# Patient Record
Sex: FEMALE | Race: WHITE | NOT HISPANIC OR LATINO | URBAN - METROPOLITAN AREA
[De-identification: names, ages, dates, MRNs, and addresses within clinical notes are randomized per-mention and may not be internally consistent; named-entity substitution may affect disease eponyms.]

---

## 2017-02-02 ENCOUNTER — APPOINTMENT (RX ONLY)
Dept: URBAN - METROPOLITAN AREA TELEMEDICINE 2 | Facility: TELEMEDICINE | Age: 62
Setting detail: DERMATOLOGY
End: 2017-02-02

## 2017-02-02 DIAGNOSIS — L82.1 OTHER SEBORRHEIC KERATOSIS: ICD-10-CM

## 2017-02-02 DIAGNOSIS — Z41.9 ENCOUNTER FOR PROCEDURE FOR PURPOSES OTHER THAN REMEDYING HEALTH STATE, UNSPECIFIED: ICD-10-CM

## 2017-02-02 DIAGNOSIS — L92.3 FOREIGN BODY GRANULOMA OF THE SKIN AND SUBCUTANEOUS TISSUE: ICD-10-CM

## 2017-02-02 DIAGNOSIS — Z71.89 OTHER SPECIFIED COUNSELING: ICD-10-CM

## 2017-02-02 DIAGNOSIS — L82.0 INFLAMED SEBORRHEIC KERATOSIS: ICD-10-CM

## 2017-02-02 PROBLEM — D48.5 NEOPLASM OF UNCERTAIN BEHAVIOR OF SKIN: Status: ACTIVE | Noted: 2017-02-02

## 2017-02-02 PROBLEM — K75.9 INFLAMMATORY LIVER DISEASE, UNSPECIFIED: Status: ACTIVE | Noted: 2017-02-02

## 2017-02-02 PROBLEM — L55.1 SUNBURN OF SECOND DEGREE: Status: ACTIVE | Noted: 2017-02-02

## 2017-02-02 PROBLEM — L23.7 ALLERGIC CONTACT DERMATITIS DUE TO PLANTS, EXCEPT FOOD: Status: ACTIVE | Noted: 2017-02-02

## 2017-02-02 PROCEDURE — ? TREATMENT REGIMEN

## 2017-02-02 PROCEDURE — 99202 OFFICE O/P NEW SF 15 MIN: CPT | Mod: 25

## 2017-02-02 PROCEDURE — ? COSMETIC CONSULTATION: FILLERS

## 2017-02-02 PROCEDURE — ? BIOPSY BY SHAVE METHOD

## 2017-02-02 PROCEDURE — ? FOREIGN BODY REMOVAL

## 2017-02-02 PROCEDURE — ? COUNSELING

## 2017-02-02 PROCEDURE — ? MEDICAL CONSULTATION: DYNAMIC RHYTIDES

## 2017-02-02 PROCEDURE — ? PATIENT SPECIFIC COUNSELING

## 2017-02-02 PROCEDURE — 11100: CPT

## 2017-02-02 PROCEDURE — 10120 INC&RMVL FB SUBQ TISS SMPL: CPT

## 2017-02-02 ASSESSMENT — LOCATION SIMPLE DESCRIPTION DERM
LOCATION SIMPLE: LEFT LIP
LOCATION SIMPLE: LEFT UPPER BACK
LOCATION SIMPLE: RIGHT CHEEK
LOCATION SIMPLE: LEFT HAND
LOCATION SIMPLE: RIGHT UPPER BACK

## 2017-02-02 ASSESSMENT — LOCATION DETAILED DESCRIPTION DERM
LOCATION DETAILED: LEFT LOWER CUTANEOUS LIP
LOCATION DETAILED: LEFT HYPOTHENAR EMINENCE
LOCATION DETAILED: RIGHT MID-UPPER BACK
LOCATION DETAILED: RIGHT CENTRAL BUCCAL CHEEK
LOCATION DETAILED: LEFT ULNAR PALM
LOCATION DETAILED: LEFT THENAR EMINENCE
LOCATION DETAILED: RIGHT SUPERIOR UPPER BACK
LOCATION DETAILED: LEFT INFERIOR UPPER BACK

## 2017-02-02 ASSESSMENT — LOCATION ZONE DERM
LOCATION ZONE: HAND
LOCATION ZONE: FACE
LOCATION ZONE: TRUNK
LOCATION ZONE: LIP

## 2017-02-02 NOTE — PROCEDURE: BIOPSY BY SHAVE METHOD
Cryotherapy Text: The wound bed was treated with cryotherapy after the biopsy was performed.
Hemostasis: Drysol
Size Of Lesion In Cm: 0.8
Curettage Text: The wound bed was treated with curettage after the biopsy was performed.
Electrodesiccation And Curettage Text: The wound bed was treated with electrodesiccation and curettage after the biopsy was performed.
Body Location Override (Optional - Billing Will Still Be Based On Selected Body Map Location If Applicable): Left lateral back
Wound Care: Vaseline
Anesthesia Volume In Cc: 0.5
Lab Facility: [id number]
X Size Of Lesion In Cm: 0
Bill For Surgical Tray: no
Electrodesiccation Text: The wound bed was treated with electrodesiccation after the biopsy was performed.
Post-Care Instructions: I reviewed with the patient in detail post-care instructions. Patient is to keep the biopsy site dry overnight, and then apply bacitracin twice daily until healed. Patient may apply hydrogen peroxide soaks to remove any crusting.
Dressing: bandage
Consent: Written consent was obtained and risks were reviewed including but not limited to scarring, infection, bleeding, scabbing, incomplete removal, nerve damage and allergy to anesthesia.
Render Post-Care Instructions In Note?: yes
Detail Level: Detailed
Silver Nitrate Text: The wound bed was treated with silver nitrate after the biopsy was performed.
Anesthesia Type: 1% lidocaine with epinephrine
Biopsy Method: Dermablade
Notification Instructions: Patient will be notified of biopsy results. However, patient instructed to call the office if not contacted within 2 weeks.
Billing Type: Third-Party Bill
Biopsy Type: H and E
Lab: [id number]
Type Of Destruction Used: Curettage

## 2017-02-02 NOTE — PROCEDURE: MIPS QUALITY
Quality 431: Preventive Care And Screening: Unhealthy Alcohol Use - Screening: Patient screened for unhealthy alcohol use using a single question and scores less than 2 times per year
Detail Level: Detailed
Quality 110: Preventive Care And Screening: Influenza Immunization: Influenza Immunization not Administered for Documented Reasons.

## 2017-02-02 NOTE — PROCEDURE: FOREIGN BODY REMOVAL
Detail Level: Detailed
Bill For Simple Or Complicated Fb Removal?: simple
Anesthesia Volume In Cc: 0.5

## 2017-07-24 ENCOUNTER — HOSPITAL ENCOUNTER (OUTPATIENT)
Dept: MRI IMAGING | Facility: CLINIC | Age: 62
Discharge: HOME | End: 2017-07-24
Attending: PHYSICIAN ASSISTANT | Admitting: PHYSICIAN ASSISTANT
Payer: COMMERCIAL

## 2017-07-24 DIAGNOSIS — M25.519 SHOULDER PAIN: ICD-10-CM

## 2017-07-24 PROCEDURE — 73221 MRI JOINT UPR EXTREM W/O DYE: CPT | Mod: RT

## 2018-07-20 ENCOUNTER — OFFICE VISIT (OUTPATIENT)
Dept: FAMILY MEDICINE | Facility: CLINIC | Age: 63
End: 2018-07-20
Payer: COMMERCIAL

## 2018-07-20 ENCOUNTER — ANESTHESIA EVENT (OUTPATIENT)
Dept: SURGERY | Facility: CLINIC | Age: 63
DRG: 470 | End: 2018-07-20
Payer: COMMERCIAL

## 2018-07-20 VITALS
DIASTOLIC BLOOD PRESSURE: 82 MMHG | TEMPERATURE: 97.7 F | OXYGEN SATURATION: 96 % | WEIGHT: 132 LBS | SYSTOLIC BLOOD PRESSURE: 132 MMHG | RESPIRATION RATE: 12 BRPM | BODY MASS INDEX: 24.92 KG/M2 | HEART RATE: 55 BPM | HEIGHT: 61 IN

## 2018-07-20 DIAGNOSIS — M17.11 PRIMARY OSTEOARTHRITIS OF RIGHT KNEE: ICD-10-CM

## 2018-07-20 DIAGNOSIS — Z01.818 PREOP GENERAL PHYSICAL EXAM: Primary | ICD-10-CM

## 2018-07-20 DIAGNOSIS — R35.0 URINARY FREQUENCY: ICD-10-CM

## 2018-07-20 LAB
ALBUMIN UR-MCNC: NEGATIVE MG/DL
APPEARANCE UR: CLEAR
BILIRUB UR QL STRIP: NEGATIVE
COLOR UR AUTO: YELLOW
ERYTHROCYTE [DISTWIDTH] IN BLOOD BY AUTOMATED COUNT: 13.8 % (ref 10–15)
GLUCOSE UR STRIP-MCNC: NEGATIVE MG/DL
HCT VFR BLD AUTO: 40.7 % (ref 35–47)
HGB BLD-MCNC: 13.5 G/DL (ref 11.7–15.7)
HGB UR QL STRIP: NEGATIVE
KETONES UR STRIP-MCNC: NEGATIVE MG/DL
LEUKOCYTE ESTERASE UR QL STRIP: NEGATIVE
MCH RBC QN AUTO: 30.7 PG (ref 26.5–33)
MCHC RBC AUTO-ENTMCNC: 33.2 G/DL (ref 31.5–36.5)
MCV RBC AUTO: 93 FL (ref 78–100)
NITRATE UR QL: NEGATIVE
PH UR STRIP: 7 PH (ref 5–7)
PLATELET # BLD AUTO: 323 10E9/L (ref 150–450)
RBC # BLD AUTO: 4.4 10E12/L (ref 3.8–5.2)
RBC #/AREA URNS AUTO: NORMAL /HPF
SOURCE: NORMAL
SP GR UR STRIP: <=1.005 (ref 1–1.03)
UROBILINOGEN UR STRIP-ACNC: 0.2 EU/DL (ref 0.2–1)
WBC # BLD AUTO: 9.1 10E9/L (ref 4–11)
WBC #/AREA URNS AUTO: NORMAL /HPF

## 2018-07-20 PROCEDURE — 85027 COMPLETE CBC AUTOMATED: CPT | Performed by: NURSE PRACTITIONER

## 2018-07-20 PROCEDURE — 36415 COLL VENOUS BLD VENIPUNCTURE: CPT | Performed by: NURSE PRACTITIONER

## 2018-07-20 PROCEDURE — 99214 OFFICE O/P EST MOD 30 MIN: CPT | Performed by: NURSE PRACTITIONER

## 2018-07-20 PROCEDURE — 81001 URINALYSIS AUTO W/SCOPE: CPT | Performed by: NURSE PRACTITIONER

## 2018-07-20 RX ORDER — NICOTINE POLACRILEX 2 MG
1 GUM BUCCAL DAILY
COMMUNITY
End: 2024-08-05

## 2018-07-20 NOTE — MR AVS SNAPSHOT
After Visit Summary   7/20/2018    Preeti Sherman    MRN: [id number]           Patient Information     Date Of Birth          1955        Visit Information        Provider Department      7/20/2018 12:40 PM Chio Olivares NP [facility]        Today's Diagnoses     Preop general physical exam    -  1    Primary osteoarthritis of right knee        Urinary frequency          Care Instructions      Before Your Surgery      Call your surgeon if there is any change in your health. This includes signs of a cold or flu (such as a sore throat, runny nose, cough, rash or fever).    Do not smoke, drink alcohol or take over the counter medicine (unless your surgeon or primary care doctor tells you to) for the 24 hours before and after surgery.    If you take prescribed drugs: Follow your doctor s orders about which medicines to take and which to stop until after surgery.    Eating and drinking prior to surgery: follow the instructions from your surgeon    Take a shower or bath the night before surgery. Use the soap your surgeon gave you to gently clean your skin. If you do not have soap from your surgeon, use your regular soap. Do not shave or scrub the surgery site.  Wear clean pajamas and have clean sheets on your bed.           Follow-ups after your visit        Your next 10 appointments already scheduled     Jul 23, 2018   Procedure with Ugo Brady MD   [facility] (--)    [address] [address] [phone]           The medical center is located at [address] (between [address] in [place], [place] north of [place]).              Who to contact     If you have questions or need follow up information about today's clinic visit or your schedule please contact [facility] directly at [phone].  Normal or non-critical lab and imaging results will be communicated to you by MyChart, letter or  "phone within 4 business days after the clinic has received the results. If you do not hear from us within 7 days, please contact the clinic through [employer] or phone. If you have a critical or abnormal lab result, we will notify you by phone as soon as possible.  Submit refill requests through [employer] or call your pharmacy and they will forward the refill request to us. Please allow 3 business days for your refill to be completed.          Additional Information About Your Visit        [employer] Information     [employer] gives you secure access to your electronic health record. If you see a primary care provider, you can also send messages to your care team and make appointments. If you have questions, please call your primary care clinic.  If you do not have a primary care provider, please call [phone] and they will assist you.        Care EveryWhere ID     This is your Care EveryWhere ID. This could be used by other organizations to access your [place] medical records  [id number]        Your Vitals Were     Pulse Temperature Respirations Height Pulse Oximetry BMI (Body Mass Index)    55 97.7  F (36.5  C) (Tympanic) 12 5' 0.5\" (1.537 m) 96% 25.36 kg/m2       Blood Pressure from Last 3 Encounters:   07/20/18 132/82   07/17/15 120/81   07/15/15 100/65    Weight from Last 3 Encounters:   07/20/18 132 lb (59.9 kg)   07/15/15 128 lb (58.1 kg)   07/07/15 128 lb 3.2 oz (58.2 kg)              We Performed the Following     CBC with platelets     UA with Microscopic reflex to Culture        Primary Care Provider Office Phone # Fax #    Astrid Marin PA-C [phone] [address]  [facility] [address]        Equal Access to Services     [facility]: Hadii ravi Carlson, waaxda luqadaha, qaybta kaalmada adeegyada, noel hung. So [facility] [phone].    ATENCIÓN: Si habla español, tiene a wan disposición servicios gratuitos de asistencia lingüística. Llame " al [phone].    We comply with applicable federal civil rights laws and [place] laws. We do not discriminate on the basis of race, color, national origin, age, disability, sex, sexual orientation, or gender identity.            Thank you!     Thank you for choosing [facility]  for your care. Our goal is always to provide you with excellent care. Hearing back from our patients is one way we can continue to improve our services. Please take a few minutes to complete the written survey that you may receive in the mail after your visit with us. Thank you!             Your Updated Medication List - Protect others around you: Learn how to safely use, store and throw away your medicines at www.disposemymeds.org.          This list is accurate as of 7/20/18  2:26 PM.  Always use your most recent med list.                   Brand Name Dispense Instructions for use Diagnosis    Biotin 1 MG Caps           Calcium 250 MG Caps           citalopram 20 MG tablet    celeXA    30 tablet    Take 1/2 tablet (10 mg) for 1-2 weeks, then increase to 1 tablet orally daily    Major depressive disorder, recurrent episode, in partial remission (H)       TURMERIC PO

## 2018-07-20 NOTE — PROGRESS NOTES
[facility]  [address] [address]  [phone]  Dept: [phone]    PRE-OP EVALUATION:  Today's date: 2018    Preeti Sherman (: 1955) presents for pre-operative evaluation assessment as requested by Dr. Brady.  She requires evaluation and anesthesia risk assessment prior to undergoing surgery/procedure for treatment of right knee pain .    Proposed Surgery/ Procedure: Right Total Knee Arthroplasty  Date of Surgery/ Procedure: 18  Time of Surgery/ Procedure: 12:00pm  Hospital/Surgical Facility: [facility]    Primary Physician: Astrid Marin  Type of Anesthesia Anticipated: Spinal    Patient has a Health Care Directive or Living Will:  NO    1. NO - Do you have a history of heart attack, stroke, stent, bypass or surgery on an artery in the head, neck, heart or legs?  2. NO - Do you ever have any pain or discomfort in your chest?  3. NO - Do you have a history of  Heart Failure?  4. NO - Are you troubled by shortness of breath when: walking on the level, up a slight hill or at night?  5. NO - Do you currently have a cold, bronchitis or other respiratory infection?  6. NO - Do you have a cough, shortness of breath or wheezing?  7. NO - Do you sometimes get pains in the calves of your legs when you walk?  8. NO - Do you or anyone in your family have previous history of blood clots?  9. NO - Do you or does anyone in your family have a serious bleeding problem such as prolonged bleeding following surgeries or cuts?  10. YES, over 40 years ago. - Have you ever had problems with anemia or been told to take iron pills?  11. NO - Have you had any abnormal blood loss such as black, tarry or bloody stools, or abnormal vaginal bleeding?  12. NO - Have you ever had a blood transfusion?  13. YES, She vomited after rotator cuff surgery. - Have you or any of your relatives ever had problems with anesthesia?  14. NO - Do you have sleep apnea, excessive  snoring or daytime drowsiness?  15. NO - Do you have any prosthetic heart valves?  16. NO - Do you have prosthetic joints?  17. NO - Is there any chance that you may be pregnant?      HPI:     HPI related to upcoming procedure: Tore ACL 6 years ago and symptoms worsened over the last 6 years.  Now right knee is bone on bone.  Patient admits to crepitus in the knee.  Occasionally going up and down steps cause pain.  She did have some swelling in it this winter but that has resolved.      See problem list for active medical problems.  Problems all longstanding and stable, except as noted/documented.  See ROS for pertinent symptoms related to these conditions.                                                                                                                                                          .    MEDICAL HISTORY:     Patient Active Problem List    Diagnosis Date Noted     History of hepatitis B 07/20/2015     Priority: Medium     Hyperlipidemia LDL goal <130 03/19/2012     Priority: Medium     Major depression in partial remission (H) 06/30/2011     Priority: Medium      quadraplegic from accident now for over 10 years. Does well with lexapro.       CARDIOVASCULAR SCREENING; LDL GOAL LESS THAN 160 06/30/2011     Priority: Medium     Disorder of bursae and tendons in shoulder region 07/03/2007     Priority: Medium     Problem list name updated by automated process. 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     HYSTERECTOMY  1988     shoulder  2007    Left rotator cuff repair.     URETHROPLASTY       Current Outpatient Prescriptions   Medication Sig Dispense Refill     Biotin 1 MG CAPS        Calcium 250 MG CAPS        citalopram (CELEXA) 20 MG tablet  "Take 1/2 tablet (10 mg) for 1-2 weeks, then increase to 1 tablet orally daily 30 tablet 1     TURMERIC PO        OTC products: None, except as noted above    Allergies   Allergen Reactions     Penicillins Itching     Patient notes she can take keflex  Astrid Pankratz, SHANE      Sulfa Drugs Itching      Latex Allergy: NO    Social History   Substance Use Topics     Smoking status: Never Smoker     Smokeless tobacco: Never Used     Alcohol use Yes      Comment: Socially; 0-1 drinks per week     History   Drug Use No       REVIEW OF SYSTEMS:   CONSTITUTIONAL: NEGATIVE for fever, chills, change in weight  INTEGUMENTARY/SKIN: NEGATIVE for worrisome rashes, moles or lesions  EYES: NEGATIVE for vision changes or irritation  ENT/MOUTH: NEGATIVE for ear, mouth and throat problems  RESP: NEGATIVE for significant cough or SOB  BREAST: NEGATIVE for masses, tenderness or discharge  CV: NEGATIVE for chest pain, palpitations or peripheral edema  GI: NEGATIVE for nausea, abdominal pain, heartburn, or change in bowel habits   female: frequency that just started, concerned for UTI, Hx of UTIs  MUSCULOSKELETAL:right knee crepitus and occasional pain, left shoulder arthritis  NEURO: NEGATIVE for weakness, dizziness or paresthesias  ENDOCRINE: NEGATIVE for temperature intolerance, skin/hair changes  HEME: NEGATIVE for bleeding problems  PSYCHIATRIC: NEGATIVE for changes in mood or affect    EXAM:   /82  Pulse 55  Temp 97.7  F (36.5  C) (Tympanic)  Resp 12  Ht 5' 0.5\" (1.537 m)  Wt 132 lb (59.9 kg)  SpO2 96%  BMI 25.36 kg/m2    GENERAL APPEARANCE: healthy, alert and no distress     EYES: EOMI, PERRL     HENT: ear canals and TM's normal and nose and mouth without ulcers or lesions     NECK: no adenopathy, no asymmetry, masses, or scars and thyroid normal to palpation     RESP: lungs clear to auscultation - no rales, rhonchi or wheezes     CV: regular rates and rhythm, normal S1 S2, no S3 or S4 and no murmur, click or rub   "   ABDOMEN:  soft, nontender, no HSM or masses and bowel sounds normal     MS: normal muscle tone and right knee edema with some tenderness medially     SKIN: no suspicious lesions or rashes     NEURO: Normal strength and tone, sensory exam grossly normal, mentation intact and speech normal     PSYCH: mentation appears normal. and affect normal/bright     LYMPHATICS: No cervical adenopathy    DIAGNOSTICS:   EKG: Not indicated due to non-vascular surgery and low risk of event (age <65 and without cardiac risk factors)    Urinalysis completed due to increased frequency recently in urination with history of UTI and this was negative.     CBC completed and negative.      Color Urine (no units)   Date Value   07/20/2018 Yellow     Appearance Urine (no units)   Date Value   07/20/2018 Clear     Glucose Urine (mg/dL)   Date Value   07/20/2018 Negative     Bilirubin Urine (no units)   Date Value   07/20/2018 Negative     Ketones Urine (mg/dL)   Date Value   07/20/2018 Negative     Specific Gravity Urine (no units)   Date Value   07/20/2018 <=1.005     pH Urine (pH)   Date Value   07/20/2018 7.0     Protein Albumin Urine (mg/dL)   Date Value   07/20/2018 Negative     Urobilinogen Urine (EU/dL)   Date Value   07/20/2018 0.2     Nitrite Urine (no units)   Date Value   07/20/2018 Negative     Leukocyte Esterase Urine (no units)   Date Value   07/20/2018 Negative           Recent Labs   Lab Test  07/15/15   1532  03/07/12   1136   HGB  14.2  14.1   PLT  311  311   NA  138  139   POTASSIUM  3.9  4.1   CR  0.98  0.74     Lab Results   Component Value Date    WBC 9.1 07/20/2018     Lab Results   Component Value Date    RBC 4.40 07/20/2018     Lab Results   Component Value Date    HGB 13.5 07/20/2018     Lab Results   Component Value Date    HCT 40.7 07/20/2018     No components found for: MCT  Lab Results   Component Value Date    MCV 93 07/20/2018     Lab Results   Component Value Date    MCH 30.7 07/20/2018     Lab Results    Component Value Date    MCHC 33.2 07/20/2018     Lab Results   Component Value Date    RDW 13.8 07/20/2018     Lab Results   Component Value Date     07/20/2018          IMPRESSION:   Reason for surgery/procedure: Right knee is bone on bone  Diagnosis/reason for consult: Pre-operative physical exam.    The proposed surgical procedure is considered INTERMEDIATE risk.    REVISED CARDIAC RISK INDEX  The patient has the following serious cardiovascular risks for perioperative complications such as (MI, PE, VFib and 3  AV Block):  No serious cardiac risks  INTERPRETATION: 0 risks: Class I (very low risk - 0.4% complication rate)    The patient has the following additional risks for perioperative complications:  No identified additional risks      ICD-10-CM    1. Preop general physical exam Z01.818        RECOMMENDATIONS:       --Patient is to take all scheduled medications on the day of surgery EXCEPT for modifications listed below.    APPROVAL GIVEN to proceed with proposed procedure, without further diagnostic evaluation       Signed Electronically by: Chio Olivares NP    Copy of this evaluation report is provided to requesting physician.    [facility] Preop Guidelines    Revised Cardiac Risk Index

## 2018-07-23 ENCOUNTER — ANESTHESIA (OUTPATIENT)
Dept: SURGERY | Facility: CLINIC | Age: 63
DRG: 470 | End: 2018-07-23
Payer: COMMERCIAL

## 2018-07-23 ENCOUNTER — HOSPITAL ENCOUNTER (INPATIENT)
Facility: CLINIC | Age: 63
LOS: 3 days | Discharge: NURSING, REHAB OR LONG-TERM CARE | DRG: 470 | End: 2018-07-26
Attending: ORTHOPAEDIC SURGERY | Admitting: ORTHOPAEDIC SURGERY
Payer: COMMERCIAL

## 2018-07-23 DIAGNOSIS — Z96.651 S/P TOTAL KNEE ARTHROPLASTY, RIGHT: Primary | ICD-10-CM

## 2018-07-23 PROBLEM — M17.11 RIGHT KNEE DJD: Status: ACTIVE | Noted: 2018-07-23

## 2018-07-23 PROCEDURE — 40000306 ZZH STATISTIC PRE PROC ASSESS II: Performed by: ORTHOPAEDIC SURGERY

## 2018-07-23 PROCEDURE — 36000063 ZZH SURGERY LEVEL 4 EA 15 ADDTL MIN: Performed by: ORTHOPAEDIC SURGERY

## 2018-07-23 PROCEDURE — 37000008 ZZH ANESTHESIA TECHNICAL FEE, 1ST 30 MIN: Performed by: ORTHOPAEDIC SURGERY

## 2018-07-23 PROCEDURE — 27110028 ZZH OR GENERAL SUPPLY NON-STERILE: Performed by: ORTHOPAEDIC SURGERY

## 2018-07-23 PROCEDURE — 27210794 ZZH OR GENERAL SUPPLY STERILE: Performed by: ORTHOPAEDIC SURGERY

## 2018-07-23 PROCEDURE — 0SRC0J9 REPLACEMENT OF RIGHT KNEE JOINT WITH SYNTHETIC SUBSTITUTE, CEMENTED, OPEN APPROACH: ICD-10-PCS | Performed by: ORTHOPAEDIC SURGERY

## 2018-07-23 PROCEDURE — 25800025 ZZH RX 258: Performed by: ORTHOPAEDIC SURGERY

## 2018-07-23 PROCEDURE — 37000009 ZZH ANESTHESIA TECHNICAL FEE, EACH ADDTL 15 MIN: Performed by: ORTHOPAEDIC SURGERY

## 2018-07-23 PROCEDURE — C1776 JOINT DEVICE (IMPLANTABLE): HCPCS | Performed by: ORTHOPAEDIC SURGERY

## 2018-07-23 PROCEDURE — 25000128 H RX IP 250 OP 636: Performed by: PHYSICIAN ASSISTANT

## 2018-07-23 PROCEDURE — 27810169 ZZH OR IMPLANT GENERAL: Performed by: ORTHOPAEDIC SURGERY

## 2018-07-23 PROCEDURE — 25000128 H RX IP 250 OP 636: Performed by: NURSE ANESTHETIST, CERTIFIED REGISTERED

## 2018-07-23 PROCEDURE — 25000128 H RX IP 250 OP 636: Performed by: ORTHOPAEDIC SURGERY

## 2018-07-23 PROCEDURE — 25000132 ZZH RX MED GY IP 250 OP 250 PS 637: Performed by: ORTHOPAEDIC SURGERY

## 2018-07-23 PROCEDURE — 36000093 ZZH SURGERY LEVEL 4 1ST 30 MIN: Performed by: ORTHOPAEDIC SURGERY

## 2018-07-23 PROCEDURE — 25000125 ZZHC RX 250: Performed by: NURSE ANESTHETIST, CERTIFIED REGISTERED

## 2018-07-23 PROCEDURE — 37000011 ZZH ANESTHESIA WARD SERVICE: Performed by: NURSE ANESTHETIST, CERTIFIED REGISTERED

## 2018-07-23 PROCEDURE — 25000132 ZZH RX MED GY IP 250 OP 250 PS 637: Performed by: PHYSICIAN ASSISTANT

## 2018-07-23 PROCEDURE — 12000007 ZZH R&B INTERMEDIATE

## 2018-07-23 PROCEDURE — 25000125 ZZHC RX 250: Performed by: ORTHOPAEDIC SURGERY

## 2018-07-23 PROCEDURE — 27210995 ZZH RX 272: Performed by: ORTHOPAEDIC SURGERY

## 2018-07-23 PROCEDURE — 71000012 ZZH RECOVERY PHASE 1 LEVEL 1 FIRST HR: Performed by: ORTHOPAEDIC SURGERY

## 2018-07-23 DEVICE — IMPLANTABLE DEVICE: Type: IMPLANTABLE DEVICE | Site: KNEE | Status: FUNCTIONAL

## 2018-07-23 DEVICE — IMP TIB BASE JJ ATTUNE RP SZ3 CEM 150610003: Type: IMPLANTABLE DEVICE | Site: KNEE | Status: FUNCTIONAL

## 2018-07-23 DEVICE — BONE CEMENT DEPUY FAST SET 20GM CMW2: Type: IMPLANTABLE DEVICE | Site: KNEE | Status: FUNCTIONAL

## 2018-07-23 RX ORDER — ONDANSETRON 2 MG/ML
4 INJECTION INTRAMUSCULAR; INTRAVENOUS EVERY 30 MIN PRN
Status: DISCONTINUED | OUTPATIENT
Start: 2018-07-23 | End: 2018-07-23 | Stop reason: HOSPADM

## 2018-07-23 RX ORDER — NALOXONE HYDROCHLORIDE 0.4 MG/ML
.1-.4 INJECTION, SOLUTION INTRAMUSCULAR; INTRAVENOUS; SUBCUTANEOUS
Status: DISCONTINUED | OUTPATIENT
Start: 2018-07-23 | End: 2018-07-26 | Stop reason: HOSPADM

## 2018-07-23 RX ORDER — BUPIVACAINE HYDROCHLORIDE 7.5 MG/ML
INJECTION, SOLUTION INTRASPINAL PRN
Status: DISCONTINUED | OUTPATIENT
Start: 2018-07-23 | End: 2018-07-23

## 2018-07-23 RX ORDER — ACETAMINOPHEN 325 MG/1
975 TABLET ORAL EVERY 8 HOURS
Status: COMPLETED | OUTPATIENT
Start: 2018-07-23 | End: 2018-07-26

## 2018-07-23 RX ORDER — METOCLOPRAMIDE HYDROCHLORIDE 5 MG/ML
10 INJECTION INTRAMUSCULAR; INTRAVENOUS EVERY 6 HOURS PRN
Status: DISCONTINUED | OUTPATIENT
Start: 2018-07-23 | End: 2018-07-23 | Stop reason: HOSPADM

## 2018-07-23 RX ORDER — ONDANSETRON 4 MG/1
4 TABLET, ORALLY DISINTEGRATING ORAL EVERY 30 MIN PRN
Status: DISCONTINUED | OUTPATIENT
Start: 2018-07-23 | End: 2018-07-23 | Stop reason: HOSPADM

## 2018-07-23 RX ORDER — PROPOFOL 10 MG/ML
INJECTION, EMULSION INTRAVENOUS PRN
Status: DISCONTINUED | OUTPATIENT
Start: 2018-07-23 | End: 2018-07-23

## 2018-07-23 RX ORDER — CEFAZOLIN SODIUM 2 G/100ML
2 INJECTION, SOLUTION INTRAVENOUS
Status: COMPLETED | OUTPATIENT
Start: 2018-07-23 | End: 2018-07-23

## 2018-07-23 RX ORDER — CEFAZOLIN SODIUM 1 G/50ML
1 INJECTION, SOLUTION INTRAVENOUS EVERY 8 HOURS
Status: COMPLETED | OUTPATIENT
Start: 2018-07-23 | End: 2018-07-24

## 2018-07-23 RX ORDER — SCOLOPAMINE TRANSDERMAL SYSTEM 1 MG/1
1 PATCH, EXTENDED RELEASE TRANSDERMAL ONCE
Status: COMPLETED | OUTPATIENT
Start: 2018-07-23 | End: 2018-07-23

## 2018-07-23 RX ORDER — EPINEPHRINE 1 MG/ML
INJECTION, SOLUTION, CONCENTRATE INTRAVENOUS PRN
Status: DISCONTINUED | OUTPATIENT
Start: 2018-07-23 | End: 2018-07-23

## 2018-07-23 RX ORDER — DEXTROSE MONOHYDRATE, SODIUM CHLORIDE, AND POTASSIUM CHLORIDE 50; 1.49; 4.5 G/1000ML; G/1000ML; G/1000ML
INJECTION, SOLUTION INTRAVENOUS CONTINUOUS
Status: DISCONTINUED | OUTPATIENT
Start: 2018-07-23 | End: 2018-07-24 | Stop reason: CLARIF

## 2018-07-23 RX ORDER — ROPIVACAINE HYDROCHLORIDE 7.5 MG/ML
INJECTION, SOLUTION EPIDURAL; PERINEURAL PRN
Status: DISCONTINUED | OUTPATIENT
Start: 2018-07-23 | End: 2018-07-23

## 2018-07-23 RX ORDER — SODIUM CHLORIDE, SODIUM LACTATE, POTASSIUM CHLORIDE, CALCIUM CHLORIDE 600; 310; 30; 20 MG/100ML; MG/100ML; MG/100ML; MG/100ML
INJECTION, SOLUTION INTRAVENOUS CONTINUOUS
Status: DISCONTINUED | OUTPATIENT
Start: 2018-07-23 | End: 2018-07-23 | Stop reason: HOSPADM

## 2018-07-23 RX ORDER — ONDANSETRON 2 MG/ML
INJECTION INTRAMUSCULAR; INTRAVENOUS PRN
Status: DISCONTINUED | OUTPATIENT
Start: 2018-07-23 | End: 2018-07-23

## 2018-07-23 RX ORDER — ZOLPIDEM TARTRATE 5 MG/1
5 TABLET ORAL
Status: DISCONTINUED | OUTPATIENT
Start: 2018-07-24 | End: 2018-07-26 | Stop reason: HOSPADM

## 2018-07-23 RX ORDER — ONDANSETRON 2 MG/ML
4 INJECTION INTRAMUSCULAR; INTRAVENOUS EVERY 6 HOURS PRN
Status: DISCONTINUED | OUTPATIENT
Start: 2018-07-23 | End: 2018-07-26 | Stop reason: HOSPADM

## 2018-07-23 RX ORDER — GABAPENTIN 300 MG/1
300 CAPSULE ORAL 2 TIMES DAILY
Status: COMPLETED | OUTPATIENT
Start: 2018-07-23 | End: 2018-07-26

## 2018-07-23 RX ORDER — GABAPENTIN 300 MG/1
300 CAPSULE ORAL
Status: COMPLETED | OUTPATIENT
Start: 2018-07-23 | End: 2018-07-23

## 2018-07-23 RX ORDER — FENTANYL CITRATE 50 UG/ML
25-50 INJECTION, SOLUTION INTRAMUSCULAR; INTRAVENOUS
Status: DISCONTINUED | OUTPATIENT
Start: 2018-07-23 | End: 2018-07-23 | Stop reason: HOSPADM

## 2018-07-23 RX ORDER — ONDANSETRON 4 MG/1
4 TABLET, ORALLY DISINTEGRATING ORAL EVERY 6 HOURS PRN
Status: DISCONTINUED | OUTPATIENT
Start: 2018-07-23 | End: 2018-07-26 | Stop reason: HOSPADM

## 2018-07-23 RX ORDER — LIDOCAINE 40 MG/G
CREAM TOPICAL
Status: DISCONTINUED | OUTPATIENT
Start: 2018-07-23 | End: 2018-07-23 | Stop reason: HOSPADM

## 2018-07-23 RX ORDER — FENTANYL CITRATE 50 UG/ML
25-50 INJECTION, SOLUTION INTRAMUSCULAR; INTRAVENOUS
Status: CANCELLED | OUTPATIENT
Start: 2018-07-23

## 2018-07-23 RX ORDER — ACETAMINOPHEN 325 MG/1
650 TABLET ORAL EVERY 4 HOURS PRN
Status: DISCONTINUED | OUTPATIENT
Start: 2018-07-26 | End: 2018-07-26 | Stop reason: HOSPADM

## 2018-07-23 RX ORDER — AMOXICILLIN 250 MG
1 CAPSULE ORAL 2 TIMES DAILY
Status: DISCONTINUED | OUTPATIENT
Start: 2018-07-23 | End: 2018-07-26 | Stop reason: HOSPADM

## 2018-07-23 RX ORDER — ALBUTEROL SULFATE 0.83 MG/ML
2.5 SOLUTION RESPIRATORY (INHALATION) EVERY 4 HOURS PRN
Status: DISCONTINUED | OUTPATIENT
Start: 2018-07-23 | End: 2018-07-23 | Stop reason: HOSPADM

## 2018-07-23 RX ORDER — FENTANYL CITRATE 50 UG/ML
INJECTION, SOLUTION INTRAMUSCULAR; INTRAVENOUS PRN
Status: DISCONTINUED | OUTPATIENT
Start: 2018-07-23 | End: 2018-07-23

## 2018-07-23 RX ORDER — METOCLOPRAMIDE 10 MG/1
10 TABLET ORAL EVERY 6 HOURS PRN
Status: DISCONTINUED | OUTPATIENT
Start: 2018-07-23 | End: 2018-07-23 | Stop reason: HOSPADM

## 2018-07-23 RX ORDER — KETOROLAC TROMETHAMINE 30 MG/ML
INJECTION, SOLUTION INTRAMUSCULAR; INTRAVENOUS PRN
Status: DISCONTINUED | OUTPATIENT
Start: 2018-07-23 | End: 2018-07-23

## 2018-07-23 RX ORDER — DEXAMETHASONE SODIUM PHOSPHATE 4 MG/ML
INJECTION, SOLUTION INTRA-ARTICULAR; INTRALESIONAL; INTRAMUSCULAR; INTRAVENOUS; SOFT TISSUE PRN
Status: DISCONTINUED | OUTPATIENT
Start: 2018-07-23 | End: 2018-07-23

## 2018-07-23 RX ORDER — NALOXONE HYDROCHLORIDE 0.4 MG/ML
.1-.4 INJECTION, SOLUTION INTRAMUSCULAR; INTRAVENOUS; SUBCUTANEOUS
Status: DISCONTINUED | OUTPATIENT
Start: 2018-07-23 | End: 2018-07-23 | Stop reason: HOSPADM

## 2018-07-23 RX ORDER — HYDROXYZINE HYDROCHLORIDE 25 MG/1
25 TABLET, FILM COATED ORAL EVERY 6 HOURS PRN
Status: DISCONTINUED | OUTPATIENT
Start: 2018-07-23 | End: 2018-07-26 | Stop reason: HOSPADM

## 2018-07-23 RX ORDER — MEPERIDINE HYDROCHLORIDE 50 MG/ML
12.5 INJECTION INTRAMUSCULAR; INTRAVENOUS; SUBCUTANEOUS
Status: DISCONTINUED | OUTPATIENT
Start: 2018-07-23 | End: 2018-07-23 | Stop reason: HOSPADM

## 2018-07-23 RX ORDER — OXYCODONE HYDROCHLORIDE 5 MG/1
5-10 TABLET ORAL
Status: DISCONTINUED | OUTPATIENT
Start: 2018-07-23 | End: 2018-07-26 | Stop reason: HOSPADM

## 2018-07-23 RX ORDER — ACETAMINOPHEN 650 MG/1
650 SUPPOSITORY RECTAL EVERY 4 HOURS PRN
Status: DISCONTINUED | OUTPATIENT
Start: 2018-07-23 | End: 2018-07-23 | Stop reason: HOSPADM

## 2018-07-23 RX ORDER — AMOXICILLIN 250 MG
2 CAPSULE ORAL 2 TIMES DAILY
Status: DISCONTINUED | OUTPATIENT
Start: 2018-07-23 | End: 2018-07-26 | Stop reason: HOSPADM

## 2018-07-23 RX ORDER — PROCHLORPERAZINE MALEATE 10 MG
10 TABLET ORAL EVERY 6 HOURS PRN
Status: DISCONTINUED | OUTPATIENT
Start: 2018-07-23 | End: 2018-07-26 | Stop reason: HOSPADM

## 2018-07-23 RX ORDER — CEFAZOLIN SODIUM 1 G/50ML
1 INJECTION, SOLUTION INTRAVENOUS SEE ADMIN INSTRUCTIONS
Status: DISCONTINUED | OUTPATIENT
Start: 2018-07-23 | End: 2018-07-23 | Stop reason: HOSPADM

## 2018-07-23 RX ORDER — LIDOCAINE HYDROCHLORIDE 10 MG/ML
INJECTION, SOLUTION INFILTRATION; PERINEURAL PRN
Status: DISCONTINUED | OUTPATIENT
Start: 2018-07-23 | End: 2018-07-23

## 2018-07-23 RX ORDER — MORPHINE SULFATE 2 MG/ML
2-4 INJECTION, SOLUTION INTRAMUSCULAR; INTRAVENOUS
Status: DISCONTINUED | OUTPATIENT
Start: 2018-07-23 | End: 2018-07-26 | Stop reason: HOSPADM

## 2018-07-23 RX ORDER — CITALOPRAM HYDROBROMIDE 20 MG/1
20 TABLET ORAL EVERY MORNING
Status: DISCONTINUED | OUTPATIENT
Start: 2018-07-24 | End: 2018-07-26 | Stop reason: HOSPADM

## 2018-07-23 RX ORDER — HYDROMORPHONE HYDROCHLORIDE 1 MG/ML
.3-.5 INJECTION, SOLUTION INTRAMUSCULAR; INTRAVENOUS; SUBCUTANEOUS EVERY 10 MIN PRN
Status: DISCONTINUED | OUTPATIENT
Start: 2018-07-23 | End: 2018-07-23 | Stop reason: HOSPADM

## 2018-07-23 RX ORDER — LIDOCAINE 40 MG/G
CREAM TOPICAL
Status: DISCONTINUED | OUTPATIENT
Start: 2018-07-23 | End: 2018-07-26 | Stop reason: HOSPADM

## 2018-07-23 RX ADMIN — ONDANSETRON 4 MG: 2 INJECTION INTRAMUSCULAR; INTRAVENOUS at 12:00

## 2018-07-23 RX ADMIN — BUPIVACAINE HYDROCHLORIDE IN DEXTROSE 1.8 ML: 7.5 INJECTION, SOLUTION SUBARACHNOID at 12:06

## 2018-07-23 RX ADMIN — PROPOFOL 50 MG: 10 INJECTION, EMULSION INTRAVENOUS at 12:15

## 2018-07-23 RX ADMIN — SODIUM CHLORIDE, POTASSIUM CHLORIDE, SODIUM LACTATE AND CALCIUM CHLORIDE: 600; 310; 30; 20 INJECTION, SOLUTION INTRAVENOUS at 12:40

## 2018-07-23 RX ADMIN — HYDROXYZINE HYDROCHLORIDE 25 MG: 25 TABLET ORAL at 21:16

## 2018-07-23 RX ADMIN — OXYCODONE HYDROCHLORIDE 5 MG: 5 TABLET ORAL at 18:39

## 2018-07-23 RX ADMIN — PROPOFOL 50 MG: 10 INJECTION, EMULSION INTRAVENOUS at 12:20

## 2018-07-23 RX ADMIN — PROPOFOL 25 MG: 10 INJECTION, EMULSION INTRAVENOUS at 13:10

## 2018-07-23 RX ADMIN — DEXAMETHASONE SODIUM PHOSPHATE 10 MG: 4 INJECTION, SOLUTION INTRA-ARTICULAR; INTRALESIONAL; INTRAMUSCULAR; INTRAVENOUS; SOFT TISSUE at 13:40

## 2018-07-23 RX ADMIN — PROPOFOL 50 MG: 10 INJECTION, EMULSION INTRAVENOUS at 12:30

## 2018-07-23 RX ADMIN — GABAPENTIN 300 MG: 300 CAPSULE ORAL at 10:44

## 2018-07-23 RX ADMIN — OXYCODONE HYDROCHLORIDE 5 MG: 5 TABLET ORAL at 17:16

## 2018-07-23 RX ADMIN — CEFAZOLIN SODIUM 1 G: 1 INJECTION, SOLUTION INTRAVENOUS at 21:03

## 2018-07-23 RX ADMIN — OXYCODONE HYDROCHLORIDE 10 MG: 5 TABLET ORAL at 22:46

## 2018-07-23 RX ADMIN — GABAPENTIN 300 MG: 300 CAPSULE ORAL at 21:03

## 2018-07-23 RX ADMIN — SENNOSIDES AND DOCUSATE SODIUM 1 TABLET: 8.6; 5 TABLET ORAL at 21:03

## 2018-07-23 RX ADMIN — PROPOFOL 50 MG: 10 INJECTION, EMULSION INTRAVENOUS at 12:55

## 2018-07-23 RX ADMIN — CEFAZOLIN SODIUM 2 G: 2 INJECTION, SOLUTION INTRAVENOUS at 12:10

## 2018-07-23 RX ADMIN — MORPHINE SULFATE 2 MG: 2 INJECTION, SOLUTION INTRAMUSCULAR; INTRAVENOUS at 20:49

## 2018-07-23 RX ADMIN — MIDAZOLAM 2 MG: 1 INJECTION INTRAMUSCULAR; INTRAVENOUS at 11:56

## 2018-07-23 RX ADMIN — MORPHINE SULFATE 2 MG: 2 INJECTION, SOLUTION INTRAMUSCULAR; INTRAVENOUS at 21:16

## 2018-07-23 RX ADMIN — POTASSIUM CHLORIDE, DEXTROSE MONOHYDRATE AND SODIUM CHLORIDE: 150; 5; 450 INJECTION, SOLUTION INTRAVENOUS at 14:55

## 2018-07-23 RX ADMIN — LIDOCAINE HYDROCHLORIDE 50 MG: 10 INJECTION, SOLUTION INFILTRATION; PERINEURAL at 12:06

## 2018-07-23 RX ADMIN — MIDAZOLAM 2 MG: 1 INJECTION INTRAMUSCULAR; INTRAVENOUS at 12:00

## 2018-07-23 RX ADMIN — LIDOCAINE HYDROCHLORIDE 1 ML: 10 INJECTION, SOLUTION EPIDURAL; INFILTRATION; INTRACAUDAL; PERINEURAL at 10:42

## 2018-07-23 RX ADMIN — PROPOFOL 50 MG: 10 INJECTION, EMULSION INTRAVENOUS at 12:40

## 2018-07-23 RX ADMIN — FENTANYL CITRATE 100 MCG: 50 INJECTION, SOLUTION INTRAMUSCULAR; INTRAVENOUS at 12:00

## 2018-07-23 RX ADMIN — KETOROLAC TROMETHAMINE 30 MG: 30 INJECTION, SOLUTION INTRAMUSCULAR at 12:48

## 2018-07-23 RX ADMIN — SCOPOLAMINE 1 PATCH: 1 PATCH, EXTENDED RELEASE TRANSDERMAL at 10:44

## 2018-07-23 RX ADMIN — EPINEPHRINE 0.2 MG: 1 INJECTION, SOLUTION INTRAMUSCULAR; SUBCUTANEOUS at 12:06

## 2018-07-23 RX ADMIN — FENTANYL CITRATE 100 MCG: 50 INJECTION, SOLUTION INTRAMUSCULAR; INTRAVENOUS at 11:56

## 2018-07-23 RX ADMIN — ACETAMINOPHEN 975 MG: 325 TABLET ORAL at 17:14

## 2018-07-23 RX ADMIN — SODIUM CHLORIDE, POTASSIUM CHLORIDE, SODIUM LACTATE AND CALCIUM CHLORIDE: 600; 310; 30; 20 INJECTION, SOLUTION INTRAVENOUS at 10:42

## 2018-07-23 RX ADMIN — ROPIVACAINE HYDROCHLORIDE 20 ML: 7.5 INJECTION, SOLUTION EPIDURAL; PERINEURAL at 13:40

## 2018-07-23 ASSESSMENT — ACTIVITIES OF DAILY LIVING (ADL)
ADLS_ACUITY_SCORE: 11
ADLS_ACUITY_SCORE: 11

## 2018-07-23 NOTE — IP AVS SNAPSHOT
"` `           [facility] SURGICAL: [phone]                                              INTERAGENCY TRANSFER FORM - NURSING   2018                    Hospital Admission Date: 2018  GINGER CHRISTIANSON   : 1955  Sex: Female        Attending Provider: Ugo Brady MD     Allergies:  Penicillins, Sulfa Drugs    Infection:  None   Service:  SURGERY    Ht:  1.537 m (5' 0.51\")   Wt:  59.9 kg (132 lb)   Admission Wt:  59.9 kg (132 lb)    BMI:  25.35 kg/m 2   BSA:  1.6 m 2            Patient PCP Information     Provider PCP Type    Astrid Marin PA-C General      Current Code Status     Date Active Code Status Order ID Comments User Context       2018  2:39 PM Full Code [id number]  Ugo Brady MD Inpatient       Code Status History     Date Active Date Inactive Code Status Order ID Comments User Context    This patient has a current code status but no historical code status.      Advance Directives        Scanned docmt in ACP Activity?           No scanned doc        Hospital Problems as of 2018              Priority Class Noted POA    Major depression in partial remission (H) Medium  2011 Yes    Hyperlipidemia LDL goal <130 Medium  3/19/2012 Yes    History of hepatitis B Medium  2015 Yes    Right knee DJD Medium  2018 Yes    * (Principal)S/P total knee arthroplasty, right Medium  2018 Yes      Non-Hospital Problems as of 2018              Priority Class Noted    Disorder of bursae and tendons in shoulder region Medium  7/3/2007    CARDIOVASCULAR SCREENING; LDL GOAL LESS THAN 160 Medium  2011      Immunizations     None         END      ASSESSMENT     Discharge Profile Flowsheet     EXPECTED DISCHARGE     Last Bowel Movement  18 0156    Expected Discharge Date  18 1528   Passing flatus  yes 18 1559    DISCHARGE NEEDS ASSESSMENT     COMMUNICATION ASSESSMENT      Concerns To Be Addressed  all " concerns addressed in this encounter;no discharge needs identified 07/26/18 1528   Patient's communication style  spoken language (English or Bilingual) 07/20/18 1215    Patient/family verbalizes understanding of discharge plan recommendations?  Yes 07/26/18 1528   FINAL RESOURCES      Medical Team notified of plan?  yes 07/26/18 1528   Resources List  Home Care;Skilled Nursing Facility 07/24/18 1145    Equipment Currently Used at Home  none (has shower chair, grab bars) 07/24/18 1234   [facility] [phone], Fax: [phone] 07/24/18 1143    Transportation Available  family or friend will provide 07/26/18 1528   PAS Number  [id number] 07/25/18 1524    Primary Care Clinic Name  [facility] 07/24/18 1145   SKIN      Primary Care MD Name  Astrid Marin MD 07/24/18 1145   Inspection of bony prominences  Full except (identify areas not inspected) 07/26/18 0811    Discharge Planning Comments  follow up in 2 weeks 07/26/18 1528   Inspection under devices  Full except (identify device(s) not inspected) 07/26/18 0811    FUNCTIONAL LEVEL CURRENT     Skin WDL  ex 07/26/18 0811    Ambulation  1 - assistive equipment 07/26/18 1528   Skin Temperature  warm 07/26/18 0811    Transferring  1 - assistive equipment 07/26/18 1528   Skin Moisture  dry 07/26/18 0811    Toileting  0 - independent 07/26/18 1528   Skin Elasticity  quick return to original state 07/24/18 2321    Bathing  1 - assistive equipment 07/26/18 1528   Skin Integrity  incision(s);scar(s);tattoo(s) 07/26/18 0811    Dressing  0 - independent 07/26/18 1528   Full except areas not inspected   Knee, right 07/26/18 0811    Eating  0 - independent 07/26/18 1528   Not Inspected under devices  Other (ace wrap right knee) 07/26/18 0811    Communication  0 - understands/communicates without difficulty 07/26/18 1528   SAFETY      Swallowing  0 - swallows foods/liquids without difficulty 07/26/18 1528   Safety WDL  WDL 07/26/18 1605  "   GASTROINTESTINAL (ADULT,PEDIATRIC,OB)     All Alarms  alarm(s) activated and audible 07/26/18 1605    GI WDL  WDL 07/26/18 1559   Safety Factors  bed in low position;wheels locked;call light in reach;upper side rails raised x 2;ID band on 07/26/18 1605                 Assessment WDL (Within Defined Limits) Definitions           Safety WDL     Effective: 09/28/15    Row Information: <b>WDL Definition:</b> Bed in low position, wheels locked; call light in reach; upper side rails up x 2; ID band on<br> <font color=\"gray\"><i>Item=AS safety wdl>>List=AS safety wdl>>Version=F14</i></font>      Skin WDL     Effective: 09/28/15    Row Information: <b>WDL Definition:</b> Warm; dry; intact; elastic; without discoloration; pressure points without redness<br> <font color=\"gray\"><i>Item=AS skin wdl>>List=AS skin wdl>>Version=F14</i></font>      Vitals     Vital Signs Flowsheet     VITAL SIGNS     Pain Control  partially effective 07/26/18 1446    Temp  98.8  F (37.1  C) 07/26/18 0805   Functioning  can do most things, but pain gets in the way of some 07/26/18 1446    Temp src  Oral 07/26/18 0805   Sleep  awake with occasional pain 07/26/18 0446    Resp  18 07/26/18 0805   ANALGESIA SIDE EFFECTS MONITORING      Pulse  71 07/26/18 0805   Side Effects Monitoring: Respiratory Quality  R 07/25/18 0422    Pulse/Heart Rate Source  Monitor 07/26/18 0805   Side Effects Monitoring: Respiratory Depth  N 07/25/18 0422    BP  132/67 07/26/18 0805   Side Effects Monitoring: Sedation Level  S 07/25/18 0422    BP Location  Right arm 07/26/18 0805   HEIGHT AND WEIGHT      OXYGEN THERAPY     Height  1.537 m (5' 0.51\") 07/23/18 1016    SpO2  98 % 07/26/18 0805   Weight  59.9 kg (132 lb) 07/23/18 1016    O2 Device  None (Room air) 07/26/18 0805   BSA (Calculated - sq m)  1.6 07/23/18 1016    PAIN/COMFORT     BMI (Calculated)  25.4 07/23/18 1016    Patient Currently in Pain  yes 07/26/18 0710   POSITIONING      Preferred Pain Scale  CAPA " (Clinically Aligned Pain Assessment) ([facility] Adults Only) 07/26/18 0446   Body Position  supine, head elevated 07/26/18 1605    0-10 Pain Scale  7 07/25/18 2031   Head of Bed (HOB)  HOB at 30-45 degrees 07/26/18 1101    Pain Location  Knee 07/26/18 0152   Positioning/Transfer Devices  pillows;in use 07/26/18 1101    Pain Orientation  Right 07/26/18 0152   Chair  Recline and up in chair 07/26/18 0805    Pain Descriptors  Aching;Constant 07/26/18 0152   ECG      Pain Intervention(s)  Medication (See eMAR);Cold applied;Repositioned 07/26/18 0152   ECG Rhythm  Normal sinus rhythm 07/23/18 1411    Response to Interventions  Decrease in pain 07/26/18 0259   DAILY CARE      CLINICALLY ALIGNED PAIN ASSESSMENT (CAPA) ([facility] ADULTS ONLY)     Activity Management  sitting, edge of bed 07/26/18 1157    Comfort  tolerable with discomfort 07/26/18 1446   Activity Assistance Provided  assistance, stand-by 07/26/18 1101    Change in Pain  about the same 07/26/18 1446   Assistive Device Utilized  walker 07/26/18 1101            Patient Lines/Drains/Airways Status    Active LINES/DRAINS/AIRWAYS     Name: Placement date: Placement time: Site: Days: Last dressing change:    Incision/Surgical Site 07/23/18 Right Knee 07/23/18   1230    3             Patient Lines/Drains/Airways Status    Active PICC/CVC     None            Intake/Output Detail Report     Date Intake     Output   Net    Shift P.O. I.V. IV Piggyback Total Urine Drains Total       Day 07/25/18 0700 - 07/25/18 1459 410 -- -- 410 -- -- -- 410    Nanda 07/25/18 1500 - 07/25/18 2259 240 -- -- 240 -- -- -- 240    Noc 07/25/18 2300 - 07/26/18 0659 100 3 -- 103 -- -- -- 103    Day 07/26/18 0700 - 07/26/18 1459 390 -- -- 390 -- -- -- 390    Nanda 07/26/18 1500 - 07/26/18 2259 -- -- -- -- -- -- -- 0      Last Void/BM       Most Recent Value    Urine Occurrence 1 at 07/26/2018 0806    Stool Occurrence       Case Management/Discharge Planning      Case Management/Discharge Planning Flowsheet     REFERRAL INFORMATION     Expected Discharge Date  07/26/18 07/26/18 1528    Did the Initial Social Work Assessment result in a Social Work Case?  No 07/24/18 1140   ASSESSMENT/CONCERNS TO BE ADDRESSED      Reason For Consult  discharge planning 07/24/18 1140   Concerns To Be Addressed  all concerns addressed in this encounter;no discharge needs identified 07/26/18 1528    Primary Care Clinic Name  [facility] 07/24/18 1145   DISCHARGE PLANNING      Primary Care MD Name  sAtrid Marin MD 07/24/18 1145   Patient/family verbalizes understanding of discharge plan recommendations?  Yes 07/26/18 1528    LIVING ENVIRONMENT     Medical Team notified of plan?  yes 07/26/18 1528    Lives With  spouse 07/24/18 1145   Transportation Available  family or friend will provide 07/26/18 1528    Living Arrangements  house (Here on Vacation, lives in [place]) 07/24/18 1145   Discharge Planning Comments  follow up in 2 weeks 07/26/18 1528    Able to Return to Prior Living Arrangements  yes 07/24/18 1145   FINAL RESOURCES      Living Arrangement Comments  -- (In [place] visiting spouse lives in [place]) 07/24/18 1145   Equipment Currently Used at Home  none (has shower chair, grab bars) 07/24/18 1234    ASSESSMENT OF FAMILY/SOCIAL SUPPORT     Resources List  Home Care;Skilled Nursing Facility 07/24/18 1145    Marital Status   07/24/18 1145   [facility] [phone], Fax: [phone] 07/24/18 1145    Who is your support system?   07/24/18 1145   PAS Number  [id number] 07/25/18 1524    Spouse's Name  Luigi 07/24/18 1145   ABUSE RISK SCREEN      Description of Support System  Supportive;Involved 07/24/18 1145   QUESTION TO PATIENT:  Has a member of your family or a partner(now or in the past) intimidated, hurt, manipulated, or controlled you in any way?  no 07/23/18 1446    Support Assessment  Lacks adequate physical care (Souse in quadriplegic)  07/24/18 1145   QUESTION TO PATIENT: Do you feel safe going back to the place where you are living?  yes 07/23/18 1446    COPING/STRESS     OBSERVATION: Is there reason to believe there has been maltreatment of a vulnerable adult (ie. Physical/Sexual/Emotional abuse, self neglect, lack of adequate food, shelter, medical care, or financial exploitation)?  no 07/23/18 1446    Major Change/Loss/Stressor  surgery/procedure 07/20/18 1215   OTHER      EXPECTED DISCHARGE     Are you depressed or being treated for depression?  No 07/23/18 1448

## 2018-07-23 NOTE — IP AVS SNAPSHOT
` `     [facility] SURGICAL: [phone]            Medication Administration Report for Preeti Sherman as of 07/26/18 7277   Legend:    Given Hold Not Given Due Canceled Entry Other Actions    Time Time (Time) Time  Time-Action       Inactive    Active    Linked        Medications 07/20/18 07/21/18 07/22/18 07/23/18 07/24/18 07/25/18 07/26/18    acetaminophen (TYLENOL) tablet 650 mg  Dose: 650 mg  Freq: EVERY 4 HOURS PRN Route: PO  PRN Reason: other  PRN Comment: multimodal surgical pain management along with NSAIDS and opioid medication as indicated based on pain control and physical function.  Start: 07/26/18 0000   Admin Instructions: May give first dose 4 hours after last scheduled dose of acetaminophen  Maximum acetaminophen dose from all sources = 75 mg/kg/day not to exceed 4 grams/day.    Admin. Amount: 2 tablet (2 × 325 mg tablet)  Dispense Loc: [facility]  POC: Post-procedure               benzocaine-menthol (CEPACOL) 15-3.6 MG lozenge 1-2 lozenge  Dose: 1-2 lozenge  Freq: EVERY 1 HOUR PRN Route: BU  PRN Reason: sore throat  PRN Comment: sore throat without fever  Start: 07/23/18 1439   Admin. Amount: 1-2 lozenge  Dispense Loc: [facility]  POC: Post-procedure               citalopram (celeXA) tablet 20 mg  Dose: 20 mg  Freq: EVERY MORNING Route: PO  Start: 07/24/18 0800   Admin. Amount: 1 tablet (1 × 20 mg tablet)  Last Admin: 07/26/18 0749  Dispense Loc: [facility]         0834 (20 mg)-Given        0749 (20 mg)-Given        0749 (20 mg)-Given           enoxaparin (LOVENOX) injection 40 mg  Dose: 40 mg  Freq: EVERY 24 HOURS Route: SC  Start: 07/24/18 1200   Admin Instructions: Check to make sure start date/time is 12-24 hours post op unless documented complication, AND no sooner than 22 hours post op if spinal anesthesia used.   Continue until discharge to home. HOLD if platelet count falls below 50% of baseline or less than 100,000/ L and notify provider.    Admin. Amount: 40  "mg = 0.4 mL Conc: 40 mg/0.4 mL  Last Admin: 07/26/18 1132  Dispense Loc: [facility]  Volume: 0.4 mL  POC: Post-procedure         1214 (40 mg)-Given        1136 (40 mg)-Given        1132 (40 mg)-Given           hydrOXYzine (ATARAX) tablet 25 mg  Dose: 25 mg  Freq: EVERY 6 HOURS PRN Route: PO  PRN Reason: itching  Start: 07/23/18 1439   Admin Instructions: Caution to be used when administering multiple Central Nervous System (CNS) depressing meds within a short time frame.    Admin. Amount: 1 tablet (1 × 25 mg tablet)  Last Admin: 07/26/18 1420  Dispense Loc: [facility]  POC: Post-procedure        2116 (25 mg)-Given        0654 (25 mg)-Given       1410 (25 mg)-Given       2014 (25 mg)-Given        0241 (25 mg)-Given       0822 (25 mg)-Given       1437 (25 mg)-Given       2030 (25 mg)-Given        1420 (25 mg)-Given           lidocaine (LMX4) kit  Freq: EVERY 1 HOUR PRN Route: Top  PRN Reason: pain  PRN Comment: with VAD insertion or accessing implanted port.  Start: 07/23/18 1439   Admin Instructions: Do NOT give if patient has a history of allergy to any local anesthetic or any \"maite\" product.   Apply 30 minutes prior to VAD insertion or port access.  MAX Dose:  2.5 g (  of 5 g tube)    Dispense Loc: [facility]  POC: Post-procedure               lidocaine 1 % 1 mL  Dose: 1 mL  Freq: EVERY 1 HOUR PRN Route: OTHER  PRN Comment: mild pain with VAD insertion or accessing implanted port  Start: 07/23/18 1439   Admin Instructions: Do NOT give if patient has a history of allergy to any local anesthetic or any \"maite\" product. MAX dose 1 mL subcutaneous OR intradermal in divided doses.    Admin. Amount: 1 mL  Dispense Loc: [facility]  Volume: 5 mL  POC: Post-procedure               morphine (PF) injection 2-4 mg  Dose: 2-4 mg  Freq: EVERY 2 HOURS PRN Route: IV  PRN Reason: other  PRN Comment: pain control or improvement in physical function. Hold dose for analgesic side effects.  Start: 07/23/18 1439 "   Admin Instructions: Start at the lowest dose. May adjust dose by 1 mg every 2 hours as needed.  Notify provider to assess for uncontrolled pain or analgesic side effects.  Hold while on IV PCA or with regular IV opioid dosing.  For ordered doses up to 15 mg give IV Push undiluted over 4-5 minutes.    Admin. Amount: 2-4 mg  Last Admin: 07/23/18 2116  Dispense Loc: [facility]  POC: Post-procedure        2049 (2 mg)-Given       2116 (2 mg)-Given              naloxone (NARCAN) injection 0.1-0.4 mg  Dose: 0.1-0.4 mg  Freq: EVERY 2 MIN PRN Route: IV  PRN Reason: opioid reversal  Start: 07/23/18 1439   Admin Instructions: For respiratory rate LESS than or EQUAL to 8.  Partial reversal dose:  0.1 mg titrated q 2 minutes for Analgesia Side Effects Monitoring Sedation Level of 3 (frequently drowsy, arousable, drifts to sleep during conversation).Full reversal dose:  0.4 mg bolus for Analgesia Side Effects Monitoring Sedation Level of 4 (somnolent, minimal or no response to stimulation).  For ordered doses up to 2mg give IVP. Give each 0.4mg over 15 seconds in emergency situations. For non-emergent situations further dilute in 9mL of NS to facilitate titration of response.    Admin. Amount: 0.1-0.4 mg = 0.25-1 mL Conc: 0.4 mg/mL  Dispense Loc: [facility] 200  Volume: 1 mL  POC: Post-procedure               ondansetron (ZOFRAN-ODT) ODT tab 4 mg  Dose: 4 mg  Freq: EVERY 6 HOURS PRN Route: PO  PRN Reasons: nausea,vomiting  Start: 07/23/18 1439   Admin Instructions: This is Step 1 of nausea and vomiting management.  If nausea not resolved in 15 minutes, go to Step 2 prochlorperazine (COMPAZINE). Do not push through foil backing. Peel back foil and gently remove. Place on tongue immediately. Administration with liquid unnecessary  With dry hands, peel back foil backing and gently remove tablet; do not push oral disintegrating tablet through foil backing; administer immediately on tongue and oral disintegrating tablet  dissolves in seconds; then swallow with saliva; liquid not required.    Admin. Amount: 1 tablet (1 × 4 mg tablet)  Dispense Loc: [facility] 200  POC: Post-procedure              Or  ondansetron (ZOFRAN) injection 4 mg  Dose: 4 mg  Freq: EVERY 6 HOURS PRN Route: IV  PRN Reasons: nausea,vomiting  Start: 07/23/18 1439   Admin Instructions: This is Step 1 of nausea and vomiting management.  If nausea not resolved in 15 minutes, go to Step 2 prochlorperazine (COMPAZINE).  Irritant. For ordered doses up to 4 mg, give IV Push undiluted over 2-5 minutes.    Admin. Amount: 4 mg = 2 mL Conc: 4 mg/2 mL  Dispense Loc: [facility]  Infused Over: 2-5 Minutes  Volume: 2 mL  POC: Post-procedure               oxyCODONE IR (ROXICODONE) tablet 5-10 mg  Dose: 5-10 mg  Freq: EVERY 3 HOURS PRN Route: PO  PRN Reason: other  PRN Comment: pain control or improvement in physical function. Hold dose for analgesic side effects.  Start: 07/23/18 1439   Admin Instructions: Start with the lowest dose.    May adjust dose by 5 mg every 3 hours as needed.  Notify provider to assess for uncontrolled pain or analgesic side effects. Hold while on PCA or with regular IV opioid dosing. Maximum total is 80 mg in 24 hours.    Admin. Amount: 1-2 tablet (1-2 × 5 mg tablet)  Last Admin: 07/26/18 1414  Dispense Loc: [facility] 200  POC: Post-procedure        1716 (5 mg)-Given       1839 (5 mg)-Given       2246 (10 mg)-Given        0117 (10 mg)-Given       0401 (10 mg)-Given       0654 (10 mg)-Given       1033 (10 mg)-Given       1410 (10 mg)-Given       1721 (10 mg)-Given       2008 (10 mg)-Given       2339 (10 mg)-Given        0242 (10 mg)-Given       0540 (10 mg)-Given       0822 (10 mg)-Given       1135 (10 mg)-Given       1437 (10 mg)-Given       1725 (10 mg)-Given       2030 (10 mg)-Given        0131 (10 mg)-Given       0451 (10 mg)-Given       0749 (10 mg)-Given       1103 (10 mg)-Given       1414 (10 mg)-Given           prochlorperazine  (COMPAZINE) injection 10 mg  Dose: 10 mg  Freq: EVERY 6 HOURS PRN Route: IV  PRN Reasons: nausea,vomiting  Start: 07/23/18 1439   Admin Instructions: This is Step 2 of nausea and vomiting management.   If nausea not resolved in 15 minutes, give metoclopramide (REGLAN), if ordered (step 3 of nausea and vomiting management)  For ordered doses up to 10 mg, give IV Push undiluted. Each 5mg over 1 minute.    Admin. Amount: 10 mg = 2 mL Conc: 5 mg/mL  Dispense Loc: [facility]  Infused Over: 1-2 Minutes  Volume: 2 mL  POC: Post-procedure              Or  prochlorperazine (COMPAZINE) tablet 10 mg  Dose: 10 mg  Freq: EVERY 6 HOURS PRN Route: PO  PRN Reasons: nausea,vomiting  Start: 07/23/18 1439   Admin Instructions: This is Step 2 of nausea and vomiting management.   If nausea not resolved in 15 minutes, give metoclopramide (REGLAN), if ordered (step 3 of nausea and vomiting management)    Admin. Amount: 1 tablet (1 × 10 mg tablet)  Dispense Loc: [facility]  POC: Post-procedure               senna-docusate (SENOKOT-S;PERICOLACE) 8.6-50 MG per tablet 1 tablet  Dose: 1 tablet  Freq: 2 TIMES DAILY Route: PO  Start: 07/23/18 1439   Admin Instructions: If no bowel movement in 24 hours, increase to 2 tablets PO.  Hold for loose stools.    Admin. Amount: 1 tablet  Last Admin: 07/25/18 1914  Dispense Loc: [facility]  POC: Post-procedure        2103 (1 tablet)-Given               2007 (1 tablet)-Given        0749 (1 tablet)-Given       1914 (1 tablet)-Given               [ ] 2000          Or  senna-docusate (SENOKOT-S;PERICOLACE) 8.6-50 MG per tablet 2 tablet  Dose: 2 tablet  Freq: 2 TIMES DAILY Route: PO  Start: 07/23/18 1439   Admin Instructions: Hold for loose stools.    Admin. Amount: 2 tablet  Last Admin: 07/26/18 0749  Dispense Loc: [facility]  POC: Post-procedure                0835 (2 tablet)-Given                              0749 (2 tablet)-Given       [ ] 2000           sodium chloride (PF) 0.9% PF  flush 3 mL  Dose: 3 mL  Freq: EVERY 8 HOURS Route: IK  Start: 18 1445   Admin Instructions: And Q1H PRN, to lock peripheral IV dormant line.    Admin. Amount: 3 mL  Last Admin: 18 0751  Dispense Loc: [facility]  Volume: 3 mL  POC: Post-procedure        (1457)-Not Given       2117 (3 mL)-Given               (0612)-Not Given       1414 (3 mL)-Given       2344 (3 mL)-Given        0749 (3 mL)-Given       1438 (3 mL)-Given       1915 (3 mL)-Given               0135 (3 mL)-Given       0751 (3 mL)-Given       (1529)-Not Given           sodium chloride (PF) 0.9% PF flush 3 mL  Dose: 3 mL  Freq: EVERY 1 HOUR PRN Route: IK  PRN Reason: line flush  PRN Comment: for peripheral IV flush post IV meds  Start: 18 1439   Admin. Amount: 3 mL  Dispense Loc: [facility]  Volume: 3 mL  POC: Post-procedure               zolpidem (AMBIEN) tablet 5 mg  Dose: 5 mg  Freq: AT BEDTIME PRN Route: PO  PRN Reason: sleep  Start: 18   Admin Instructions: POD 1.  Do not give unless at least 6 hours of uninterrupted sleep is expected.    Admin. Amount: 1 tablet (1 × 5 mg tablet)  Dispense Loc: [facility] 200  POC: Post-procedure              Completed Medications  Medications 18         Dose: 975 mg  Freq: EVERY 8 HOURS Route: PO  Start: 18 1600   End: 18 0934   Admin Instructions: Do not use if patient has an active opioid/acetaminophen combined analgesic product ordered for pain.  Maximum acetaminophen dose from all sources = 75 mg/kg/day not to exceed 4 grams/day.    Admin. Amount: 3 tablet (3 × 325 mg tablet)  Last Admin: 18 0934  Dispense Loc: [facility]  Administrations Remainin  POC: Post-procedure        1714 (975 mg)-Given        0044 (975 mg)-Given       0834 (975 mg)-Given       1610 (975 mg)-Given       2339 (975 mg)-Given        0749 (975 mg)-Given       1534 (975 mg)-Given        0131 (975 mg)-Given       0934  (975 mg)-Given             Dose: 1 g  Freq: EVERY 8 HOURS Route: IV  Indications of Use: PERIOPERATIVE PHARMACOPROPHYLAXIS  Last Dose: 1 g (18)  Start: 18   End: 18   Admin Instructions: First post-op dose due 8 hours after intra-op dose, see eMAR.    Admin. Amount: 1 g = 50 mL Conc: 1 g/50 mL  Last Admin: 18  Dispense Loc: [facility]  Infused Over: 30 Minutes  Administrations Remainin  Volume: 50 mL  POC: Post-procedure        2103 (1 g)-New Bag        0357 (1 g)-New Bag               Dose: 300 mg  Freq: 2 TIMES DAILY Route: PO  Start: 18   End: 18   Admin. Amount: 1 capsule (1 × 300 mg capsule)  Last Admin: 18  Dispense Loc: [facility] 200  Administrations Remainin  POC: Post-procedure         (300 mg)-Given        0835 (300 mg)-Given        (300 mg)-Given        0749 (300 mg)-Given       191 (300 mg)-Given        0749 (300 mg)-Given          Discontinued Medications  Medications 18         Rate: 75 mL/hr   Freq: CONTINUOUS Route: IV  Last Dose: Stopped (18 1414)  Start: 18 1445   End: 18 1436   Admin Instructions: Change to saline lock when PO well tolerated.    Last Admin: 18 0659  Dispense Loc: [facility]  Volume: 1,000 mL  POC: Post-procedure        1455 ( )-New Bag        0046 ( )-Rate/Dose Verify       0402 ( )-New Bag       0659 ( )-Rate/Dose Verify       1414-Stopped       1436-Med Discontinued

## 2018-07-23 NOTE — OP NOTE
Procedure Date: 07/23/2018      PREOPERATIVE DIAGNOSIS:  Primary osteoarthrosis, right knee.      POSTOPERATIVE DIAGNOSIS:  Primary osteoarthrosis, right knee.      PROCEDURE:  DePuy Attune right total knee arthroplasty.      COMPONENTS:   FEMUR:  #4 cruciate-retaining, cemented.   TIBIA:  #3 mobile bearing tray, cemented.   INSERT:  #7.   PATELLA:  35.      SURGEON:  gUo Brady MD      ASSISTANT:  Mac Rodriguez PA-C      ANESTHESIA:  Spinal.      ESTIMATED BLOOD LOSS:  Minimal.      TOURNIQUET TIME:  Approximately 50 minutes at 250 mmHg.      COMPLICATIONS:  None apparent.      INDICATIONS:  Preeti is a 62-year-old white female who presented with end-stage primary osteoarthrosis of the right knee recalcitrant to conservative treatment.  After alternatives, risks, and possible complications were carefully discussed, the patient desired surgical intervention; therefore, consent was obtained.      OPERATION:  The patient was brought to the main operating room after spinal anesthesia was obtained and positioned supine.  Tourniquet was placed on the right proximal thigh.  Two grams of Ancef were given intravenously.  Right lower extremity was prepped and draped in the usual sterile fashion.  The limb was elevated and tourniquet inflated.      A linear longitudinal incision was made for an anterior approach to the right knee.  Subcutaneous tissue was divided sharply.  A paramedian arthrotomy was made.  Effusion was evacuated.  I then resected the undersurface of patella with an oscillating saw, removed bony fragments and placed lug holes for a 35-mm insert.  Metal tray was placed over the cut surfaces.  I retracted the patella laterally.      With the knee in flexion and retractors in place, I cut the distal femur with the assistance of an intramedullary guide to fit a #4 femoral component.  Extramedullary guide was used to assist in cutting the proximal tibial plateau, bony fragments excised.  I then utilized the  laminar  to assist with resection of the remains of the meniscus and posterior osteophytes and debris was removed.      With trial components in place, no soft tissue release was required.  We immediately had excellent range of motion and good stability.  Therefore, I corrected for rotation, made cruciform cuts in the proximal tibial plateau, placed lug holes in the distal femur and removed trial components.      While we mixed a batch of bone cement on the back table, we pulse lavaged surfaces clean and meticulously dried them.  We then sequentially cemented the tibial tray, the femoral component and the patella.  An 8 mm trial insert was placed in the knee and brought out in extension.  Axial compression was held while I removed excess cement in the doughy stage.      Once cement solidified, we removed the trial spacer, removed the patellar clamp, pulse lavaged surfaces clean and removed excess bone and bone cement debris.  Again, we performed trial reduction and elected to go with a 7 mm insert which was loaded into place.  Once again, we had full extension, full flexion, stable varus and valgus stress throughout the arc of motion, the patella tracked centrally and no spinout was present.  Therefore, we soaked the knee in weak Betadine solution, pulse lavaged surfaces clean and placed a drain deep within the wound.  We then closed the deep fascia with #1 Stratafix, closed the subcutaneous tissue with 2-0 Stratafix and completed closure with 3-0 Stratafix.  Prineo was placed over the wound, followed by a sterile compression bandage, Hemovac connected to suction and tourniquet was deflated.  The patient was returned to [facility] in stable condition without apparent complication.         JESSICA BERNARDO MD             D: 2018   T: 2018   MT: HERMINIO      Name:     GINGER CHRISTIANSON   MRN:      [id number]        Account:        [id number]   :      1955           Procedure Date: 2018       Document: [id number]

## 2018-07-23 NOTE — IP AVS SNAPSHOT
"          [facility] SURGICAL: [phone]                                              INTERAGENCY TRANSFER FORM - LAB / IMAGING / EKG / EMG RESULTS   2018                    Hospital Admission Date: 2018  GINGER CHRISTIANSON   : 1955  Sex: Female        Attending Provider: Ugo Brady MD     Allergies:  Penicillins, Sulfa Drugs    Infection:  None   Service:  SURGERY    Ht:  1.537 m (5' 0.51\")   Wt:  59.9 kg (132 lb)   Admission Wt:  59.9 kg (132 lb)    BMI:  25.35 kg/m 2   BSA:  1.6 m 2            Patient PCP Information     Provider PCP Type    Astrid Marin PA-C General         Lab Results - 3 Days      Platelet count [[id number]]  Resulted: 18 0559, Result status: Final result    Ordering provider: Ugo Brady MD  18 0000 Resulting lab: [facility]    Specimen Information    Type Source Collected On   Blood  18 0551          Components       Value Reference Range Flag Lab   Platelet Count 223 150 - 450 10e9/L  59            Glucose [[id number]]  Resulted: 18 0821, Result status: Final result    Ordering provider: Ugo Brady MD  18 0053 Resulting lab: [facility]    Specimen Information    Type Source Collected On   Blood  18 0530          Components       Value Reference Range Flag Lab   Glucose 96 70 - 99 mg/dL  59            Hemoglobin [[id number]] (Abnormal)  Resulted: 18 0537, Result status: Final result    Ordering provider: Ugo Brady MD  18 0000 Resulting lab: [facility]    Specimen Information    Type Source Collected On   Blood  18 0530          Components       Value Reference Range Flag Lab   Hemoglobin 9.6 11.7 - 15.7 g/dL L 59            Creatinine [[id number]]  Resulted: 18 0624, Result status: Final result    Ordering provider: Ugo Brady MD  18 1439 Resulting lab: [facility] "    Specimen Information    Type Source Collected On   Blood  07/24/18 0556          Components       Value Reference Range Flag Lab   Creatinine 0.72 0.52 - 1.04 mg/dL  59   GFR Estimate 83 >60 mL/min/1.7m2  59   Comment:  Non  GFR Calc   GFR Estimate If Black >90 >60 mL/min/1.7m2  59   Comment:   GFR Calc            Platelet count [[id number]]  Resulted: 07/24/18 0605, Result status: Final result    Ordering provider: Ugo Brady MD  07/23/18 1439 Resulting lab: [facility]    Specimen Information    Type Source Collected On   Blood  07/24/18 0556          Components       Value Reference Range Flag Lab   Platelet Count 253 150 - 450 10e9/L  59            Hemoglobin [[id number]] (Abnormal)  Resulted: 07/24/18 0605, Result status: Final result    Ordering provider: Ugo Brady MD  07/24/18 0001 Resulting lab: [facility]    Specimen Information    Type Source Collected On   Blood  07/24/18 0556          Components       Value Reference Range Flag Lab   Hemoglobin 10.9 11.7 - 15.7 g/dL L 59            Testing Performed By     Lab - Abbreviation Name Director Address Valid Date Range    59 - Unknown [facility] Unknown [address] [facility] [place] [address] 12/31/14 1006 - Present            Unresulted Labs (24h ago through future)    Start       Ordered    07/26/18 0600  Platelet count  (enoxaparin (LOVENOX) (Weight  kg with CrCl greater than 30 mL/min is prechecked))  EVERY THREE DAYS,   Routine     Comments:  Repeat every 3 days while on VTE prophylaxis. If no result is listed, this lab has not been done the past 365 days. LATEST LAB RESULT: Platelet Count (10e9/L)       Date                     Value                 07/20/2018               323              ----------      07/23/18 1439    Unscheduled  Hemoglobin  CONDITIONAL X 2,   Routine     Comments:  IF morning Hemoglobin result is LESS than 8.0 on  POD 1 and/or POD 2, release order and recheck Hemoglobin in the evening at 1700.  Notify Provider if second Hemoglobin result is LESS than 7.5.    07/23/18 8629      Encounter-Level Documents:     There are no encounter-level documents.      Order-Level Documents:     There are no order-level documents.

## 2018-07-23 NOTE — BRIEF OP NOTE
[facility]  Brief Operative Note      Pre-operative diagnosis: right knee osteoarthritis   Post-operative diagnosis: Same   Procedure: Total knee arthoplasty (Right)   Surgeon: Ugo Brady MD     Assistant(s): Mac Rodriguez PA-C   Anesthesia: Spinal Anesthesia   Estimated blood loss: Minimal               Drains: Hemovac   Specimens: None       Findings: See full dictated operative note for details   Complications: None                   Comments: See dictated operative report for full details     Condition: Stable   Weight bearing status: Weight bearing as tolerated   Activity: Activity as tolerated  Patient may move about with assist as indicated or with supervision   Anticoagulation plan:                 Lovenox inpatient and then  mg daily at discharge  for 42 days  Follow up plan                           Follow up in 2 week(s)

## 2018-07-23 NOTE — IP AVS SNAPSHOT
"    [facility] SURGICAL: [phone]                                              INTERAGENCY TRANSFER FORM - PHYSICIAN ORDERS   2018                    Hospital Admission Date: 2018  GINGER CHRISTIANSON   : 1955  Sex: Female        Attending Provider: Ugo Brady MD     Allergies:  Penicillins, Sulfa Drugs    Infection:  None   Service:  SURGERY    Ht:  1.537 m (5' 0.51\")   Wt:  59.9 kg (132 lb)   Admission Wt:  59.9 kg (132 lb)    BMI:  25.35 kg/m 2   BSA:  1.6 m 2            Patient PCP Information     Provider PCP Type    Astrid Marin PA-C General      ED Clinical Impression     Diagnosis Description Comment Added By Time Added    S/P total knee arthroplasty, right [Z96.651] S/P total knee arthroplasty, right [Z96.651]  Mary Cannon PA-C 2018  9:55 AM      Hospital Problems as of 2018              Priority Class Noted POA    Major depression in partial remission (H) Medium  2011 Yes    Hyperlipidemia LDL goal <130 Medium  3/19/2012 Yes    History of hepatitis B Medium  2015 Yes    Right knee DJD Medium  2018 Yes    * (Principal)S/P total knee arthroplasty, right Medium  2018 Yes      Non-Hospital Problems as of 2018              Priority Class Noted    Disorder of bursae and tendons in shoulder region Medium  7/3/2007    CARDIOVASCULAR SCREENING; LDL GOAL LESS THAN 160 Medium  2011      Code Status History     Date Active Date Inactive Code Status Order ID Comments User Context    This patient has a current code status but no historical code status.         Medication Review      START taking        Dose / Directions Comments    acetaminophen 325 MG tablet   Commonly known as:  TYLENOL        Dose:  650 mg   Take 2 tablets (650 mg) by mouth every 4 hours as needed for mild pain   Quantity:  100 tablet   Refills:  0        aspirin 325 MG EC tablet        Dose:  325 mg   Take 1 tablet (325 mg) by mouth daily   Quantity:  42 " tablet   Refills:  0        oxyCODONE IR 5 MG tablet   Commonly known as:  ROXICODONE        Dose:  5-10 mg   Take 1-2 tablets (5-10 mg) by mouth every 3 hours as needed for other (pain control or improvement in physical function. Hold dose for analgesic side effects.)   Quantity:  60 tablet   Refills:  0        senna-docusate 8.6-50 MG per tablet   Commonly known as:  SENOKOT-S;PERICOLACE        Dose:  1 tablet   Take 1 tablet by mouth 2 times daily   Quantity:  100 tablet   Refills:  0          CONTINUE these medications which may have CHANGED, or have new prescriptions. If we are uncertain of the size of tablets/capsules you have at home, strength may be listed as something that might have changed.        Dose / Directions Comments    citalopram 20 MG tablet   Commonly known as:  celeXA   This may have changed:    - how much to take  - how to take this  - when to take this  - additional instructions   Used for:  Major depressive disorder, recurrent episode, in partial remission (H)        Take 1/2 tablet (10 mg) for 1-2 weeks, then increase to 1 tablet orally daily   Quantity:  30 tablet   Refills:  1          CONTINUE these medications which have NOT CHANGED        Dose / Directions Comments    Biotin 1 MG Caps   Notes to Patient:  Resume        Dose:  1 capsule   Take 1 capsule by mouth daily   Refills:  0        Calcium 250 MG Caps   Notes to Patient:  Resume        Dose:  1 capsule   Take 1 capsule by mouth daily   Refills:  0        TURMERIC CURCUMIN PO   Notes to Patient:  Resume        Dose:  1 capsule   Take 1 capsule by mouth daily   Refills:  0                  Further instructions from your care team       Orthopedic Surgery Discharge Instructions    1. Follow up:  Follow up with Mac Lord PA-C.  in 2 weeks for post op check and x rays as scheduled.  Call [phone] if appointment needed or questions. If you have questions or concerns while at home, please call [facility]. If it is  an emergency, call 911. You may find the answers to questions in the included discharge packet from the hospital or your pre operative packet you received in clinic prior to surgery.    2. Pain Medication:  Use pain medication as directed. If you are prescribed narcotic pain medications (Oxycodone, Norco, Percocet, Tylenol #3, Dilaudid, or Tramadol) then you should try to wean off of them as tolerated. These are an AS NEEDED medication, so if you are not having significant pain you should try to take fewer pills at a time or spread out the doses out over a longer period of time than is written on the prescription. You should not drive a car or operate machinery if you are taking prescribed narcotic pain medication. You may not receive a refill on this medication by your surgical team until your follow up appointment, so try to wean off your narcotics as soon as possible. If you need a refill on this medication you may call your surgical team to discuss during business hours. Refills are not given on the weekend under any circumstances, so plan accordingly.    3. How to wean narcotics: Within 2-3 days of surgery you should be able to begin weaning your pain medications. If upon leaving the hospital you are taking 2 tabs every 3-4 hours, you should decrease this to 1 tab every 3-4 hours. Around 4-5 days after surgery you should begin decreasing the frequency of your pain medication to every 4-5 hours. You may also cut your pills in half to decrease the dose as you begin the weaning process. Create a weaning schedule of your pain medication when you get home from the hospital, you may be expected to make the prescription last until your next appointment. Call your surgical team during business hours to discuss your pain medication if you have questions or concerns about the weaning process.    4. Applying ice to your incision: ice can provide additional pain relief at home. Apply  ice in 20 minute intervals. Allow your  skin to warm up to room temperature, approximately 40 minutes, before applying another ice pack.    5. Incision instructions:  Keep your incision clean, covered and dry until your post op appointment.  You may shower and get the incision wet if no drainage is present.  You may change your dressing as needed.  No additional adhesives should be put on knee.  Only use dry  guaze over Prineo glue tape and then apply 4 inch ACE wrap to hold dressings in place.     6. Blood Clot Prevention: Take Aspirin 325 mg  daily  for 42 days for anticoagulation. If you develop abdominal pain or have signs of bleeding - blood in stool or black stools, stop taking the medication and seek medical care. Walk often at home, walking will help reduce the risk of blood clots. If you have been prescribed abe stockings or compression stockings, wear them during the day until you follow up with your orthopedic team in clinic. If you were not given Abe Stockings in the hospital but would like them, they can be purchased over the counter at your local pharmacy.     7. Call the office if you have any questions/concerns or are experiencing the following:  - increasing drainage from the incision  - foul-smelling or malodorous drainage from the incision  - sudden increase or change in pain that is not controlled by your prescribed medications  - inability to urinate  - new onset of weakness, numbness or severe pain in the extremities  - bowel/bladder incontinence  - Fever greater than 101.5 degrees  - Nausea/vomitting causing inability to eat food or medications          Summary of Visit     Reason for your hospital stay       Right total knee arthroplasty             After Care     Activity - Up with assistive device           Advance Diet as Tolerated       Follow this diet upon discharge: Orders Placed This Encounter      Advance Diet as Tolerated: Regular Diet Adult       General info for SNF       Length of Stay Estimate: Short Term Care:  Estimated # of Days <30  Condition at Discharge: Stable  Level of care:skilled   Rehabilitation Potential: Good  Admission H&P remains valid and up-to-date: Yes  Recent Chemotherapy: N/A  Use Nursing Home Standing Orders: N/A       Mantoux instructions       Give two-step Mantoux (PPD) Per Facility Policy Yes       Weight bearing status       WBAT       Wound care       Site:   R knee  Instructions:  Leave prenio dressing intact, cover with gauze and hold in place with ACE             Referrals     Physical Therapy Adult Consult       Evaluate and treat as clinically indicated.    Reason:  R TKA             Follow-Up Appointment Instructions     Future Labs/Procedures    Follow Up and recommended labs and tests     Comments:    Follow up with Dr. Brady  in 2 weeks.      Follow-Up Appointment Instructions     Follow Up and recommended labs and tests       Follow up with Dr. Brady  in 2 weeks.             Statement of Approval     Ordered          07/26/18 1518  I have reviewed and agree with all the recommendations and orders detailed in this document.  EFFECTIVE NOW     Approved and electronically signed by:  Douglas Rodriguez MD           07/26/18 0955  I have reviewed and agree with all the recommendations and orders detailed in this document.  EFFECTIVE NOW     Approved and electronically signed by:  Mary Cannon PA-C

## 2018-07-23 NOTE — IP AVS SNAPSHOT
` ` Patient Information     Patient Name Sex     Preeti Sherman ([id number]) Female 1955       Room Bed    2302 2302-60      Patient Demographics     Address Phone E-mail Address    [address] [place] [address] [phone] (Home)  [phone] (Mobile) [email or web address]      Patient Ethnicity & Race     Ethnic Group Patient Race    American White      Emergency Contact(s)     Name Relation Home Work Mobile    Luigi Sherman Spouse [phone]      NO SECONDARY CONTACT          Documents on File        Status Date Received Description       Documents for the Patient    Consent Form  07     Privacy Notice - [place] Received 07     Insurance Card  07     Face Sheet Received () 09     Consent Form  07     Consent Form  07     Consent Form  07     Waiver - Payment  07     Insurance Card  09     Face Sheet Received () 08/20/10     External Medication Information Consent Accepted () 11     Patient ID Received 18 tx dl 10.15.18    Consent for Services - Hospital/Clinic Received () 11     HIM MAGAN Authorization - File Only   MyChart Authorization    Consent for EHR Access  13 Copied from existing Consent for services - C/HOD collected on 2011    [facility] Specified Other       Consent for Services - Hospital/Clinic Received () 07/07/15     External Medication Information Consent Accepted 07/07/15     HIM MAGAN Authorization  () 12/10/15 [facility] MEDICINE    HIM MAGAN Authorization - File Only  12/14/15 [facility] 2015    Consent for Services/Privacy Notice - Hospital/Clinic       Insurance Card Received 18 HP    Consent for Services/Privacy Notice - Hospital/Clinic Received () 17     Consent for Services - Hospital and Clinic Received 18     HIE Auth Received 18     Insurance Card Received (Deleted) 11     Insurance Card Received  (Deleted) 03/07/12        Documents for the Encounter    CMS IM for Patient Signature       H/P - History and Physical  07/24/18 PRE-OP EVALUATION  7/20/18      Admission Information     Attending Provider Admitting Provider Admission Type Admission Date/Time    Ugo Brady MD Ciegler, Glenn Warren, MD Elective 07/23/18  0925    Discharge Date Hospital Service Auth/Cert Status Service Area     Surgery [facility]    Unit Room/Bed Admission Status       [facility] 2302/2302-01 Admission (Confirmed)       Admission     Complaint    right knee osteoarthritis, Right knee DJD      Hospital Account     Name Acct ID Class Status Primary Coverage    Preeti Sherman [id number] Inpatient [employer] OPEN ACCESS            Guarantor Account (for Hospital Account #[id number])     Name Relation to Pt Service Area Active? Acct Type    Preeti Sherman  FCS Yes Personal/Family    Address Phone          [address] [place] [address] [phone](H)              Coverage Information (for Hospital Account #[id number])     F/O Payor/Plan Precert #    [employer] OPEN ACCESS     Subscriber Subscriber #    Luigi Sherman [id number]    Address Phone    [address]  [place] [address] [phone]

## 2018-07-23 NOTE — PLAN OF CARE
Skin affirmation note    Admitting nurse completed full skin assessment, Vernon score and Vernon interventions. This writer agrees with the initial skin assessment findings.

## 2018-07-23 NOTE — PLAN OF CARE
"Problem: Patient Care Overview  Goal: Plan of Care/Patient Progress Review  [facility] ADMISSION NOTE    Patient admitted to room 2302 at approximately 1430 via cart from surgery. Patient was accompanied by transport tech.     Verbal SBAR report received from ASHLEY Champion prior to patient arrival.     Patient trasferred to bed via air catarina. Patient alert and oriented X 3. The patient is not having any pain.  . Admission vital signs: Blood pressure 118/85, pulse 64, temperature 98.2  F (36.8  C), temperature source Oral, resp. rate 18, height 1.537 m (5' 0.51\"), weight 59.9 kg (132 lb), SpO2 98 %. Patient was oriented to plan of care, call light, bed controls, tv, telephone, bathroom and visiting hours.     Risk Assessment    The following safety risks were identified during admission: fall. Yellow risk band applied: YES.     Skin Initial Assessment    This writer admitted this patient and completed a full skin assessment and Vernon score in the Adult PCS flowsheet. Appropriate interventions initiated as needed.     Secondary skin check completed by ASHLEY Trevizo.    Skin  Inspection of bony prominences: Full  Skin WDL: ex; characteristics  Skin Integrity: drain/device(s), incision(s)    Patient has dry rough patch on right great toe.    Vernon Risk Assessment  Sensory Perception: 3-->slightly limited  Moisture: 4-->rarely moist  Activity: 1-->bedfast  Mobility: 2-->very limited  Nutrition: 3-->adequate  Friction and Shear: 3-->no apparent problem  Vernon Score: 22  Bed Support Surface: Atmos Air mattress  Vernon Intervention(s) Implemented: encouraged fluids, HOB elevated 30 degrees or less, patient /family education on pressure injury prevention    Dana Chino        "

## 2018-07-23 NOTE — IP AVS SNAPSHOT
[facility]    [address] [address]    Phone:  [phone]    Fax:  [phone]                                       After Visit Summary   7/23/2018    Preeti Sherman    MRN: [id number]           After Visit Summary Signature Page     I have received my discharge instructions, and my questions have been answered. I have discussed any challenges I see with this plan with the nurse or doctor.    ..........................................................................................................................................  Patient/Patient Representative Signature      ..........................................................................................................................................  Patient Representative Print Name and Relationship to Patient    ..................................................               ................................................  Date                                            Time    ..........................................................................................................................................  Reviewed by Signature/Title    ...................................................              ..............................................  Date                                                            Time

## 2018-07-23 NOTE — IP AVS SNAPSHOT
` `     [facility] SURGICAL: [phone]                 INTERAGENCY TRANSFER FORM - NOTES (H&P, Discharge Summary, Consults, Procedures, Therapies)   2018                    Hospital Admission Date: 2018  GINGER SHERMAN   : 1955  Sex: Female        Patient PCP Information     Provider PCP Type    Astrid Marin PA-C General         History & Physicals      H&P signed by Sanjana Jaimes at 2018  9:22 AM      Author:  Sanjaan Jaimes Service:  (none) Author Type:  Physician    Filed:  2018  9:22 AM Date of Service:  2018  9:02 AM Creation Time:  2018  9:22 AM    Status:  Signed :  Sanjana Jaimes (Physician)     Scan on 2018  9:22 AM by Theodore Provider : PRE-OP EVALUATION  18 1          Revision History        User Key Date/Time User Provider Type Action    > [N/A] 2018  9:22 AM Theodore Provider Physician Sign                     Discharge Summaries      Discharge Summaries by Mary Cannon PA-C at 2018 10:04 AM     Author:  Mary Cannon PA-C Service:  Orthopedics Author Type:  Physician Assistant - C    Filed:  2018 10:04 AM Date of Service:  2018 10:04 AM Creation Time:  2018 10:04 AM    Status:  Attested :  Mary Cannon PA-C (Physician Assistant - C)    Cosigner:  Donovan Lipscomb MD at 2018  2:57 PM        Attestation signed by Donovan Lipscomb MD at 2018  2:57 PM        Physician Attestation   I, Donovan Lipscomb, have reviewed and discussed with the advanced practice provider their discharge plan for Ginger Sherman. I did not participate in a shared visit by interviewing or examining the patient and this should be billed as an advanced practice provider only discharge.    Donovan Lipscomb  Date of Service (when I saw the patient): I did not personally see this patient today.                                 [facility] Discharge Summary                                   [facility]     GINGER CHRISTIANSON [id number]   Age: 62 year old  PCP: Astrid Marin, [phone] 1955     Date of Admission:  7/23/2018  Date of Discharge::  7/26/2018  Discharge Physician:  Mary Cannon    Code status:  Full Code    Admission Information:  Admission Diagnosis:  right knee osteoarthritis  Right knee DJD    Post-Operative Day: 3 Days Post-Op     Reason for admission:  The patient was admitted for the following:Procedure(s) (LRB):  ARTHROPLASTY KNEE (Right)    Principal Problem:    S/P total knee arthroplasty, right  Active Problems:    Major depression in partial remission (H)    Hyperlipidemia LDL goal <130    History of hepatitis B    Right knee DJD      Allergies:  Penicillins and Sulfa drugs    Following the procedure noted above the patient was transferred to the post-op floor and started on:    Therapy:  physical therapy and occupational therapy  Anticoagulation Plan: Lovenox inpatient and then  mg daily at discharge  for 42 days  Pain Management: oxycodone and tylenol  Weight bearing status: Weight bearing as tolerated     The patient was followed and co-managed by the hospitalist service during the inpatient treatment course  Complications:  None  Consultations:  None     Pertinent Labs   Lab Results: personally reviewed.     Recent Labs   Lab Test  07/26/18   0551  07/25/18   0530  07/24/18   0556  07/20/18   1320  07/15/15   1532  03/07/12   1136  06/30/11   1131   HGB   --   9.6*  10.9*  13.5  14.2  14.1  13.8   HCT   --    --    --   40.7  42.8  42.5  41.8   MCV   --    --    --   93  93  89  91   PLT  223   --   253  323  311  311  298   NA   --    --    --    --   138  139  141          Discharge Information:  Condition at discharge: Stable  Discharge destination:  Discharged to rehabilitation facility     Medications at discharge:  Current Discharge Medication List      START taking these medications    Details   acetaminophen (TYLENOL) 325  MG tablet Take 2 tablets (650 mg) by mouth every 4 hours as needed for mild pain  Qty: 100 tablet, Refills: 0    Associated Diagnoses: S/P total knee arthroplasty, right      aspirin 325 MG EC tablet Take 1 tablet (325 mg) by mouth daily  Qty: 42 tablet, Refills: 0    Associated Diagnoses: S/P total knee arthroplasty, right      oxyCODONE IR (ROXICODONE) 5 MG tablet Take 1-2 tablets (5-10 mg) by mouth every 3 hours as needed for other (pain control or improvement in physical function. Hold dose for analgesic side effects.)  Qty: 60 tablet, Refills: 0    Associated Diagnoses: S/P total knee arthroplasty, right      senna-docusate (SENOKOT-S;PERICOLACE) 8.6-50 MG per tablet Take 1 tablet by mouth 2 times daily  Qty: 100 tablet, Refills: 0    Associated Diagnoses: S/P total knee arthroplasty, right         CONTINUE these medications which have NOT CHANGED    Details   Biotin 1 MG CAPS Take 1 capsule by mouth daily       Calcium 250 MG CAPS Take 1 capsule by mouth daily       citalopram (CELEXA) 20 MG tablet Take 1/2 tablet (10 mg) for 1-2 weeks, then increase to 1 tablet orally daily  Qty: 30 tablet, Refills: 1    Associated Diagnoses: Major depressive disorder, recurrent episode, in partial remission (H)      TURMERIC CURCUMIN PO Take 1 capsule by mouth daily                        Follow-Up Care:  Patient should be seen in the office in 10-14 days by the Orthopedic Surgeon/Physician Assistant.  Call [phone] for appointment or questions.    Mary Cannon[TB1.1]       Revision History        User Key Date/Time User Provider Type Action    > TB1.1 7/26/2018 10:04 AM Mary Cannon PA-C Physician Assistant - C Sign                     Consult Notes      Consults by Aishwarya Brian RN at 7/24/2018 11:55 AM     Author:  Aishwarya Brian RN Service:  (none) Author Type:      Filed:  7/24/2018  3:00 PM Date of Service:  7/24/2018 11:55 AM Creation Time:  7/24/2018 11:45 AM    Status:  Addendum  :  Aishwarya Brian RN ()     Consult Orders:    1. Care Transition RN/SW IP Consult [[id number]] ordered by Douglas Rodriguez MD at 07/24/18 1119                Care Transition Initial Assessment - RN  Reason For Consult: discharge planning   Met with: Patient.    DATA   Principal Problem:    S/P total knee arthroplasty, right  Active Problems:    Major depression in partial remission (H)    Hyperlipidemia LDL goal <130    History of hepatitis B    Right knee DJD       Primary Care Clinic Name: [facility]  Primary Care MD Name: Astrid Marin MD  Contact information and PCP information verified: Yes    ASSESSMENT  Cognitive Status: awake, alert and oriented.  Resources List: Home Care, Skilled Nursing Facility   Lives With: spouse in [place] and in [place]  Living Arrangements: house (Here on Vacation, lives in [place])     Description of Support System: Supportive, Involved   Who is your support system?:    Support Assessment: Lacks adequate physical care (Souse in quadriplegic)   Insurance Concerns: No Insurance issues identified      This writer met with pt, introduced self and role.  Discussed discharge planning and Medicare guidelines in regards to home care, TCU and LTC. Patient stated that she is in [place] on vacation and primarily lives in [place] independently in the community. Patients goal is to return home but, is in agreement with going to a TCU for rehab or going home to her spouses home and receiving [facility] (phone: [phone] Fax: [phone]) -PT upon discharge. Patient was provided with Medicare certified nursing home list. Pts choices are as follows [facility] Phone: (Admissions: [phone] RN Report: [phone] Fax: [phone]), [facility] (Admission phone: [phone] Main phone: [phone] Fax: [phone]) and [facility] Phone: ([phone]) Fax: ([phone]). Referrals pending. Pt was provided with Medicare  certified home care list. Pt chooses to use [facility] (phone: [phone] Fax: [phone]) (CTS to call [facility] for availability and needs referral). Patient stated that her family can provide transportation upon discharge.[PW1.1]    Patients first choice for TCU has accepted the patient. [facility] Phone: (Admissions: [phone] RN Report: [phone] Fax: [phone]),[PW1.2] CTS to[PW1.3] Call [facility] with confirmation[PW1.2] of need for TCU.[PW1.3] so the admissions dept can get prior auth for pt TCU stay.[PW1.2]    Patients second choice- [facility] (Admission phone: [phone] Main phone: [phone] Fax: [phone]) has a bed available for the patient when ready. Patients insurance does not require a 3 night stay. Patients insurance covers 85% of TCU stay, patient will be responsible for 15% of the TCU bill, and [facility] has a $20/day charge for a private room.[PW1.1] Patient has 120 days of TCU coverage available.[PW1.4]    PLAN    TCU VS HC      Discharge Planner   Discharge Plans in progress: TCU vs HC  Barriers to discharge plan: Medical stabilty  Follow up plan: CTS to follow       Entered by: Aishwarya Brian 07/24/2018 11:45 AM           Aishwarya Brian RN, Care Coordinator [phone][PW1.1]       Revision History        User Key Date/Time User Provider Type Action    > PW1.3 7/24/2018  3:00 PM Aishwarya Brian RN Case Manager Addend     PW1.2 7/24/2018 12:59 PM Aishwarya Brian RN Case Manager Addend     PW1.4 7/24/2018 11:57 AM Aishwarya Brian RN Case Manager Addend     PW1.1 7/24/2018 11:55 AM Aishwarya Brian RN Case Manager Sign                     Progress Notes - Physician (Notes from 07/23/18 through 07/26/18)      Progress Notes by Astrid Raman PA-C at 7/26/2018 11:34 AM     Author:  Astrid Raman PA-C Service:  Internal Medicine Author Type:  Physician Assistant - SHEILA    Filed:  7/26/2018  1:36 PM Date of Service:   "7/26/2018 11:34 AM Creation Time:  7/26/2018 11:34 AM    Status:  Signed :  Astrid Raman PA-C (Physician Assistant - C)         [facility] Medicine Progress Note  Date of Service:[RL1.1] 07/26/2018    Assessment & Plan[RL1.2]   Preeti Sherman is a 62 year old female who presented on 7/23/2018 for scheduled Procedure(s):  ARTHROPLASTY KNEE by Ugo Brady MD and is being followed by the hospital medicine service for co-management of acute and/or chronic perioperative medical problems.      S/p Procedure(s):  ARTHROPLASTY KNEE[RL1.1]   3 Days Post-Op[RL1.2]    Pain[RL1.1] well managed, worse with movement[RL1.3]. Hg 9.6 on 7/25/2018. Suspect associated with recent surgery and will gradually improve.   - pain control, wound cares, physical therapy, occupational therapy and DVT prophylaxis per orthopedic surgery service    Major depression in partial remission   Stable.   - continue home Celexa     Hyperlipidemia LDL goal <130  Per chart review. Not currently on any medications for this.  - Continue outpatient management     History of hepatitis B  Per chart review. Follows at [facility], records not available.    DVT Prophylaxis: as per orthopedic surgery service - Enoxaparin (Lovenox) subcutaneous while inpatient, ASA at discharge  Code Status:[RL1.1] Full Code[RL1.2]    Lines: PIV left hand   Morfin catheter: None    Discussion: Medically, the patient appears stable. Vital signs stable.    Disposition: Anticipate discharge today to TCU.     Attestation:  I have reviewed today's vital signs, notes, medications, labs and imaging.  Total time: 15 minutes    This patient was discussed with Dr. Douglas Rodriguez. Plan as above.    Astrid Raman PA-C  [facility] Medicine[RL1.1]      Interval History[RL1.2]   Patient was seen this morning.     Pain[RL1.1] well managed with current medications[RL1.3]  Activity[RL1.1] has been \"a struggle\". Though feels motivated[RL1.3] " "to continue her recovery.[RL1.4]  Good appetite.[RL1.3] S[RL1.1]lept well.[RL1.3]  No[RL1.5] BM[RL1.1],[RL1.5] only has one BM per week[RL1.3] typically[RL1.4]. She is[RL1.3] passing gas.[RL1.5] Voiding[RL1.1] regularly.[RL1.5]     Denies HA, lightheadedness, dizziness, fever, chills, chest pain, palpitations, SOB, cough, wheezes, abdominal pain, N/V/D, numbness or tingling in[RL1.1] bilateral lower extremities.[RL1.5] Right foot was a little tingly earlier[RL1.3] like it was \"asleep\"[RL1.4], now improved.[RL1.3]    Physical Exam   Temp:  [98.3  F (36.8  C)-100.4  F (38  C)] 98.8  F (37.1  C)  Pulse:  [71-76] 71  Resp:  [16-18] 18  BP: (114-139)/(67-77) 132/67  SpO2:  [96 %-98 %] 98 %[RL1.2]    Weights:[RL1.1]   Vitals:    07/23/18 1015   Weight: 59.9 kg (132 lb)    Body mass index is 25.35 kg/(m^2).[RL1.2]    General: Appears[RL1.1] well, sitting up comfortably[RL1.5]. Alert and orientated. Pleasant and cooperative. NAD. Non-toxic. Appears stated age.   CV: Regular rate, normal rhythm. Radial pulses are 2+ bilaterally. Distal pulses[RL1.1] intact[RL1.5]. Capillary refill is < 2 seconds[RL1.1] in bilateral lower extremities[RL1.5].[RL1.1] No l[RL1.5]ower extremity edema[RL1.1].[RL1.5]  Respiratory: No accessory muscle usage. Clear to auscultation bilaterally. No wheezes, crackles or rhonchi.   GI: Soft, non-tender, non-distended. Bowel sounds are normoactive in a quadrants.  Skin: Warm, dry, intact. Did not assess incision, dressing appear clean and dry.  Musculoskeletal: Muscle tone is appropriate. Moves[RL1.1] all extremities[RL1.5] freely with limited range of motion[RL1.1] right lower extremity[RL1.5] due to pain and bandaged.  Neuro: Sensation to light touch of[RL1.1] bilateral lower extremities[RL1.5] is grossly intact.[RL1.1]     Data     Recent Labs  Lab 07/26/18  0551 07/25/18  0530 07/24/18  0556 07/20/18  1320   WBC  --   --   --  9.1   HGB  --  9.6* 10.9* 13.5   MCV  --   --   --  93     --  253 " 323   CR  --   --  0.72  --    GLC  --  96  --   --          Recent Labs  Lab 07/25/18  0530   GLC 96        Unresulted Labs Ordered in the Past 30 Days of this Admission     No orders found from 5/24/2018 to 7/24/2018.[RL1.2]         Imaging[RL1.1]  No results found for this or any previous visit (from the past 24 hour(s)).[RL1.2]     I reviewed all new labs and imaging results over the last 24 hours. I personally reviewed no images or EKG's today.[RL1.1]    Medications       citalopram  20 mg Oral QAM     enoxaparin  40 mg Subcutaneous Q24H     senna-docusate  1 tablet Oral BID    Or     senna-docusate  2 tablet Oral BID     sodium chloride (PF)  3 mL Intracatheter Q8H[RL1.2]     I have discussed patient with Dr. Douglas Rodriguez.    Astrid Raman PA-C  [facility] Medicine[RL1.1]     Revision History        User Key Date/Time User Provider Type Action    > RL1.4 7/26/2018  1:36 PM Astrid Raman PA-C Physician Assistant - SHEILA Sign     RL1.3 7/26/2018  1:22 PM Astrid Raman PA-C Physician Assistant - C      RL1.5 7/26/2018 11:43 AM Astrid Raman PA-C Physician Assistant - C      RL1.2 7/26/2018 11:35 AM Astrid Raman PA-C Physician Assistant - C      RL1.1 7/26/2018 11:34 AM Astrid Raman PA-C Physician Assistant - C             Progress Notes by Aishwarya Brian RN at 7/26/2018 10:41 AM     Author:  Aishwarya Brian RN Service:  (none) Author Type:      Filed:  7/26/2018 12:13 PM Date of Service:  7/26/2018 10:41 AM Creation Time:  7/26/2018 10:41 AM    Status:  Addendum :  Aishwarya Brian RN ()         Care Transitions Discharge:[PW1.1] [facility] Phone: (Admissions: [phone] RN Report: [phone] Fax: [phone]) , TCU wants the signed dc orders  ASAP. TCU aware of disch[PW1.2]a[PW1.3]rge[PW1.2]- Family[PW1.3] transportation time of 1630[PW1.2]    Name: Preeti Sherman    MRN: [id number]    Reason for Hospitalization:  right knee osteoarthritis  Right knee DJD    Cognitive/Behavioral Status: awake, alert and oriented    Follow-up Appointments: No future appointments.    Discharge Date:[PW1.1]  7/26/2018[PW1.4], Transportation per family[PW1.1] @ 1630[PW1.2]     Patient/Care Partner in agreement and understands the discharge plan:  Yes    Discharge Disposition:  transitional care unit    Discharge Planner   Discharge Plans in progress: [facility] Phone: (Admissions: [phone] RN Report: [phone] Fax: [phone])   Barriers to discharge plan: Medical stability  Follow up plan: Ortho       Entered by: Aishwarya Brian 07/26/2018 10:41 AM         Aishwarya Brian RN Care Coordinator  [phone][PW1.1]         Revision History        User Key Date/Time User Provider Type Action    > PW1.3 7/26/2018 12:13 PM Aishwarya Brian RN Case Manager Addend     PW1.2 7/26/2018 12:10 PM Aishwarya Brian RN Case Manager Addend     PW1.4 7/26/2018 10:43 AM Aishwarya Brian RN Case Manager Sign     PW1.1 7/26/2018 10:41 AM Aishwarya Brian RN Case Manager             Progress Notes by Mary Cannon PA-C at 7/26/2018  9:51 AM     Author:  Mary Cannon PA-C Service:  Orthopedics Author Type:  Physician Assistant - C    Filed:  7/26/2018  9:53 AM Date of Service:  7/26/2018  9:51 AM Creation Time:  7/26/2018  9:51 AM    Status:  Signed :  Mary Cannon PA-C (Physician Assistant - C)         [facility] Orthopaedics Progress Note      Post-operative Day: 3 Days Post-Op    Procedure(s):  Right Total Knee Arthroplasty - Wound Class: I-Clean      Subjective:    Pain: moderate  aching to R knee. Denies shooting pain, parasthesias, numbness and weakness.   denies Chest pain, SOB, O2 required: None  denies nausea/ emesis  denies lightheadedness, dizziness and weakness  BM -, passing gas +. Urinating well, Morfin in place: no.       Objective:  Blood pressure 132/67, pulse 71, temperature 98.8  F (37.1  " C), temperature source Oral, resp. rate 18, height 1.537 m (5' 0.51\"), weight 59.9 kg (132 lb), SpO2 98 %.    Patient Vitals for the past 24 hrs:   BP Temp Temp src Pulse Resp SpO2   07/26/18 0804 132/67 98.8  F (37.1  C) Oral 71 18 98 %   07/26/18 0455 - 99  F (37.2  C) Oral - - -   07/26/18 0128 139/77 100.4  F (38  C) Oral 73 16 97 %   07/25/18 1525 114/67 98.3  F (36.8  C) Oral 76 16 96 %       Wt Readings from Last 4 Encounters:   07/23/18 59.9 kg (132 lb)   07/20/18 59.9 kg (132 lb)   07/15/15 58.1 kg (128 lb)   07/07/15 58.2 kg (128 lb 3.2 oz)     General: Alert and orientated. No apparent distress. Non-labored breathing. Appropriate affect.   MSK: R knee: dressing Clean, dry, and intact. Skin intact, no ecchymosis, no erythema. nontender to palpation to incision site. ROM appropriate for post op. Distal neurovascularly intact. Compartments soft and non-tender. No calf pain. Homans negative. PF/DF and EHL intact.       Pertinent Labs   Lab Results: personally reviewed.     Recent Labs   Lab Test  07/26/18   0551  07/25/18   0530  07/24/18   0556  07/20/18   1320  07/15/15   1532  03/07/12   1136  06/30/11   1131   HGB   --   9.6*  10.9*  13.5  14.2  14.1  13.8   HCT   --    --    --   40.7  42.8  42.5  41.8   MCV   --    --    --   93  93  89  91   PLT  223   --   253  323  311  311  298   NA   --    --    --    --   138  139  141         Procedure(s):  Right Total Knee Arthroplasty - Wound Class: I-Clean  Plan: Anticoagulation protocol: Lovenox inpatient and then  mg daily at discharge  x 42  days            Pain medications:  oxycodone and tylenol            Weight bearing status:  WBAT            Dressing Change:  sterile dry dressing change with gauze and ACE. Aliao to remain intact until follow up.             Disposition:  TCU today             Follow up: 2 week with SHANE            Continue cares and rehabilitation     Report completed by:  Mary Cannon PA-C  Date: 7/26/2018  Time: 9:51 " AM[TB1.1]       Revision History        User Key Date/Time User Provider Type Action    > TB1.1 7/26/2018  9:53 AM Mary Cannon PA-C Physician Assistant - C Sign            Progress Notes by Erika Adhikari RN at 7/25/2018 11:11 AM     Author:  Erika Adhikari RN Service:  (none) Author Type:      Filed:  7/25/2018 11:12 AM Date of Service:  7/25/2018 11:11 AM Creation Time:  7/25/2018 11:11 AM    Status:  Signed :  Erika Adhikari RN ()           Care Coordinator Progress Note    Admission Date/Time:  7/23/2018  Attending MD:  Ugo Brady MD    Data  Chart reviewed, discussed with interdisciplinary team.   Patient was admitted for: Data Unavailable.    Coordination of Care and Referrals: Provided patient/family with options for Skilled Nursing Facility, TCU.        Assessment  Patient was accepted at [facility] Phone: (Admissions: [phone] RN Report: [phone] Fax: [phone]) for admission 7/26/2018.     Plan  Anticipated Discharge Date:  7/26/2018  Anticipated Discharge Plan:  [facility] Phone: (Admissions: [phone] RN Report: [phone] Fax: [phone])     Erika Adhikari, MSN, RN, Care Coordinator  [facility] [phone]  [facility] [phone][JM1.1]       Revision History        User Key Date/Time User Provider Type Action    > JM1.1 7/25/2018 11:12 AM Erika Adhikari, ASHLEY Case Manager Sign            Progress Notes by Mary Cannon PA-C at 7/25/2018 11:08 AM     Author:  Mary Cannon PA-C Service:  Orthopedics Author Type:  Physician Assistant Alejandro SOSA    Filed:  7/25/2018 11:11 AM Date of Service:  7/25/2018 11:08 AM Creation Time:  7/25/2018 11:08 AM    Status:  Signed :  Mary Cannon PA-C (Physician Assistant - C)         [facility] Orthopaedics Progress Note      Post-operative Day: 2 Days Post-Op    Procedure(s):  Right Total Knee Arthroplasty - Wound Class:  "I-Clean      Subjective:    Pain: moderate  aching to R knee. Denies shooting pain, parasthesias, numbness and weakness.   denies Chest pain, SOB, O2 required: None  denies nausea/ emesis  denies lightheadedness, dizziness and weakness  BM -, passing gas +. Urinating well, Morfin in place: no.       Objective:  Blood pressure 121/57, pulse 72, temperature 97.8  F (36.6  C), temperature source Oral, resp. rate 16, height 1.537 m (5' 0.51\"), weight 59.9 kg (132 lb), SpO2 94 %.    Patient Vitals for the past 24 hrs:   BP Temp Temp src Pulse Resp SpO2   07/25/18 0720 121/57 97.8  F (36.6  C) Oral 72 16 94 %   07/24/18 2359 176/70 98.6  F (37  C) Oral 63 18 97 %   07/24/18 1458 117/61 98.4  F (36.9  C) Oral 67 18 94 %       Wt Readings from Last 4 Encounters:   07/23/18 59.9 kg (132 lb)   07/20/18 59.9 kg (132 lb)   07/15/15 58.1 kg (128 lb)   07/07/15 58.2 kg (128 lb 3.2 oz)     General: Alert and orientated. No apparent distress. Non-labored breathing. Appropriate affect.   MSK: R knee: dressing Clean, dry, and intact. Skin intact, no ecchymosis, no erythema. nontender to palpation to incision site. ROM appropriate for post op. Distal neurovascularly intact. Compartments soft and non-tender. No calf pain. Homans negative. PF/DF and EHL intact.       Pertinent Labs   Lab Results: personally reviewed.     Recent Labs   Lab Test  07/25/18   0530  07/24/18   0556  07/20/18   1320  07/15/15   1532  03/07/12   1136  06/30/11   1131   HGB  9.6*  10.9*  13.5  14.2  14.1  13.8   HCT   --    --   40.7  42.8  42.5  41.8   MCV   --    --   93  93  89  91   PLT   --   253  323  311  311  298   NA   --    --    --   138  139  141         Procedure(s):  Right Total Knee Arthroplasty - Wound Class: I-Clean  Plan: Anticoagulation protocol: Lovenox inpatient and then  mg daily at discharge  x 42  days            Pain medications:  oxycodone and tylenol            Weight bearing status:  WBAT            Dressing Change:  sterile " dry dressing change with gauze and ACE. Prineo to remain intact until follow up.             Disposition: TCU tomorrow             Follow up: 2 week with SHANE            Continue cares and rehabilitation     Report completed by:  Mary Cannon PA-C  Date: 7/25/2018  Time: 11:08 AM[TB1.1]       Revision History        User Key Date/Time User Provider Type Action    > TB1.1 7/25/2018 11:11 AM Mary Cannon PA-C Physician Assistant Alejandro SOSA Sign            Progress Notes by Yarelis Reed PA-C at 7/25/2018  9:44 AM     Author:  Yarelis Reed PA-C Service:  Hospitalist Author Type:  Physician Assistant Alejandro SOSA    Filed:  7/25/2018  9:48 AM Date of Service:  7/25/2018  9:44 AM Creation Time:  7/25/2018  9:44 AM    Status:  Signed :  Yarelis Reed PA-C (Physician Assistant - C)         [facility] Medicine Progress Note  Date of Service: 07/25/2018    Assessment & Plan   Preeti Sherman is a 62 year old female who presented on 7/23/2018 for scheduled Procedure(s):  ARTHROPLASTY KNEE by Ugo Brady MD and is being followed by the hospital medicine service for co-management of acute and/or chronic perioperative medical problems.      Right knee DJD  S/p Procedure(s):  ARTHROPLASTY KNEE / S/P total knee arthroplasty, right - 7/23/18  2 Days Post-Op    - pain control, wound cares, physical therapy, occupational therapy and DVT prophylaxis per orthopedic surgery service      Major depression in partial remission (H)  Stable. Taking Celexa prior to admission, continue.    Hyperlipidemia LDL goal <130  Per chart review. Not currently on any medications for this. Continue with outpatient management.      History of hepatitis B  Per chart review. Follows at [facility], records not available.      DVT Prophylaxis: as per orthopedic surgery service - Enoxaparin (Lovenox) SQ and Pneumatic Compression Devices  Code Status: Full Code    Lines: Peripheral   Morfin  catheter: Not indicated    Discussion: Medically, the patient appears stable and well    Disposition: Anticipate discharge today or tomorrow, likely to TCU. No barriers to discharge from internal medicine standpoint.     Attestation:  I have reviewed today's vital signs, notes, medications, labs and imaging.    Yarelis Reed PA-C  [facility] Hospitalist Service  Pager: [phone]       Interval History   Pain worse overnight and today compared to yesterday. Rates pain 8/10. Worse after therapies.    Otherwise feeling well. Tolerating oral intake, no abdominal pain, nausea, vomiting. Voiding well without complications or complaints. No bowel movement yet but passing flatus.    No new pain, numbness, or tingling.     Denies chest pain, palpitations, breathing difficulties, dizziness, headache.      Physical Exam   Temp:  [97.8  F (36.6  C)-98.6  F (37  C)] 97.8  F (36.6  C)  Pulse:  [63-72] 72  Resp:  [16-18] 16  BP: (117-176)/(57-70) 121/57  SpO2:  [94 %-97 %] 94 %    Weights:   Vitals:    07/23/18 1015   Weight: 59.9 kg (132 lb)    Body mass index is 25.35 kg/(m^2).    General appearance: Awake, alert, and in no apparent distress. Pleasant and conversational, speaking in full sentences.  HEENT: Moist mucus membranes, no exudate. No scleral icterus.  CV: Regular rhythm & rate, no murmurs. No edema. Peripheral pulses intact.  Respiratory: Moving air well bilaterally, no wheezing, crackles, or rhonchi.  GI: Non-distended, soft, nontender to palpation. No rebound or guarding. Normoactive bowel sounds.  Skin: Warm, dry, no rashes or ecchymoses. No mottling of skin. Right knee with ACE bandage in place, clean and dry.  Musculoskeletal / extremities: Moves all extremities equally, no obvious abnormalities.  Neurologic: No focal deficits.      Data     Recent Labs  Lab 07/25/18  0530 07/24/18  0556 07/20/18  1320   WBC  --   --  9.1   HGB 9.6* 10.9* 13.5   MCV  --   --  93   PLT  --  253 323   CR  --  0.72  --     GLC 96  --   --          Recent Labs  Lab 07/25/18  0530   GLC 96        Unresulted Labs Ordered in the Past 30 Days of this Admission     No orders found from 5/24/2018 to 7/24/2018.           Imaging  No results found for this or any previous visit (from the past 24 hour(s)).     I reviewed all new labs and imaging results over the last 24 hours. I personally reviewed no images or EKG's today.    Medications       acetaminophen  975 mg Oral Q8H     citalopram  20 mg Oral QAM     enoxaparin  40 mg Subcutaneous Q24H     gabapentin  300 mg Oral BID     senna-docusate  1 tablet Oral BID    Or     senna-docusate  2 tablet Oral BID     sodium chloride (PF)  3 mL Intracatheter Q8H       Yarelis Reed PA-C  [facility]  Pager: [phone][KW1.1]                  Revision History        User Key Date/Time User Provider Type Action    > KW1.1 7/25/2018  9:48 AM Yarelis Reed PA-C Physician Assistant - SHEILA Sign            Progress Notes by Celeste Kwon PT at 7/24/2018  3:05 PM     Author:  Celeste Kwon PT Service:  (none) Author Type:  Physical Therapist    Filed:  7/24/2018  3:07 PM Date of Service:  7/24/2018  3:05 PM Creation Time:  7/24/2018  3:05 PM    Status:  Signed :  Celeste Kwon PT (Physical Therapist)         Discharge Planner PT   Patient plan for discharge: TCU or home with home care. Pt would prefer a TCU.  Current status: Patrick-SBA for bed mobility, SBA for transfers and ambulation. Educated pt on post-op knee exercise packet.  Barriers to return to prior living situation: no assistance at home. Must be able to negotiate 10 stairs with one railing.   Recommendations for discharge: Recommend TCU or home with home care.   Rationale for recommendations: Pt would benefit from continued PT services to address limitations in ROM, strength, and functional mobility.        Entered by: Celeste Kwon 07/24/2018 3:05 PM[MH1.1]        Revision History        User Key  "Date/Time User Provider Type Action    > MH1.1 7/24/2018  3:07 PM Celeste Kwon, PT Physical Therapist Sign            Progress Notes by Mary Cannon PA-C at 7/24/2018  1:24 PM     Author:  Mary Cannon PA-C Service:  Orthopedics Author Type:  Physician Assistant Alejandro SOSA    Filed:  7/24/2018  1:25 PM Date of Service:  7/24/2018  1:24 PM Creation Time:  7/24/2018  1:24 PM    Status:  Signed :  Mary Cannon PA-C (Physician Assistant - C)         [facility] Orthopaedics Progress Note      Post-operative Day: 1 Day Post-Op    Procedure(s):  Right Total Knee Arthroplasty - Wound Class: I-Clean      Subjective:    Pain: moderate  aching to R knee. Denies shooting pain, parasthesias, numbness and weakness.   denies Chest pain, SOB, O2 required: None  denies nausea/ emesis  denies lightheadedness, dizziness and weakness  BM -, passing gas +. Urinating well, Morfin in place: no.       Objective:  Blood pressure 112/75, pulse 74, temperature 98.2  F (36.8  C), temperature source Oral, resp. rate 18, height 1.537 m (5' 0.51\"), weight 59.9 kg (132 lb), SpO2 96 %.    Patient Vitals for the past 24 hrs:   BP Temp Temp src Pulse Resp SpO2   07/24/18 0724 112/75 98.2  F (36.8  C) Oral 74 18 96 %   07/24/18 0632 151/60 - - - - -   07/24/18 0345 104/58 98.5  F (36.9  C) Oral 76 16 97 %   07/23/18 2352 116/61 98  F (36.7  C) Oral 72 18 94 %   07/23/18 2000 - 98.8  F (37.1  C) Oral - - -   07/23/18 1935 109/65 - - 67 16 95 %   07/23/18 1719 135/70 - - 70 18 97 %   07/23/18 1600 124/74 - - 69 18 95 %   07/23/18 1545 - - - - - 98 %   07/23/18 1530 123/75 - - 62 18 99 %   07/23/18 1515 - - - - - 95 %   07/23/18 1500 125/54 - - 62 18 98 %   07/23/18 1445 - - - - - 97 %   07/23/18 1435 118/85 - - 64 18 98 %   07/23/18 1355 113/66 - - 66 16 95 %   07/23/18 1341 101/63 - - 60 16 96 %       Wt Readings from Last 4 Encounters:   07/23/18 59.9 kg (132 lb)   07/20/18 59.9 kg (132 lb)   07/15/15 58.1 kg (128 lb) "   07/07/15 58.2 kg (128 lb 3.2 oz)     General: Alert and orientated. No apparent distress. Non-labored breathing. Appropriate affect.   MSK: R knee: dressing Clean, dry, and intact. Skin intact, no ecchymosis, no erythema. nontender to palpation to incision site. ROM appropriate for post op. Distal neurovascularly intact. Compartments soft and non-tender. No calf pain. Homans negative. PF/DF and EHL intact.       Pertinent Labs   Lab Results: personally reviewed.     Recent Labs   Lab Test  07/24/18   0556  07/20/18   1320  07/15/15   1532  03/07/12   1136  06/30/11   1131   HGB  10.9*  13.5  14.2  14.1  13.8   HCT   --   40.7  42.8  42.5  41.8   MCV   --   93  93  89  91   PLT  253  323  311  311  298   NA   --    --   138  139  141         Procedure(s):  Right Total Knee Arthroplasty - Wound Class: I-Clean  Plan: Anticoagulation protocol: Lovenox inpatient and then  mg daily at discharge  x 42  days            Pain medications:  oxycodone and tylenol            Weight bearing status:  WBAT            Dressing Change:  sterile dry dressing change with gauze and ACE. Prineo to remain intact until follow up.            Disposition:  TCU in 2 days             Follow up: 2 week with SHANE            Continue cares and rehabilitation     Report completed by:  Mary Cannon PA-C  Date: 7/24/2018  Time: 1:24 PM[TB1.1]       Revision History        User Key Date/Time User Provider Type Action    > TB1.1 7/24/2018  1:25 PM Mary Cannon PA-C Physician Assistant - C Sign            Progress Notes by Judy Hurt OT at 7/24/2018 12:34 PM     Author:  Judy Hurt OT Service:  (none) Author Type:  Occupational Therapist    Filed:  7/24/2018 12:34 PM Date of Service:  7/24/2018 12:34 PM Creation Time:  7/24/2018 12:34 PM    Status:  Signed :  Judy Hurt OT (Occupational Therapist)          07/24/18 1000   Quick Adds   Type of Visit Initial Occupational Therapy Evaluation   Living  "Environment   Lives With spouse   Living Arrangements house   Number of Stairs Within Home 10   Stair Railings at Home inside, present on left side   Living Environment Comment Lives in [place], but has a home in [place] where  lives.  is a quadriplegic who has care 24/7. has roll in shower with shower chair and grab bars   Self-Care   Dominant Hand right   Usual Activity Tolerance good   Current Activity Tolerance moderate   Regular Exercise yes   Activity/Exercise Type walking   Exercise Amount/Frequency 5-7 times/wk   Equipment Currently Used at Home none  (has shower chair, grab bars)   Activity/Exercise/Self-Care Comment active prior to surgery.    Functional Level Prior   Ambulation 0-->independent   Transferring 0-->independent   Toileting 0-->independent   Bathing 0-->independent   Dressing 0-->independent   Eating 0-->independent   Communication 0-->understands/communicates without difficulty   Swallowing 0-->swallows foods/liquids without difficulty   Cognition 0 - no cognition issues reported   Fall history within last six months yes   Number of times patient has fallen within last six months 1   Which of the above functional risks had a recent onset or change? ambulation;transferring   General Information   Onset of Illness/Injury or Date of Surgery - Date 07/23/18   Referring Physician Ugo Brady MD   Patient/Family Goals Statement To go to TCU prior to return home. Pt is concerned as she does not have assist upon discharge. Would need to be able to complete IADLs.    Additional Occupational Profile Info/Pertinent History of Current Problem s/p R TKA   Weight-Bearing Status - RLE weight-bearing as tolerated   Cognitive Status Examination   Orientation orientation to person, place and time   Pain Assessment   Patient Currently in Pain Yes, see Vital Sign flowsheet  (\"7/10\")   Transfer Skill: Bed to Chair/Chair to Bed   Level of [place]: Bed to Chair stand-by assist   Physical " "Assist/Nonphysical Assist: Bed to Chair 1 person assist   Weight-Bearing Restrictions weight-bearing as tolerated   Assistive Device - Transfer Skill Bed to Chair Chair to Bed Rehab Eval standard walker   Transfer Skill: Sit to Stand   Level of [place]: Sit/Stand independent   Physical Assist/Nonphysical Assist: Sit/Stand supervision   Transfer Skill: Sit to Stand weight-bearing as tolerated   Assistive Device for Transfer: Sit/Stand standard walker   Transfer Skill: Toilet Transfer   Level of [place]: Toilet independent   Physical Assist/Nonphysical Assist: Toilet supervision   Weight-Bearing Restrictions: Toilet weight-bearing as tolerated   Assistive Device rolling walker;grab bars   Upper Body Dressing   Level of [place]: Dress Upper Body independent   Lower Body Dressing   Level of [place]: Dress Lower Body independent  (B socks)   Instrumental Activities of Daily Living (IADL)   Previous Responsibilities meal prep;housekeeping;laundry   Activities of Daily Living Analysis   Impairments Contributing to Impaired Activities of Daily Living post surgical precautions   General Therapy Interventions   Planned Therapy Interventions ADL retraining   Clinical Impression   Criteria for Skilled Therapeutic Interventions Met yes, treatment indicated   OT Diagnosis decreased independence with ADLs   Influenced by the following impairments decreased ROM in knee, increased pain   Assessment of Occupational Performance 1-3 Performance Deficits   Identified Performance Deficits LB dressing, toilet transfer   Clinical Decision Making (Complexity) Low complexity   Therapy Frequency daily   Predicted Duration of Therapy Intervention (days/wks) 1x treat   Anticipated Discharge Disposition Home with Home Therapy;Transitional Care Facility   Risks and Benefits of Treatment have been explained. Yes   Patient, Family & other staff in agreement with plan of care Yes   [facility] AM-PAC  \"6 Clicks\" Daily " Activity Inpatient Short Form   1. Putting on and taking off regular lower body clothing? 4 - None   2. Bathing (including washing, rinsing, drying)? 3 - A Little   3. Toileting, which includes using toilet, bedpan or urinal? 4 - None   4. Putting on and taking off regular upper body clothing? 4 - None   5. Taking care of personal grooming such as brushing teeth? 4 - None   6. Eating meals? 4 - None   Daily Activity Raw Score (Score out of 24.Lower scores equate to lower levels of function) 23   Total Evaluation Time   Total Evaluation Time (Minutes) 8[LC1.1]        Revision History        User Key Date/Time User Provider Type Action    > LC1.1 7/24/2018 12:34 PM Judy Hurt OT Occupational Therapist Sign            Progress Notes by Yarelis Reed PA-C at 7/24/2018  9:08 AM     Author:  Yarelis Reed PA-C Service:  Hospitalist Author Type:  Physician Assistant - SHEILA    Filed:  7/24/2018 10:04 AM Date of Service:  7/24/2018  9:08 AM Creation Time:  7/24/2018  9:08 AM    Status:  Signed :  Yarelis Reed PA-C (Physician Assistant - C)         [facility]    Hospital Medicine Progress Note  Date of Service: 07/24/2018    Assessment & Plan   Preeti Sherman is a 62 year old female who presented on 7/23/2018 for scheduled Procedure(s):  ARTHROPLASTY KNEE by Ugo Brady MD and is being followed by the hospital medicine service for co-management of acute and/or chronic perioperative medical problems.      Right knee DJD  S/p Procedure(s):  ARTHROPLASTY KNEE / S/P total knee arthroplasty, right - 7/23/18  1 Day Post-Op    - pain control, wound cares, physical therapy, occupational therapy and DVT prophylaxis per orthopedic surgery service      Major depression in partial remission (H)  Stable. Taking Celexa prior to admission, continue.    Hyperlipidemia LDL goal <130  Per chart review. Not currently on any medications for this. Continue with outpatient  management.      History of hepatitis B  Per chart review.[KW1.1] Follows at [facility], records not available.[KW1.2]      DVT Prophylaxis: as per orthopedic surgery service -[KW1.1] Enoxaparin (Lovenox) SQ and Pneumatic Compression Devices[KW1.2]  Code Status: Full Code    Lines:[KW1.1] Peripheral[KW1.2]   Morfin catheter:[KW1.1] Not indicated[KW1.2]    Discussion: Medically, the patient appears[KW1.1] stable and well[KW1.2]    Disposition: Anticipate discharge[KW1.1] 1-2 days, likely to TCU[KW1.2]     Attestation:[KW1.1]  I have reviewed today's vital signs, notes, medications, labs and imaging.[KW1.2]    Yarelis Reed PA-C  [facility]  Pager: [phone]       Interval History[KW1.1]   Patient feeling tired after surgery and not getting much sleep overnight. Otherwise doing well. Feels pain is adequately controlled now (was worse overnight).     Tolerating oral intake, no abdominal pain, nausea, or vomiting. No bowel movement since surgery. Voiding well, although notes she needs to bear down slightly to initiate urine stream.    Denies pain other than knee/leg. Denies numbness/tingling.    Denies chest pain, palpitations, cough, wheeze, shortness of breath, dizziness, lightheadedness, headache.[KW1.2]      Physical Exam   Temp:  [98  F (36.7  C)-99  F (37.2  C)] 98.2  F (36.8  C)  Pulse:  [60-76] 74  Resp:  [16-18] 18  BP: ()/(54-85) 112/75  SpO2:  [94 %-99 %] 96 %    Weights:   Vitals:    07/23/18 1015   Weight: 59.9 kg (132 lb)    Body mass index is 25.35 kg/(m^2).    General appearance: Awake, alert, and in no apparent distress. Pleasant and conversational, speaking in full sentences.  HEENT: Moist mucus membranes, no exudate. No scleral icterus.  CV: Regular rhythm & rate, no murmurs.[KW1.1] No[KW1.2] edema. Peripheral pulses intact.  Respiratory: Moving air well bilaterally, no wheezing, crackles, or rhonchi.  GI: Non-distended, soft, nontender to palpation. No rebound or  guarding. Normoactive bowel sounds.  Skin: Warm, dry, no rashes or ecchymoses. No mottling of skin.[KW1.1] Right knee with ACE bandage in place, clean and dry. Hemovac drain in place.[KW1.2]  Musculoskeletal / extremities: Moves all extremities equally, no obvious abnormalities.  Neurologic: No focal deficits.      Data     Recent Labs  Lab 07/24/18  0556 07/20/18  1320   WBC  --  9.1   HGB 10.9* 13.5   MCV  --  93    323   CR 0.72  --        No results for input(s): GLC, BGM in the last 168 hours.     Unresulted Labs Ordered in the Past 30 Days of this Admission     No orders found for last 61 day(s).           Imaging  No results found for this or any previous visit (from the past 24 hour(s)).     I reviewed all new labs and imaging results over the last 24 hours. I personally reviewed[KW1.1] no images or EKG's today[KW1.2].    Medications     dextrose 5% and 0.45% NaCl + KCl 20 mEq/L 75 mL/hr at 07/24/18 0659       acetaminophen  975 mg Oral Q8H     citalopram  20 mg Oral QAM     enoxaparin  40 mg Subcutaneous Q24H     gabapentin  300 mg Oral BID     senna-docusate  1 tablet Oral BID    Or     senna-docusate  2 tablet Oral BID     sodium chloride (PF)  3 mL Intracatheter Q8H       Yarelis Reed PA-C  [facility] Hospitalist Service  Pager: [phone][KW1.1]                  Revision History        User Key Date/Time User Provider Type Action    > KW1.2 7/24/2018 10:04 AM Yarelis Reed PA-C Physician Assistant - C Sign     KW1.1 7/24/2018  9:08 AM Yarelis Reed PA-C Physician Assistant - SHEILA             Progress Notes by Celeste Kwon PT at 7/24/2018  9:54 AM     Author:  Celeste Kwon PT Service:  (none) Author Type:  Physical Therapist    Filed:  7/24/2018  9:58 AM Date of Service:  7/24/2018  9:54 AM Creation Time:  7/24/2018  9:54 AM    Status:  Signed :  Celeste Kwon, ROXANNA (Physical Therapist)         Discharge Planner PT   Patient plan for discharge: Pt to be  discharged to TCU.  Current status: SBA - Patrick for bed mobility, assist with legs getting into bed. SBA for transfers. CGA for ambulation with FWW. Pt exhibits good safety awareness.  Barriers to return to prior living situation: no assistance at home. Stairs within the home.   Recommendations for discharge: Recommend discharge to TCU or home with home care, pending pts decision.  Rationale for recommendations: Pt would benefit from continued PT services to improve strength, ROM and overall functional mobility.        Entered by: Celeste Kwon 07/24/2018 9:54 AM[MH1.1]        Revision History        User Key Date/Time User Provider Type Action    > MH1.1 7/24/2018  9:58 AM Celeste Kwon PT Physical Therapist Sign            Progress Notes by Celeste Kwon PT at 7/24/2018  9:52 AM     Author:  Celeste Kwon PT Service:  (none) Author Type:  Physical Therapist    Filed:  7/24/2018  9:53 AM Date of Service:  7/24/2018  9:52 AM Creation Time:  7/24/2018  9:52 AM    Status:  Signed :  Celeste Kwon PT (Physical Therapist)          07/24/18 0853   Quick Adds   Type of Visit Initial PT Evaluation   Living Environment   Lives With spouse   Living Arrangements house   Home Accessibility stairs (1 railing present);stairs within home   Number of Stairs to Enter Home 0   Number of Stairs Within Home 10   Stair Railings at Home inside, present on left side   Transportation Available other (see comments)  (Possible friend or agency transportation)   Living Environment Comment Lives in [place], but has a home in [place] where  lives.  is a quadriplegic who has care 24/7   Self-Care   Dominant Hand right   Usual Activity Tolerance good   Current Activity Tolerance moderate   Regular Exercise yes   Activity/Exercise Type walking   Exercise Amount/Frequency 5-7 times/wk   Equipment Currently Used at Home none   Activity/Exercise/Self-Care Comment Pt previously very active with jobs, would walk all day without  fatigue or AD   Functional Level Prior   Ambulation 0-->independent   Transferring 0-->independent   Toileting 0-->independent   Bathing 0-->independent   Dressing 0-->independent   Eating 0-->independent   Communication 0-->understands/communicates without difficulty   Swallowing 0-->swallows foods/liquids without difficulty   Cognition 0 - no cognition issues reported   Fall history within last six months yes   Number of times patient has fallen within last six months 1   Which of the above functional risks had a recent onset or change? ambulation;transferring;toileting;bathing   Prior Functional Level Comment Pt independent with functional mobility. No limitations, except for knee pain.   General Information   Onset of Illness/Injury or Date of Surgery - Date 07/24/18   Referring Physician Dr. Brady   Patient/Family Goals Statement Feel safe to go back to home in [place]. Be able to walk as both her jobs require her to walk all day.   Pertinent History of Current Problem (include personal factors and/or comorbidities that impact the POC) Pt has no one to care for her as her  is a quadriplegic. Pt does not feel safe going home at this time.   Precautions/Limitations fall precautions   Weight-Bearing Status - RLE weight-bearing as tolerated   General Info Comments Pt is very motivated to return to [facility].   Cognitive Status Examination   Orientation orientation to person, place and time   Level of Consciousness alert   Follows Commands and Answers Questions 100% of the time   Personal Safety and Judgment intact   Memory intact   Pain Assessment   Patient Currently in Pain Yes, see Vital Sign flowsheet  (1/10 in supine 7/10 with ambulation)   Integumentary/Edema   Integumentary/Edema other (describe)   Integumentary/Edema Comments Swelling, with pain in calf noted upon palpation   Range of Motion (ROM)   ROM Quick Adds Knee, Right   Right Knee Extension/Flexion ROM   Right Knee Extension/Flexion AROM - degrees  "10-73   Strength   Strength Comments Pt able to control concentrically and eccentrically SAQ and SLR.   MMT: Knee, Rehab Eval   Knee Flexion - Right Side (3+/5) fair plus, right   Knee Extension - Right Side (3/5) fair, right   Bed Mobility   Bed Mobility Comments Patrick of legs to get into bed   Transfer Skills   Transfer Comments SBA for patient safety and to remind pt to breath as her 02 drops quickly if she does not   Gait   Gait Comments Pt ambulates with step-to gait pattern due to pain. Decreased marleny and step-length.   General Therapy Interventions   Planned Therapy Interventions gait training;ROM;strengthening;transfer training   Intervention Comments PT will work on strengthening R LE muscles, ROM exercises, and gait training.    Clinical Impression   Criteria for Skilled Therapeutic Intervention yes, treatment indicated   PT Diagnosis s/p R total knee arthroplasty   Influenced by the following impairments strength, ROM, edema, pain   Functional limitations due to impairments Bed mobility, transfers and ambulation   Clinical Presentation Stable/Uncomplicated   Clinical Presentation Rationale clinical judgement   Clinical Decision Making (Complexity) Low complexity   Therapy Frequency` 2 times/day   Predicted Duration of Therapy Intervention (days/wks) 2 days   Anticipated Equipment Needs at Discharge front wheeled walker   Anticipated Discharge Disposition Transitional Care Facility  (Pt would prefer TCU as she has no help at home)   Risk & Benefits of therapy have been explained Yes   Patient, Family & other staff in agreement with plan of care Yes   Clinical Impression Comments Pt would benefit from continued PT interventions to address limitations in strength, ROM and functional mobility.    [facility] AM-PAC  \"6 Clicks\" V.2 Basic Mobility Inpatient Short Form   1. Turning from your back to your side while in a flat bed without using bedrails? 4 - None   2. Moving from lying on your back to " sitting on the side of a flat bed without using bedrails? 4 - None   3. Moving to and from a bed to a chair (including a wheelchair)? 4 - None   4. Standing up from a chair using your arms (e.g., wheelchair, or bedside chair)? 3 - A Little   5. To walk in hospital room? 3 - A Little   6. Climbing 3-5 steps with a railing? 3 - A Little   Basic Mobility Raw Score (Score out of 24.Lower scores equate to lower levels of function) 21   Total Evaluation Time   Total Evaluation Time (Minutes) 15   Celeste Kwon PT[MH1.1]     Revision History        User Key Date/Time User Provider Type Action    > MH1.1 7/24/2018  9:53 AM Celeste Kwon, PT Physical Therapist Sign            Progress Notes by Vaishali Rivero RN at 7/24/2018  1:49 AM     Author:  Vaishali Rivero RN Service:  (none) Author Type:  Registered Nurse    Filed:  7/24/2018  1:54 AM Date of Service:  7/24/2018  1:49 AM Creation Time:  7/24/2018  1:49 AM    Status:  Signed :  Vaishali Rivero RN (Registered Nurse)         Pt pedal pulse on right foot weaker than left on previous shift. Night shift unable to palpate pedal pulse on right foot, used doppler, very faint, unwrapped and rewrapped ace bandage looser, original was too tight around thigh, rolling. Afterward able to palpate pedal pulses w/o doppler, color is good but that right foot is cooler to the touch than the left. Will continue to monitor.[SS1.1]     Revision History        User Key Date/Time User Provider Type Action    > SS1.1 7/24/2018  1:54 AM Vaishali Rivero RN Registered Nurse Sign            Progress Notes by Chante Hurt RN at 7/23/2018  8:50 PM     Author:  Chante Hurt RN Service:  Orthopedics Author Type:  Registered Nurse    Filed:  7/24/2018 12:44 AM Date of Service:  7/23/2018  8:50 PM Creation Time:  7/24/2018 12:39 AM    Status:  Signed :  Chante Hurt RN (Registered Nurse)         Pt requesting to use commode to void. Pt reported  feeling returned to feet, and 7/10 knee pain. Medicated w/ 2mg morphine IV then assisted to sit at side of bed to dangle, then stood and pivoted w/ 1 assist, gait belt and walker to bedside commode to void. Pt tolerated activity well, but reported increased pain in knee after activity. Additional 2mg morphine given.[PC1.1]     Revision History        User Key Date/Time User Provider Type Action    > PC1.1 7/24/2018 12:44 AM Chante Hurt, RN Registered Nurse Sign                  Procedure Notes     No notes of this type exist for this encounter.         Progress Notes - Therapies (Notes from 07/23/18 through 07/26/18)      Progress Notes by Celeste Kwon PT at 7/24/2018  3:05 PM     Author:  Celeste Kwon PT Service:  (none) Author Type:  Physical Therapist    Filed:  7/24/2018  3:07 PM Date of Service:  7/24/2018  3:05 PM Creation Time:  7/24/2018  3:05 PM    Status:  Signed :  Celeste Kwon PT (Physical Therapist)         Discharge Planner PT   Patient plan for discharge: TCU or home with home care. Pt would prefer a TCU.  Current status: Patrick-SBA for bed mobility, SBA for transfers and ambulation. Educated pt on post-op knee exercise packet.  Barriers to return to prior living situation: no assistance at home. Must be able to negotiate 10 stairs with one railing.   Recommendations for discharge: Recommend TCU or home with home care.   Rationale for recommendations: Pt would benefit from continued PT services to address limitations in ROM, strength, and functional mobility.        Entered by: Celeste Kwon 07/24/2018 3:05 PM[MH1.1]        Revision History        User Key Date/Time User Provider Type Action    > MH1.1 7/24/2018  3:07 PM Celeste Kwon, PT Physical Therapist Sign            Progress Notes by Judy Hurt OT at 7/24/2018 12:34 PM     Author:  Judy Hurt OT Service:  (none) Author Type:  Occupational Therapist    Filed:  7/24/2018 12:34 PM Date of Service:  7/24/2018  12:34 PM Creation Time:  7/24/2018 12:34 PM    Status:  Signed :  Judy Hurt OT (Occupational Therapist)          07/24/18 1000   Quick Adds   Type of Visit Initial Occupational Therapy Evaluation   Living Environment   Lives With spouse   Living Arrangements house   Number of Stairs Within Home 10   Stair Railings at Home inside, present on left side   Living Environment Comment Lives in [place], but has a home in [place] where  lives.  is a quadriplegic who has care 24/7. has roll in shower with shower chair and grab bars   Self-Care   Dominant Hand right   Usual Activity Tolerance good   Current Activity Tolerance moderate   Regular Exercise yes   Activity/Exercise Type walking   Exercise Amount/Frequency 5-7 times/wk   Equipment Currently Used at Home none  (has shower chair, grab bars)   Activity/Exercise/Self-Care Comment active prior to surgery.    Functional Level Prior   Ambulation 0-->independent   Transferring 0-->independent   Toileting 0-->independent   Bathing 0-->independent   Dressing 0-->independent   Eating 0-->independent   Communication 0-->understands/communicates without difficulty   Swallowing 0-->swallows foods/liquids without difficulty   Cognition 0 - no cognition issues reported   Fall history within last six months yes   Number of times patient has fallen within last six months 1   Which of the above functional risks had a recent onset or change? ambulation;transferring   General Information   Onset of Illness/Injury or Date of Surgery - Date 07/23/18   Referring Physician Ugo Brady MD   Patient/Family Goals Statement To go to TCU prior to return home. Pt is concerned as she does not have assist upon discharge. Would need to be able to complete IADLs.    Additional Occupational Profile Info/Pertinent History of Current Problem s/p R TKA   Weight-Bearing Status - RLE weight-bearing as tolerated   Cognitive Status Examination   Orientation orientation to  "person, place and time   Pain Assessment   Patient Currently in Pain Yes, see Vital Sign flowsheet  (\"7/10\")   Transfer Skill: Bed to Chair/Chair to Bed   Level of [place]: Bed to Chair stand-by assist   Physical Assist/Nonphysical Assist: Bed to Chair 1 person assist   Weight-Bearing Restrictions weight-bearing as tolerated   Assistive Device - Transfer Skill Bed to Chair Chair to Bed Rehab Eval standard walker   Transfer Skill: Sit to Stand   Level of [place]: Sit/Stand independent   Physical Assist/Nonphysical Assist: Sit/Stand supervision   Transfer Skill: Sit to Stand weight-bearing as tolerated   Assistive Device for Transfer: Sit/Stand standard walker   Transfer Skill: Toilet Transfer   Level of [place]: Toilet independent   Physical Assist/Nonphysical Assist: Toilet supervision   Weight-Bearing Restrictions: Toilet weight-bearing as tolerated   Assistive Device rolling walker;grab bars   Upper Body Dressing   Level of [place]: Dress Upper Body independent   Lower Body Dressing   Level of [place]: Dress Lower Body independent  (B socks)   Instrumental Activities of Daily Living (IADL)   Previous Responsibilities meal prep;housekeeping;laundry   Activities of Daily Living Analysis   Impairments Contributing to Impaired Activities of Daily Living post surgical precautions   General Therapy Interventions   Planned Therapy Interventions ADL retraining   Clinical Impression   Criteria for Skilled Therapeutic Interventions Met yes, treatment indicated   OT Diagnosis decreased independence with ADLs   Influenced by the following impairments decreased ROM in knee, increased pain   Assessment of Occupational Performance 1-3 Performance Deficits   Identified Performance Deficits LB dressing, toilet transfer   Clinical Decision Making (Complexity) Low complexity   Therapy Frequency daily   Predicted Duration of Therapy Intervention (days/wks) 1x treat   Anticipated Discharge Disposition Home " "with Home Therapy;Transitional Care Facility   Risks and Benefits of Treatment have been explained. Yes   Patient, Family & other staff in agreement with plan of care Yes   [facility] AM-PAC  \"6 Clicks\" Daily Activity Inpatient Short Form   1. Putting on and taking off regular lower body clothing? 4 - None   2. Bathing (including washing, rinsing, drying)? 3 - A Little   3. Toileting, which includes using toilet, bedpan or urinal? 4 - None   4. Putting on and taking off regular upper body clothing? 4 - None   5. Taking care of personal grooming such as brushing teeth? 4 - None   6. Eating meals? 4 - None   Daily Activity Raw Score (Score out of 24.Lower scores equate to lower levels of function) 23   Total Evaluation Time   Total Evaluation Time (Minutes) 8[LC1.1]        Revision History        User Key Date/Time User Provider Type Action    > LC1.1 7/24/2018 12:34 PM Judy Hurt OT Occupational Therapist Sign            Progress Notes by Celeste Kwon PT at 7/24/2018  9:54 AM     Author:  Celeste Kwon PT Service:  (none) Author Type:  Physical Therapist    Filed:  7/24/2018  9:58 AM Date of Service:  7/24/2018  9:54 AM Creation Time:  7/24/2018  9:54 AM    Status:  Signed :  Celeste Kwon PT (Physical Therapist)         Discharge Planner PT   Patient plan for discharge: Pt to be discharged to TCU.  Current status: SBA - Patrick for bed mobility, assist with legs getting into bed. SBA for transfers. CGA for ambulation with FWW. Pt exhibits good safety awareness.  Barriers to return to prior living situation: no assistance at home. Stairs within the home.   Recommendations for discharge: Recommend discharge to TCU or home with home care, pending pts decision.  Rationale for recommendations: Pt would benefit from continued PT services to improve strength, ROM and overall functional mobility.        Entered by: Celeste Kwon 07/24/2018 9:54 AM[MH1.1]        Revision History        User Key " Date/Time User Provider Type Action    > MH1.1 7/24/2018  9:58 AM Celeste Kwon, PT Physical Therapist Sign            Progress Notes by Celeste Kwon PT at 7/24/2018  9:52 AM     Author:  Celeste Kwon PT Service:  (none) Author Type:  Physical Therapist    Filed:  7/24/2018  9:53 AM Date of Service:  7/24/2018  9:52 AM Creation Time:  7/24/2018  9:52 AM    Status:  Signed :  Celeste Kwon PT (Physical Therapist)          07/24/18 0853   Quick Adds   Type of Visit Initial PT Evaluation   Living Environment   Lives With spouse   Living Arrangements house   Home Accessibility stairs (1 railing present);stairs within home   Number of Stairs to Enter Home 0   Number of Stairs Within Home 10   Stair Railings at Home inside, present on left side   Transportation Available other (see comments)  (Possible friend or agency transportation)   Living Environment Comment Lives in [place], but has a home in [place] where  lives.  is a quadriplegic who has care 24/7   Self-Care   Dominant Hand right   Usual Activity Tolerance good   Current Activity Tolerance moderate   Regular Exercise yes   Activity/Exercise Type walking   Exercise Amount/Frequency 5-7 times/wk   Equipment Currently Used at Home none   Activity/Exercise/Self-Care Comment Pt previously very active with jobs, would walk all day without fatigue or AD   Functional Level Prior   Ambulation 0-->independent   Transferring 0-->independent   Toileting 0-->independent   Bathing 0-->independent   Dressing 0-->independent   Eating 0-->independent   Communication 0-->understands/communicates without difficulty   Swallowing 0-->swallows foods/liquids without difficulty   Cognition 0 - no cognition issues reported   Fall history within last six months yes   Number of times patient has fallen within last six months 1   Which of the above functional risks had a recent onset or change? ambulation;transferring;toileting;bathing   Prior Functional Level  Comment Pt independent with functional mobility. No limitations, except for knee pain.   General Information   Onset of Illness/Injury or Date of Surgery - Date 07/24/18   Referring Physician Dr. Brady   Patient/Family Goals Statement Feel safe to go back to home in [place]. Be able to walk as both her jobs require her to walk all day.   Pertinent History of Current Problem (include personal factors and/or comorbidities that impact the POC) Pt has no one to care for her as her  is a quadriplegic. Pt does not feel safe going home at this time.   Precautions/Limitations fall precautions   Weight-Bearing Status - RLE weight-bearing as tolerated   General Info Comments Pt is very motivated to return to [facility].   Cognitive Status Examination   Orientation orientation to person, place and time   Level of Consciousness alert   Follows Commands and Answers Questions 100% of the time   Personal Safety and Judgment intact   Memory intact   Pain Assessment   Patient Currently in Pain Yes, see Vital Sign flowsheet  (1/10 in supine 7/10 with ambulation)   Integumentary/Edema   Integumentary/Edema other (describe)   Integumentary/Edema Comments Swelling, with pain in calf noted upon palpation   Range of Motion (ROM)   ROM Quick Adds Knee, Right   Right Knee Extension/Flexion ROM   Right Knee Extension/Flexion AROM - degrees 10-73   Strength   Strength Comments Pt able to control concentrically and eccentrically SAQ and SLR.   MMT: Knee, Rehab Eval   Knee Flexion - Right Side (3+/5) fair plus, right   Knee Extension - Right Side (3/5) fair, right   Bed Mobility   Bed Mobility Comments Patrick of legs to get into bed   Transfer Skills   Transfer Comments SBA for patient safety and to remind pt to breath as her 02 drops quickly if she does not   Gait   Gait Comments Pt ambulates with step-to gait pattern due to pain. Decreased marleny and step-length.   General Therapy Interventions   Planned Therapy Interventions gait  "training;ROM;strengthening;transfer training   Intervention Comments PT will work on strengthening R LE muscles, ROM exercises, and gait training.    Clinical Impression   Criteria for Skilled Therapeutic Intervention yes, treatment indicated   PT Diagnosis s/p R total knee arthroplasty   Influenced by the following impairments strength, ROM, edema, pain   Functional limitations due to impairments Bed mobility, transfers and ambulation   Clinical Presentation Stable/Uncomplicated   Clinical Presentation Rationale clinical judgement   Clinical Decision Making (Complexity) Low complexity   Therapy Frequency` 2 times/day   Predicted Duration of Therapy Intervention (days/wks) 2 days   Anticipated Equipment Needs at Discharge front wheeled walker   Anticipated Discharge Disposition Transitional Care Facility  (Pt would prefer TCU as she has no help at home)   Risk & Benefits of therapy have been explained Yes   Patient, Family & other staff in agreement with plan of care Yes   Clinical Impression Comments Pt would benefit from continued PT interventions to address limitations in strength, ROM and functional mobility.    [facility] AM-PAC  \"6 Clicks\" V.2 Basic Mobility Inpatient Short Form   1. Turning from your back to your side while in a flat bed without using bedrails? 4 - None   2. Moving from lying on your back to sitting on the side of a flat bed without using bedrails? 4 - None   3. Moving to and from a bed to a chair (including a wheelchair)? 4 - None   4. Standing up from a chair using your arms (e.g., wheelchair, or bedside chair)? 3 - A Little   5. To walk in hospital room? 3 - A Little   6. Climbing 3-5 steps with a railing? 3 - A Little   Basic Mobility Raw Score (Score out of 24.Lower scores equate to lower levels of function) 21   Total Evaluation Time   Total Evaluation Time (Minutes) 15   Celeste Kwon, PT[MH1.1]     Revision History        User Key Date/Time User Provider Type Action    > MH1.1 " 7/24/2018  9:53 AM Celeste Kwon, PT Physical Therapist Sign

## 2018-07-23 NOTE — ANESTHESIA PREPROCEDURE EVALUATION
Anesthesia Evaluation     . Pt has had prior anesthetic.     No history of anesthetic complications          ROS/MED HX    ENT/Pulmonary:  - neg pulmonary ROS     Neurologic:  - neg neurologic ROS     Cardiovascular:     (+) Dyslipidemia, ----. : . . . :. .       METS/Exercise Tolerance:  >4 METS   Hematologic:  - neg hematologic  ROS       Musculoskeletal:  - neg musculoskeletal ROS       GI/Hepatic:     (+) hepatitis type B,       Renal/Genitourinary:  - ROS Renal section negative       Endo:  - neg endo ROS       Psychiatric:     (+) psychiatric history depression      Infectious Disease:  - neg infectious disease ROS       Malignancy:         Other:                     Physical Exam  Normal systems: cardiovascular, pulmonary and dental    Airway   Mallampati: I  TM distance: >3 FB  Neck ROM: full    Dental     Cardiovascular   Rhythm and rate: regular and normal      Pulmonary    breath sounds clear to auscultation                    Anesthesia Plan      History & Physical Review  History and physical reviewed and following examination; no interval change.    ASA Status:  2 .    NPO Status:  > 6 hours    Plan for Spinal and Periph. Nerve Block for postop pain   PONV prophylaxis:  Ondansetron (or other 5HT-3), Dexamethasone or Solumedrol and Scopolamine patch       Postoperative Care  Postoperative pain management:  IV analgesics, Oral pain medications, Neuraxial analgesia and Peripheral nerve block (Single Shot).      Consents  Anesthetic plan, risks, benefits and alternatives discussed with:  Patient..                          .

## 2018-07-23 NOTE — IP AVS SNAPSHOT
MRN:[id number]                      After Visit Summary   7/23/2018    Preeti Sherman    MRN: [id number]           Thank you!     Thank you for choosing [facility] for your care. Our goal is always to provide you with excellent care. Hearing back from our patients is one way we can continue to improve our services. Please take a few minutes to complete the written survey that you may receive in the mail after you visit with us. Thank you!        Patient Information     Date Of Birth          1955        Designated Caregiver       Most Recent Value    Caregiver    Will someone help with your care after discharge? no      About your hospital stay     You were admitted on:  July 23, 2018 You last received care in the:  [facility]    You were discharged on:  July 26, 2018        Reason for your hospital stay       Right total knee arthroplasty                  Who to Call     For medical emergencies, please call 911.  For non-urgent questions about your medical care, please call your primary care provider or clinic, [phone]  For questions related to your surgery, please call your surgery clinic        Attending Provider     Provider Specialty    Ugo Brady MD Orthopaedic Surgery       Primary Care Provider Office Phone # Fax #    Astrid Marin PA-C [phone]      After Care Instructions     Activity - Up with assistive device           Advance Diet as Tolerated       Follow this diet upon discharge: Orders Placed This Encounter      Advance Diet as Tolerated: Regular Diet Adult            General info for SNF       Length of Stay Estimate: Short Term Care: Estimated # of Days <30  Condition at Discharge: Stable  Level of care:skilled   Rehabilitation Potential: Good  Admission H&P remains valid and up-to-date: Yes  Recent Chemotherapy: N/A  Use Nursing Home Standing Orders: N/A            Mantoux instructions       Give two-step Mantoux (PPD)  Per Facility Policy Yes            Weight bearing status       WBAT            Wound care       Site:   R knee  Instructions:  Leave prenio dressing intact, cover with gauze and hold in place with ACE                  Follow-up Appointments     Follow Up and recommended labs and tests       Follow up with Dr. Brady  in 2 weeks.                  Additional Services     Physical Therapy Adult Consult       Evaluate and treat as clinically indicated.    Reason:  R TKA                  Further instructions from your care team       Orthopedic Surgery Discharge Instructions    1. Follow up:  Follow up with Mac Lord PA-C.  in 2 weeks for post op check and x rays as scheduled.  Call [phone] if appointment needed or questions. If you have questions or concerns while at home, please call [facility]. If it is an emergency, call [phone]. You may find the answers to questions in the included discharge packet from the hospital or your pre operative packet you received in clinic prior to surgery.    2. Pain Medication:  Use pain medication as directed. If you are prescribed narcotic pain medications (Oxycodone, Norco, Percocet, Tylenol #3, Dilaudid, or Tramadol) then you should try to wean off of them as tolerated. These are an AS NEEDED medication, so if you are not having significant pain you should try to take fewer pills at a time or spread out the doses out over a longer period of time than is written on the prescription. You should not drive a car or operate machinery if you are taking prescribed narcotic pain medication. You may not receive a refill on this medication by your surgical team until your follow up appointment, so try to wean off your narcotics as soon as possible. If you need a refill on this medication you may call your surgical team to discuss during business hours. Refills are not given on the weekend under any circumstances, so plan accordingly.    3. How to wean narcotics: Within 2-3  days of surgery you should be able to begin weaning your pain medications. If upon leaving the hospital you are taking 2 tabs every 3-4 hours, you should decrease this to 1 tab every 3-4 hours. Around 4-5 days after surgery you should begin decreasing the frequency of your pain medication to every 4-5 hours. You may also cut your pills in half to decrease the dose as you begin the weaning process. Create a weaning schedule of your pain medication when you get home from the hospital, you may be expected to make the prescription last until your next appointment. Call your surgical team during business hours to discuss your pain medication if you have questions or concerns about the weaning process.    4. Applying ice to your incision: ice can provide additional pain relief at home. Apply  ice in 20 minute intervals. Allow your skin to warm up to room temperature, approximately 40 minutes, before applying another ice pack.    5. Incision instructions:  Keep your incision clean, covered and dry until your post op appointment.  You may shower and get the incision wet if no drainage is present.  You may change your dressing as needed.  No additional adhesives should be put on knee.  Only use dry  guaze over Prineo glue tape and then apply 4 inch ACE wrap to hold dressings in place.     6. Blood Clot Prevention: Take Aspirin 325 mg  daily  for 42 days for anticoagulation. If you develop abdominal pain or have signs of bleeding - blood in stool or black stools, stop taking the medication and seek medical care. Walk often at home, walking will help reduce the risk of blood clots. If you have been prescribed abe stockings or compression stockings, wear them during the day until you follow up with your orthopedic team in clinic. If you were not given Abe Stockings in the hospital but would like them, they can be purchased over the counter at your local pharmacy.     7. Call the office if you have any questions/concerns or are  "experiencing the following:  - increasing drainage from the incision  - foul-smelling or malodorous drainage from the incision  - sudden increase or change in pain that is not controlled by your prescribed medications  - inability to urinate  - new onset of weakness, numbness or severe pain in the extremities  - bowel/bladder incontinence  - Fever greater than 101.5 degrees  - Nausea/vomitting causing inability to eat food or medications          Pending Results     No orders found from 7/21/2018 to 7/24/2018.            Statement of Approval     Ordered          07/26/18 0955  I have reviewed and agree with all the recommendations and orders detailed in this document.  EFFECTIVE NOW     Approved and electronically signed by:  Mary Cannon PA-C             Admission Information     Date & Time Provider Department Dept. Phone    7/23/2018 Ugo Brady MD [facility] [phone]      Your Vitals Were     Blood Pressure Pulse Temperature Respirations Height Weight    132/67 (BP Location: Right arm) 71 98.8  F (37.1  C) (Oral) 18 1.537 m (5' 0.51\") 59.9 kg (132 lb)    Pulse Oximetry BMI (Body Mass Index)                98% 25.35 kg/m2          MyChart Information     [email or web address] gives you secure access to your electronic health record. If you see a primary care provider, you can also send messages to your care team and make appointments. If you have questions, please call your primary care clinic.  If you do not have a primary care provider, please call [phone] and they will assist you.        Care EveryWhere ID     This is your Care EveryWhere ID. This could be used by other organizations to access your [facility] medical records  [id number]        Equal Access to Services     [facility]: Hadii ravi cunningham Somaria elena, waaxda luqadaha, qaybta kanoel madera. So [facility] [phone].    ATENCIÓN: Si charlee ventura a wan " disposición servicios gratuitos de asistencia lingüística. Rashid jimenez [phone].    We comply with applicable federal civil rights laws and [place] laws. We do not discriminate on the basis of race, color, national origin, age, disability, sex, sexual orientation, or gender identity.               Review of your medicines      START taking        Dose / Directions    acetaminophen 325 MG tablet   Commonly known as:  TYLENOL        Dose:  650 mg   Take 2 tablets (650 mg) by mouth every 4 hours as needed for mild pain   Quantity:  100 tablet   Refills:  0       aspirin 325 MG EC tablet        Dose:  325 mg   Take 1 tablet (325 mg) by mouth daily   Quantity:  42 tablet   Refills:  0       oxyCODONE IR 5 MG tablet   Commonly known as:  ROXICODONE        Dose:  5-10 mg   Take 1-2 tablets (5-10 mg) by mouth every 3 hours as needed for other (pain control or improvement in physical function. Hold dose for analgesic side effects.)   Quantity:  60 tablet   Refills:  0       senna-docusate 8.6-50 MG per tablet   Commonly known as:  SENOKOT-S;PERICOLACE        Dose:  1 tablet   Take 1 tablet by mouth 2 times daily   Quantity:  100 tablet   Refills:  0         CONTINUE these medicines which may have CHANGED, or have new prescriptions. If we are uncertain of the size of tablets/capsules you have at home, strength may be listed as something that might have changed.        Dose / Directions    citalopram 20 MG tablet   Commonly known as:  celeXA   This may have changed:    - how much to take  - how to take this  - when to take this  - additional instructions   Used for:  Major depressive disorder, recurrent episode, in partial remission (H)        Take 1/2 tablet (10 mg) for 1-2 weeks, then increase to 1 tablet orally daily   Quantity:  30 tablet   Refills:  1         CONTINUE these medicines which have NOT CHANGED        Dose / Directions    Biotin 1 MG Caps   Notes to Patient:  Resume        Dose:  1 capsule   Take 1 capsule  by mouth daily   Refills:  0       Calcium 250 MG Caps   Notes to Patient:  Resume        Dose:  1 capsule   Take 1 capsule by mouth daily   Refills:  0       TURMERIC CURCUMIN PO   Notes to Patient:  Resume        Dose:  1 capsule   Take 1 capsule by mouth daily   Refills:  0            Where to get your medicines      These medications were sent to [facility] [place], [place] - [address]  [address], [place] [address]     Phone:  [phone]     acetaminophen 325 MG tablet    aspirin 325 MG EC tablet    senna-docusate 8.6-50 MG per tablet         Some of these will need a paper prescription and others can be bought over the counter. Ask your nurse if you have questions.     Bring a paper prescription for each of these medications     oxyCODONE IR 5 MG tablet                Protect others around you: Learn how to safely use, store and throw away your medicines at www.disposemymeds.org.        Information about OPIOIDS     PRESCRIPTION OPIOIDS: WHAT YOU NEED TO KNOW   We gave you an opioid (narcotic) pain medicine. It is important to manage your pain, but opioids are not always the best choice. You should first try all the other options your care team gave you. Take this medicine for as short a time (and as few doses) as possible.     These medicines have risks:    DO NOT drive when on new or higher doses of pain medicine. These medicines can affect your alertness and reaction times, and you could be arrested for driving under the influence (DUI). If you need to use opioids long-term, talk to your care team about driving.    DO NOT operate heave machinery    DO NOT do any other dangerous activities while taking these medicines.     DO NOT drink any alcohol while taking these medicines.      If the opioid prescribed includes acetaminophen, DO NOT take with any other medicines that contain acetaminophen. Read all labels carefully. Look for the word  acetaminophen  or  Tylenol.  Ask your  pharmacist if you have questions or are unsure.    You can get addicted to pain medicines, especially if you have a history of addiction (chemical, alcohol or substance dependence). Talk to your care team about ways to reduce this risk.    Store your pills in a secure place, locked if possible. We will not replace any lost or stolen medicine. If you don t finish your medicine, please throw away (dispose) as directed by your pharmacist. The [place] Pollution Control Agency has more information about safe disposal: https://www.pca.[facility].[place]/living-green/managing-unwanted-medications.     All opioids tend to cause constipation. Drink plenty of water and eat foods that have a lot of fiber, such as fruits, vegetables, prune juice, apple juice and high-fiber cereal. Take a laxative (Miralax, milk of magnesia, Colace, Senna) if you don t move your bowels at least every other day.              Medication List: This is a list of all your medications and when to take them. Check marks below indicate your daily home schedule. Keep this list as a reference.      Medications           Morning Afternoon Evening Bedtime As Needed    acetaminophen 325 MG tablet   Commonly known as:  TYLENOL   Take 2 tablets (650 mg) by mouth every 4 hours as needed for mild pain   Last time this was given:  975 mg on 7/26/2018  9:34 AM                                   aspirin 325 MG EC tablet   Take 1 tablet (325 mg) by mouth daily   Next Dose Due:  7/27/18                                   Biotin 1 MG Caps   Take 1 capsule by mouth daily   Notes to Patient:  Resume                                Calcium 250 MG Caps   Take 1 capsule by mouth daily   Notes to Patient:  Resume                                citalopram 20 MG tablet   Commonly known as:  celeXA   Take 1/2 tablet (10 mg) for 1-2 weeks, then increase to 1 tablet orally daily   Last time this was given:  20 mg on 7/26/2018  7:49 AM   Next Dose Due:  7/27/18                                    oxyCODONE IR 5 MG tablet   Commonly known as:  ROXICODONE   Take 1-2 tablets (5-10 mg) by mouth every 3 hours as needed for other (pain control or improvement in physical function. Hold dose for analgesic side effects.)   Last time this was given:  10 mg on 7/26/2018  2:14 PM   Next Dose Due:  7/26/18 at 5:15PM                                   senna-docusate 8.6-50 MG per tablet   Commonly known as:  SENOKOT-S;PERICOLACE   Take 1 tablet by mouth 2 times daily   Last time this was given:  2 tablets on 7/26/2018  7:49 AM   Next Dose Due:  7/26/18 at 8:00PM                                   TURMERIC CURCUMIN PO   Take 1 capsule by mouth daily   Notes to Patient:  Resume

## 2018-07-23 NOTE — ANESTHESIA POSTPROCEDURE EVALUATION
Patient: Preeti Sherman    Procedure(s):  Right Total Knee Arthroplasty - Wound Class: I-Clean    Diagnosis:right knee osteoarthritis  Diagnosis Additional Information: No value filed.    Anesthesia Type:  No value filed.    Note:  Anesthesia Post Evaluation    Patient location during evaluation: Bedside  Patient participation: Able to fully participate in evaluation  Level of consciousness: awake and alert  Pain management: adequate  Airway patency: patent  Cardiovascular status: acceptable  Respiratory status: acceptable  Hydration status: acceptable  PONV: none     Anesthetic complications: None          Last vitals:  Vitals:    07/23/18 1015 07/23/18 1324 07/23/18 1341   BP: 128/70 (!) 87/57 101/63   Pulse: 66 60    Resp: 16 16 16   Temp: 37.2  C (99  F) 36.8  C (98.2  F)    SpO2: 98% 97% 96%         Electronically Signed By: FUENTES Vega CRNA  July 23, 2018  1:49 PM

## 2018-07-23 NOTE — ANESTHESIA PROCEDURE NOTES
Peripheral nerve/Neuraxial procedure note : intrathecal  Pre-Procedure  Performed by  SANGEETHA MCCONNELL   Location:       Timeout  Correct Patient: Yes   Correct Procedure: Yes   Correct Site: Yes   Correct Laterality: Yes   Correct Position: Yes   Site Marked: Yes   .   Procedure Documentation  ASA 2  .    Procedure:    Intrathecal.  Insertion Site:L3-4  (midline approach)      Patient Prep;mask, sterile gloves, povidone-iodine 7.5% surgical scrub, patient draped.  .  Needle: Oziel tip Spinal Needle (gauge): 22  Spinal/LP Needle Length (inches): 3.5 # of attempts: 2 and  # of redirects:  2 No introducer used .       Assessment/Narrative  Paresthesias: Yes and No.  .  .  .

## 2018-07-23 NOTE — ANESTHESIA PROCEDURE NOTES
Peripheral nerve/Neuraxial procedure note : Adductor canal  Pre-Procedure  Performed by  SANGEETHA MCCONNELL   Location: post-op      Pre-Anesthestic Checklist: patient identified, IV checked, site marked, risks and benefits discussed, informed consent, monitors and equipment checked, pre-op evaluation, at physician/surgeon's request and post-op pain management    Timeout  Correct Patient: Yes   Correct Procedure: Yes   Correct Site: Yes   Correct Laterality: Yes   Correct Position: Yes   Site Marked: Yes   .   Procedure Documentation    .    Procedure:  right  Adductor canal.     Ultrasound used to identify targeted nerve, plexus, or vascular marker and placed a needle adjacent to it., Ultrasound was used to visualize the spread of the anesthetic in close proximity to the above stated nerve. A permanent image is entered into the patient's record.  Patient Prep;mask, sterile gloves, chlorhexidine gluconate and isopropyl alcohol, patient draped.  .  Needle: insulated, short bevel Needle Gauge: 20.    Needle Length (Inches) 2  Insertion Method: Single Shot.       Assessment/Narrative  Paresthesias: No.  .  The placement was negative for: blood aspirated and site bleeding.  Bolus given via needle. No blood aspirated via catheter.   Secured via.   Complications: none.

## 2018-07-24 ENCOUNTER — APPOINTMENT (OUTPATIENT)
Dept: PHYSICAL THERAPY | Facility: CLINIC | Age: 63
DRG: 470 | End: 2018-07-24
Attending: ORTHOPAEDIC SURGERY
Payer: COMMERCIAL

## 2018-07-24 ENCOUNTER — APPOINTMENT (OUTPATIENT)
Dept: OCCUPATIONAL THERAPY | Facility: CLINIC | Age: 63
DRG: 470 | End: 2018-07-24
Attending: ORTHOPAEDIC SURGERY
Payer: COMMERCIAL

## 2018-07-24 PROBLEM — Z96.651 S/P TOTAL KNEE ARTHROPLASTY, RIGHT: Status: ACTIVE | Noted: 2018-07-24

## 2018-07-24 LAB
CREAT SERPL-MCNC: 0.72 MG/DL (ref 0.52–1.04)
GFR SERPL CREATININE-BSD FRML MDRD: 83 ML/MIN/1.7M2
HGB BLD-MCNC: 10.9 G/DL (ref 11.7–15.7)
PLATELET # BLD AUTO: 253 10E9/L (ref 150–450)

## 2018-07-24 PROCEDURE — 40000133 ZZH STATISTIC OT WARD VISIT

## 2018-07-24 PROCEDURE — 25000132 ZZH RX MED GY IP 250 OP 250 PS 637: Performed by: ORTHOPAEDIC SURGERY

## 2018-07-24 PROCEDURE — 97535 SELF CARE MNGMENT TRAINING: CPT | Mod: GO

## 2018-07-24 PROCEDURE — 97116 GAIT TRAINING THERAPY: CPT | Mod: GP

## 2018-07-24 PROCEDURE — 85049 AUTOMATED PLATELET COUNT: CPT | Performed by: ORTHOPAEDIC SURGERY

## 2018-07-24 PROCEDURE — 99232 SBSQ HOSP IP/OBS MODERATE 35: CPT | Performed by: PHYSICIAN ASSISTANT

## 2018-07-24 PROCEDURE — 12000000 ZZH R&B MED SURG/OB

## 2018-07-24 PROCEDURE — 36415 COLL VENOUS BLD VENIPUNCTURE: CPT | Performed by: ORTHOPAEDIC SURGERY

## 2018-07-24 PROCEDURE — 97110 THERAPEUTIC EXERCISES: CPT | Mod: GP

## 2018-07-24 PROCEDURE — 25800025 ZZH RX 258: Performed by: ORTHOPAEDIC SURGERY

## 2018-07-24 PROCEDURE — 25000132 ZZH RX MED GY IP 250 OP 250 PS 637: Performed by: PHYSICIAN ASSISTANT

## 2018-07-24 PROCEDURE — 97165 OT EVAL LOW COMPLEX 30 MIN: CPT | Mod: GO

## 2018-07-24 PROCEDURE — 82565 ASSAY OF CREATININE: CPT | Performed by: ORTHOPAEDIC SURGERY

## 2018-07-24 PROCEDURE — 40000193 ZZH STATISTIC PT WARD VISIT

## 2018-07-24 PROCEDURE — 25000128 H RX IP 250 OP 636: Performed by: ORTHOPAEDIC SURGERY

## 2018-07-24 PROCEDURE — 97161 PT EVAL LOW COMPLEX 20 MIN: CPT | Mod: GP

## 2018-07-24 PROCEDURE — 85018 HEMOGLOBIN: CPT | Performed by: ORTHOPAEDIC SURGERY

## 2018-07-24 RX ADMIN — OXYCODONE HYDROCHLORIDE 10 MG: 5 TABLET ORAL at 04:01

## 2018-07-24 RX ADMIN — ACETAMINOPHEN 975 MG: 325 TABLET ORAL at 00:44

## 2018-07-24 RX ADMIN — SENNOSIDES AND DOCUSATE SODIUM 1 TABLET: 8.6; 5 TABLET ORAL at 20:07

## 2018-07-24 RX ADMIN — HYDROXYZINE HYDROCHLORIDE 25 MG: 25 TABLET ORAL at 06:54

## 2018-07-24 RX ADMIN — POTASSIUM CHLORIDE, DEXTROSE MONOHYDRATE AND SODIUM CHLORIDE: 150; 5; 450 INJECTION, SOLUTION INTRAVENOUS at 04:02

## 2018-07-24 RX ADMIN — HYDROXYZINE HYDROCHLORIDE 25 MG: 25 TABLET ORAL at 20:14

## 2018-07-24 RX ADMIN — GABAPENTIN 300 MG: 300 CAPSULE ORAL at 20:08

## 2018-07-24 RX ADMIN — OXYCODONE HYDROCHLORIDE 10 MG: 5 TABLET ORAL at 14:10

## 2018-07-24 RX ADMIN — ENOXAPARIN SODIUM 40 MG: 40 INJECTION SUBCUTANEOUS at 12:14

## 2018-07-24 RX ADMIN — GABAPENTIN 300 MG: 300 CAPSULE ORAL at 08:35

## 2018-07-24 RX ADMIN — ACETAMINOPHEN 975 MG: 325 TABLET ORAL at 08:34

## 2018-07-24 RX ADMIN — OXYCODONE HYDROCHLORIDE 10 MG: 5 TABLET ORAL at 01:17

## 2018-07-24 RX ADMIN — OXYCODONE HYDROCHLORIDE 10 MG: 5 TABLET ORAL at 20:08

## 2018-07-24 RX ADMIN — OXYCODONE HYDROCHLORIDE 10 MG: 5 TABLET ORAL at 23:39

## 2018-07-24 RX ADMIN — CEFAZOLIN SODIUM 1 G: 1 INJECTION, SOLUTION INTRAVENOUS at 03:57

## 2018-07-24 RX ADMIN — SENNOSIDES AND DOCUSATE SODIUM 2 TABLET: 8.6; 5 TABLET ORAL at 08:35

## 2018-07-24 RX ADMIN — CITALOPRAM HYDROBROMIDE 20 MG: 20 TABLET ORAL at 08:34

## 2018-07-24 RX ADMIN — ACETAMINOPHEN 975 MG: 325 TABLET ORAL at 23:39

## 2018-07-24 RX ADMIN — OXYCODONE HYDROCHLORIDE 10 MG: 5 TABLET ORAL at 17:21

## 2018-07-24 RX ADMIN — HYDROXYZINE HYDROCHLORIDE 25 MG: 25 TABLET ORAL at 14:10

## 2018-07-24 RX ADMIN — OXYCODONE HYDROCHLORIDE 10 MG: 5 TABLET ORAL at 06:54

## 2018-07-24 RX ADMIN — OXYCODONE HYDROCHLORIDE 10 MG: 5 TABLET ORAL at 10:33

## 2018-07-24 RX ADMIN — ACETAMINOPHEN 975 MG: 325 TABLET ORAL at 16:10

## 2018-07-24 ASSESSMENT — ACTIVITIES OF DAILY LIVING (ADL)
ADLS_ACUITY_SCORE: 11
ADLS_ACUITY_SCORE: 11
PREVIOUS_RESPONSIBILITIES: MEAL PREP;HOUSEKEEPING;LAUNDRY
ADLS_ACUITY_SCORE: 11
ADLS_ACUITY_SCORE: 10

## 2018-07-24 NOTE — PROGRESS NOTES
Pt pedal pulse on right foot weaker than left on previous shift. Night shift unable to palpate pedal pulse on right foot, used doppler, very faint, unwrapped and rewrapped ace bandage looser, original was too tight around thigh, rolling. Afterward able to palpate pedal pulses w/o doppler, color is good but that right foot is cooler to the touch than the left. Will continue to monitor.

## 2018-07-24 NOTE — PLAN OF CARE
Problem: Patient Care Overview  Goal: Plan of Care/Patient Progress Review  Discharge Planner OT   Patient plan for discharge: TCU    Current status: Pt completes supine <> sit with SBA. Independent with lower body dressing. Supervision using FWW to ambulate to/from bathroom and complete toilet transfer with simulated home set-up.     Barriers to return to prior living situation: No assistance at home. Stairs at home- however pt states there is an elevator within home, however unsure if it works.     Recommendations for discharge: Home with home therapy. Vs TCU    Rationale for recommendations: Pt has met all IP OT goals. Appropriate to address IADLs at TCU. No further IP OT needs identified.     Occupational Therapy Discharge Summary    Reason for therapy discharge:    All goals and outcomes met, no further needs identified.    Progress towards therapy goal(s). See goals on Care Plan in [facility] electronic health record for goal details.  Goals met    Therapy recommendation(s):    OT to address IADLs at nex level of care.            Entered by: Judy Hurt 07/24/2018 12:39 PM

## 2018-07-24 NOTE — PROGRESS NOTES
Pt requesting to use commode to void. Pt reported feeling returned to feet, and 7/10 knee pain. Medicated w/ 2mg morphine IV then assisted to sit at side of bed to dangle, then stood and pivoted w/ 1 assist, gait belt and walker to bedside commode to void. Pt tolerated activity well, but reported increased pain in knee after activity. Additional 2mg morphine given.

## 2018-07-24 NOTE — PROGRESS NOTES
07/24/18 0853   Quick Adds   Type of Visit Initial PT Evaluation   Living Environment   Lives With spouse   Living Arrangements house   Home Accessibility stairs (1 railing present);stairs within home   Number of Stairs to Enter Home 0   Number of Stairs Within Home 10   Stair Railings at Home inside, present on left side   Transportation Available other (see comments)  (Possible friend or agency transportation)   Living Environment Comment Lives in [place], but has a home in [place] where  lives.  is a quadriplegic who has care 24/7   Self-Care   Dominant Hand right   Usual Activity Tolerance good   Current Activity Tolerance moderate   Regular Exercise yes   Activity/Exercise Type walking   Exercise Amount/Frequency 5-7 times/wk   Equipment Currently Used at Home none   Activity/Exercise/Self-Care Comment Pt previously very active with jobs, would walk all day without fatigue or AD   Functional Level Prior   Ambulation 0-->independent   Transferring 0-->independent   Toileting 0-->independent   Bathing 0-->independent   Dressing 0-->independent   Eating 0-->independent   Communication 0-->understands/communicates without difficulty   Swallowing 0-->swallows foods/liquids without difficulty   Cognition 0 - no cognition issues reported   Fall history within last six months yes   Number of times patient has fallen within last six months 1   Which of the above functional risks had a recent onset or change? ambulation;transferring;toileting;bathing   Prior Functional Level Comment Pt independent with functional mobility. No limitations, except for knee pain.   General Information   Onset of Illness/Injury or Date of Surgery - Date 07/24/18   Referring Physician Dr. Brady   Patient/Family Goals Statement Feel safe to go back to home in [place]. Be able to walk as both her jobs require her to walk all day.   Pertinent History of Current Problem (include personal factors and/or comorbidities that impact the POC)  Pt has no one to care for her as her  is a quadriplegic. Pt does not feel safe going home at this time.   Precautions/Limitations fall precautions   Weight-Bearing Status - RLE weight-bearing as tolerated   General Info Comments Pt is very motivated to return to PLOF.   Cognitive Status Examination   Orientation orientation to person, place and time   Level of Consciousness alert   Follows Commands and Answers Questions 100% of the time   Personal Safety and Judgment intact   Memory intact   Pain Assessment   Patient Currently in Pain Yes, see Vital Sign flowsheet  (1/10 in supine 7/10 with ambulation)   Integumentary/Edema   Integumentary/Edema other (describe)   Integumentary/Edema Comments Swelling, with pain in calf noted upon palpation   Range of Motion (ROM)   ROM Quick Adds Knee, Right   Right Knee Extension/Flexion ROM   Right Knee Extension/Flexion AROM - degrees 10-73   Strength   Strength Comments Pt able to control concentrically and eccentrically SAQ and SLR.   MMT: Knee, Rehab Eval   Knee Flexion - Right Side (3+/5) fair plus, right   Knee Extension - Right Side (3/5) fair, right   Bed Mobility   Bed Mobility Comments Patrick of legs to get into bed   Transfer Skills   Transfer Comments SBA for patient safety and to remind pt to breath as her 02 drops quickly if she does not   Gait   Gait Comments Pt ambulates with step-to gait pattern due to pain. Decreased marleny and step-length.   General Therapy Interventions   Planned Therapy Interventions gait training;ROM;strengthening;transfer training   Intervention Comments PT will work on strengthening R LE muscles, ROM exercises, and gait training.    Clinical Impression   Criteria for Skilled Therapeutic Intervention yes, treatment indicated   PT Diagnosis s/p R total knee arthroplasty   Influenced by the following impairments strength, ROM, edema, pain   Functional limitations due to impairments Bed mobility, transfers and ambulation   Clinical  "Presentation Stable/Uncomplicated   Clinical Presentation Rationale clinical judgement   Clinical Decision Making (Complexity) Low complexity   Therapy Frequency` 2 times/day   Predicted Duration of Therapy Intervention (days/wks) 2 days   Anticipated Equipment Needs at Discharge front wheeled walker   Anticipated Discharge Disposition Transitional Care Facility  (Pt would prefer TCU as she has no help at home)   Risk & Benefits of therapy have been explained Yes   Patient, Family & other staff in agreement with plan of care Yes   Clinical Impression Comments Pt would benefit from continued PT interventions to address limitations in strength, ROM and functional mobility.    [facility] AM-PAC  \"6 Clicks\" V.2 Basic Mobility Inpatient Short Form   1. Turning from your back to your side while in a flat bed without using bedrails? 4 - None   2. Moving from lying on your back to sitting on the side of a flat bed without using bedrails? 4 - None   3. Moving to and from a bed to a chair (including a wheelchair)? 4 - None   4. Standing up from a chair using your arms (e.g., wheelchair, or bedside chair)? 3 - A Little   5. To walk in hospital room? 3 - A Little   6. Climbing 3-5 steps with a railing? 3 - A Little   Basic Mobility Raw Score (Score out of 24.Lower scores equate to lower levels of function) 21   Total Evaluation Time   Total Evaluation Time (Minutes) 15   Celeste Kwon, PT  "

## 2018-07-24 NOTE — CONSULTS
Care Transition Initial Assessment - RN  Reason For Consult: discharge planning   Met with: Patient.    DATA   Principal Problem:    S/P total knee arthroplasty, right  Active Problems:    Major depression in partial remission (H)    Hyperlipidemia LDL goal <130    History of hepatitis B    Right knee DJD       Primary Care Clinic Name: [facility]  Primary Care MD Name: Astrid Marin MD  Contact information and PCP information verified: Yes    ASSESSMENT  Cognitive Status: awake, alert and oriented.  Resources List: Home Care, Skilled Nursing Facility   Lives With: spouse in [place] and in [place]  Living Arrangements: house (Here on Vacation, lives in [place])     Description of Support System: Supportive, Involved   Who is your support system?:    Support Assessment: Lacks adequate physical care (Souse in quadriplegic)   Insurance Concerns: No Insurance issues identified      This writer met with pt, introduced self and role.  Discussed discharge planning and Medicare guidelines in regards to home care, TCU and LTC. Patient stated that she is in [place] on vacation and primarily lives in [place] independently in the community. Patients goal is to return home but, is in agreement with going to a TCU for rehab or going home to her spouses home and receiving [facility] (phone: [phone] Fax: [phone]) -PT upon discharge. Patient was provided with Medicare certified nursing home list. Pts choices are as follows [facility] TCU Phone: (Admissions: [phone] RN Report: [phone] Fax: [phone]), [facility] (Admission phone: [phone] Main phone: [phone] Fax: [phone]) and [facility] Phone: ([phone]) Fax: ([phone]). Referrals pending. Pt was provided with Medicare certified home care list. Pt chooses to use [facility] (phone: [phone] Fax: [phone]) (CTS to call [facility] for availability and needs referral).  Patient stated that her family can provide transportation upon discharge.    Patients first choice for TCU has accepted the patient. [facility] Phone: (Admissions: [phone] RN Report: [phone] Fax: [phone]), CTS to Call [facility] with confirmation of need for TCU. so the admissions dept can get prior auth for pt TCU stay.    Patients second choice- [facility] (Admission phone: [phone] Main phone: [phone] Fax: [phone]) has a bed available for the patient when ready. Patients insurance does not require a 3 night stay. Patients insurance covers 85% of TCU stay, patient will be responsible for 15% of the TCU bill, and [facility] has a $20/day charge for a private room. Patient has 120 days of TCU coverage available.    PLAN    TCU VS HC      Discharge Planner   Discharge Plans in progress: TCU vs HC  Barriers to discharge plan: Medical stabilty  Follow up plan: CTS to follow       Entered by: Aishwarya Brian 07/24/2018 11:45 AM           Aishwarya Brian RN, Care Coordinator [phone]

## 2018-07-24 NOTE — PLAN OF CARE
Problem: Patient Care Overview  Goal: Plan of Care/Patient Progress Review  Outcome: Improving  VSS, a/ox3, hemovac 50 ml this am. Pivoting to commode, put some weight on r. Leg, has had CPM on all night. Refused to increase flexion. Pain and sleep have been main issue this shift. PRN medications administered as ordered see MAR. Ice applied and compression to unaffected leg. Had issues with very weak pulse and cool foot on right foot, ace wrap was too tight, rewound wrap and pulse and temp improved. Capnography on t/o night saturation in high 90's. I.S. And deep breathe/cough complete, tolerated well. Pt is getting very frustrated w/lack of sleep due to pain meds, commode, cms check etc, hopefully will be able to get some sleep on days or better clustering of cares tonight, otherwise a pleasant and cooperative pt. Will continue to monitor.

## 2018-07-24 NOTE — PLAN OF CARE
Problem: Patient Care Overview  Goal: Plan of Care/Patient Progress Review  Outcome: No Change  Patient tolerating regular diet and denied any nausea. Patient encouraged to sit in the chair for dinner and to do another short walk before bed.     Problem: Knee Arthroplasty (Total, Partial) (Adult)  Goal: Signs and Symptoms of Listed Potential Problems Will be Absent, Minimized or Managed (Knee Arthroplasty)  Signs and symptoms of listed potential problems will be absent, minimized or managed by discharge/transition of care (reference Knee Arthroplasty (Total, Partial) (Adult) CPG).   Outcome: No Change  POD #1. Right knee is covered with ace wrap that is clean, dry, and intact. CMS intact, but weak pulse. Denied any numbness or tingling. Patient up with assist of one with walker. Vital signs stable. Patient reported increase in pain today. Scheduled tylenol and PRN oral oxycodone given.   Goal: Anesthesia/Sedation Recovery  Outcome: Completed Date Met: 07/24/18  Patient alert and orientated.

## 2018-07-24 NOTE — PROGRESS NOTES
[facility]    Hospital Medicine Progress Note  Date of Service: 07/24/2018    Assessment & Plan   Preeti Sherman is a 62 year old female who presented on 7/23/2018 for scheduled Procedure(s):  ARTHROPLASTY KNEE by Ugo Brady MD and is being followed by the hospital medicine service for co-management of acute and/or chronic perioperative medical problems.      Right knee DJD  S/p Procedure(s):  ARTHROPLASTY KNEE / S/P total knee arthroplasty, right - 7/23/18  1 Day Post-Op    - pain control, wound cares, physical therapy, occupational therapy and DVT prophylaxis per orthopedic surgery service      Major depression in partial remission (H)  Stable. Taking Celexa prior to admission, continue.    Hyperlipidemia LDL goal <130  Per chart review. Not currently on any medications for this. Continue with outpatient management.      History of hepatitis B  Per chart review. Follows at [facility], records not available.      DVT Prophylaxis: as per orthopedic surgery service - Enoxaparin (Lovenox) SQ and Pneumatic Compression Devices  Code Status: Full Code    Lines: Peripheral   Morfin catheter: Not indicated    Discussion: Medically, the patient appears stable and well    Disposition: Anticipate discharge 1-2 days, likely to TCU     Attestation:  I have reviewed today's vital signs, notes, medications, labs and imaging.    Yarelis Reed PA-C  [facility] Hospitalist Service  Pager: [phone]       Interval History   Patient feeling tired after surgery and not getting much sleep overnight. Otherwise doing well. Feels pain is adequately controlled now (was worse overnight).     Tolerating oral intake, no abdominal pain, nausea, or vomiting. No bowel movement since surgery. Voiding well, although notes she needs to bear down slightly to initiate urine stream.    Denies pain other than knee/leg. Denies numbness/tingling.    Denies chest pain, palpitations, cough, wheeze, shortness of  breath, dizziness, lightheadedness, headache.      Physical Exam   Temp:  [98  F (36.7  C)-99  F (37.2  C)] 98.2  F (36.8  C)  Pulse:  [60-76] 74  Resp:  [16-18] 18  BP: ()/(54-85) 112/75  SpO2:  [94 %-99 %] 96 %    Weights:   Vitals:    07/23/18 1015   Weight: 59.9 kg (132 lb)    Body mass index is 25.35 kg/(m^2).    General appearance: Awake, alert, and in no apparent distress. Pleasant and conversational, speaking in full sentences.  HEENT: Moist mucus membranes, no exudate. No scleral icterus.  CV: Regular rhythm & rate, no murmurs. No edema. Peripheral pulses intact.  Respiratory: Moving air well bilaterally, no wheezing, crackles, or rhonchi.  GI: Non-distended, soft, nontender to palpation. No rebound or guarding. Normoactive bowel sounds.  Skin: Warm, dry, no rashes or ecchymoses. No mottling of skin. Right knee with ACE bandage in place, clean and dry. Hemovac drain in place.  Musculoskeletal / extremities: Moves all extremities equally, no obvious abnormalities.  Neurologic: No focal deficits.      Data     Recent Labs  Lab 07/24/18  0556 07/20/18  1320   WBC  --  9.1   HGB 10.9* 13.5   MCV  --  93    323   CR 0.72  --        No results for input(s): GLC, BGM in the last 168 hours.     Unresulted Labs Ordered in the Past 30 Days of this Admission     No orders found for last 61 day(s).           Imaging  No results found for this or any previous visit (from the past 24 hour(s)).     I reviewed all new labs and imaging results over the last 24 hours. I personally reviewed no images or EKG's today.    Medications     dextrose 5% and 0.45% NaCl + KCl 20 mEq/L 75 mL/hr at 07/24/18 0659       acetaminophen  975 mg Oral Q8H     citalopram  20 mg Oral QAM     enoxaparin  40 mg Subcutaneous Q24H     gabapentin  300 mg Oral BID     senna-docusate  1 tablet Oral BID    Or     senna-docusate  2 tablet Oral BID     sodium chloride (PF)  3 mL Intracatheter Q8H       Yarelis Reed PA-C  [facility]  Hospitalist Service  Pager: [phone]

## 2018-07-24 NOTE — PLAN OF CARE
Problem: Patient Care Overview  Goal: Plan of Care/Patient Progress Review  Outcome: No Change  Patient is alert and oriented, reports being tired today. Pain medications helpful and ice to incision. Dressing changed, no drainage. Weak left pedal pulse. No numbness or tingling. Moves well in room with minimal assist of 1 and walker.   Reports voiding without difficulty. IV saline locked.

## 2018-07-24 NOTE — PROGRESS NOTES
Discharge Planner PT   Patient plan for discharge: TCU or home with home care. Pt would prefer a TCU.  Current status: Patrick-SBA for bed mobility, SBA for transfers and ambulation. Educated pt on post-op knee exercise packet.  Barriers to return to prior living situation: no assistance at home. Must be able to negotiate 10 stairs with one railing.   Recommendations for discharge: Recommend TCU or home with home care.   Rationale for recommendations: Pt would benefit from continued PT services to address limitations in ROM, strength, and functional mobility.        Entered by: Celeste Kwon 07/24/2018 3:05 PM

## 2018-07-24 NOTE — PROGRESS NOTES
"[facility] Orthopaedics Progress Note      Post-operative Day: 1 Day Post-Op    Procedure(s):  Right Total Knee Arthroplasty - Wound Class: I-Clean      Subjective:    Pain: moderate  aching to R knee. Denies shooting pain, parasthesias, numbness and weakness.   denies Chest pain, SOB, O2 required: None  denies nausea/ emesis  denies lightheadedness, dizziness and weakness  BM -, passing gas +. Urinating well, Morfin in place: no.       Objective:  Blood pressure 112/75, pulse 74, temperature 98.2  F (36.8  C), temperature source Oral, resp. rate 18, height 1.537 m (5' 0.51\"), weight 59.9 kg (132 lb), SpO2 96 %.    Patient Vitals for the past 24 hrs:   BP Temp Temp src Pulse Resp SpO2   07/24/18 0724 112/75 98.2  F (36.8  C) Oral 74 18 96 %   07/24/18 0632 151/60 - - - - -   07/24/18 0345 104/58 98.5  F (36.9  C) Oral 76 16 97 %   07/23/18 2352 116/61 98  F (36.7  C) Oral 72 18 94 %   07/23/18 2000 - 98.8  F (37.1  C) Oral - - -   07/23/18 1935 109/65 - - 67 16 95 %   07/23/18 1719 135/70 - - 70 18 97 %   07/23/18 1600 124/74 - - 69 18 95 %   07/23/18 1545 - - - - - 98 %   07/23/18 1530 123/75 - - 62 18 99 %   07/23/18 1515 - - - - - 95 %   07/23/18 1500 125/54 - - 62 18 98 %   07/23/18 1445 - - - - - 97 %   07/23/18 1435 118/85 - - 64 18 98 %   07/23/18 1355 113/66 - - 66 16 95 %   07/23/18 1341 101/63 - - 60 16 96 %       Wt Readings from Last 4 Encounters:   07/23/18 59.9 kg (132 lb)   07/20/18 59.9 kg (132 lb)   07/15/15 58.1 kg (128 lb)   07/07/15 58.2 kg (128 lb 3.2 oz)     General: Alert and orientated. No apparent distress. Non-labored breathing. Appropriate affect.   MSK: R knee: dressing Clean, dry, and intact. Skin intact, no ecchymosis, no erythema. nontender to palpation to incision site. ROM appropriate for post op. Distal neurovascularly intact. Compartments soft and non-tender. No calf pain. Homans negative. PF/DF and EHL intact.       Pertinent Labs   Lab Results: personally reviewed.     Recent " Labs   Lab Test  07/24/18   0556  07/20/18   1320  07/15/15   1532  03/07/12   1136  06/30/11   1131   HGB  10.9*  13.5  14.2  14.1  13.8   HCT   --   40.7  42.8  42.5  41.8   MCV   --   93  93  89  91   PLT  253  323  311  311  298   NA   --    --   138  139  141         Procedure(s):  Right Total Knee Arthroplasty - Wound Class: I-Clean  Plan: Anticoagulation protocol: Lovenox inpatient and then  mg daily at discharge  x 42  days            Pain medications:  oxycodone and tylenol            Weight bearing status:  WBAT            Dressing Change:  sterile dry dressing change with gauze and ACE. Aliao to remain intact until follow up.            Disposition:  TCU in 2 days             Follow up: 2 week with SHANE            Continue cares and rehabilitation     Report completed by:  Mary Cannon PA-C  Date: 7/24/2018  Time: 1:24 PM

## 2018-07-24 NOTE — PROGRESS NOTES
" 07/24/18 1000   Quick Adds   Type of Visit Initial Occupational Therapy Evaluation   Living Environment   Lives With spouse   Living Arrangements house   Number of Stairs Within Home 10   Stair Railings at Home inside, present on left side   Living Environment Comment Lives in [place], but has a home in [place] where  lives.  is a quadriplegic who has care 24/7. has roll in shower with shower chair and grab bars   Self-Care   Dominant Hand right   Usual Activity Tolerance good   Current Activity Tolerance moderate   Regular Exercise yes   Activity/Exercise Type walking   Exercise Amount/Frequency 5-7 times/wk   Equipment Currently Used at Home none  (has shower chair, grab bars)   Activity/Exercise/Self-Care Comment active prior to surgery.    Functional Level Prior   Ambulation 0-->independent   Transferring 0-->independent   Toileting 0-->independent   Bathing 0-->independent   Dressing 0-->independent   Eating 0-->independent   Communication 0-->understands/communicates without difficulty   Swallowing 0-->swallows foods/liquids without difficulty   Cognition 0 - no cognition issues reported   Fall history within last six months yes   Number of times patient has fallen within last six months 1   Which of the above functional risks had a recent onset or change? ambulation;transferring   General Information   Onset of Illness/Injury or Date of Surgery - Date 07/23/18   Referring Physician Ugo Brady MD   Patient/Family Goals Statement To go to TCU prior to return home. Pt is concerned as she does not have assist upon discharge. Would need to be able to complete IADLs.    Additional Occupational Profile Info/Pertinent History of Current Problem s/p R TKA   Weight-Bearing Status - RLE weight-bearing as tolerated   Cognitive Status Examination   Orientation orientation to person, place and time   Pain Assessment   Patient Currently in Pain Yes, see Vital Sign flowsheet  (\"7/10\")   Transfer Skill: " Bed to Chair/Chair to Bed   Level of [place]: Bed to Chair stand-by assist   Physical Assist/Nonphysical Assist: Bed to Chair 1 person assist   Weight-Bearing Restrictions weight-bearing as tolerated   Assistive Device - Transfer Skill Bed to Chair Chair to Bed Rehab Eval standard walker   Transfer Skill: Sit to Stand   Level of [place]: Sit/Stand independent   Physical Assist/Nonphysical Assist: Sit/Stand supervision   Transfer Skill: Sit to Stand weight-bearing as tolerated   Assistive Device for Transfer: Sit/Stand standard walker   Transfer Skill: Toilet Transfer   Level of [place]: Toilet independent   Physical Assist/Nonphysical Assist: Toilet supervision   Weight-Bearing Restrictions: Toilet weight-bearing as tolerated   Assistive Device rolling walker;grab bars   Upper Body Dressing   Level of [place]: Dress Upper Body independent   Lower Body Dressing   Level of [place]: Dress Lower Body independent  (B socks)   Instrumental Activities of Daily Living (IADL)   Previous Responsibilities meal prep;housekeeping;laundry   Activities of Daily Living Analysis   Impairments Contributing to Impaired Activities of Daily Living post surgical precautions   General Therapy Interventions   Planned Therapy Interventions ADL retraining   Clinical Impression   Criteria for Skilled Therapeutic Interventions Met yes, treatment indicated   OT Diagnosis decreased independence with ADLs   Influenced by the following impairments decreased ROM in knee, increased pain   Assessment of Occupational Performance 1-3 Performance Deficits   Identified Performance Deficits LB dressing, toilet transfer   Clinical Decision Making (Complexity) Low complexity   Therapy Frequency daily   Predicted Duration of Therapy Intervention (days/wks) 1x treat   Anticipated Discharge Disposition Home with Home Therapy;Transitional Care Facility   Risks and Benefits of Treatment have been explained. Yes   Patient, Family & other  "staff in agreement with plan of care Yes   [facility] AM-PAC  \"6 Clicks\" Daily Activity Inpatient Short Form   1. Putting on and taking off regular lower body clothing? 4 - None   2. Bathing (including washing, rinsing, drying)? 3 - A Little   3. Toileting, which includes using toilet, bedpan or urinal? 4 - None   4. Putting on and taking off regular upper body clothing? 4 - None   5. Taking care of personal grooming such as brushing teeth? 4 - None   6. Eating meals? 4 - None   Daily Activity Raw Score (Score out of 24.Lower scores equate to lower levels of function) 23   Total Evaluation Time   Total Evaluation Time (Minutes) 8     "

## 2018-07-24 NOTE — PROGRESS NOTES
Discharge Planner PT   Patient plan for discharge: Pt to be discharged to TCU.  Current status: SBA - Patrick for bed mobility, assist with legs getting into bed. SBA for transfers. CGA for ambulation with FWW. Pt exhibits good safety awareness.  Barriers to return to prior living situation: no assistance at home. Stairs within the home.   Recommendations for discharge: Recommend discharge to TCU or home with home care, pending pts decision.  Rationale for recommendations: Pt would benefit from continued PT services to improve strength, ROM and overall functional mobility.        Entered by: Celeste Kwon 07/24/2018 9:54 AM

## 2018-07-25 ENCOUNTER — APPOINTMENT (OUTPATIENT)
Dept: PHYSICAL THERAPY | Facility: CLINIC | Age: 63
DRG: 470 | End: 2018-07-25
Attending: ORTHOPAEDIC SURGERY
Payer: COMMERCIAL

## 2018-07-25 LAB
GLUCOSE SERPL-MCNC: 96 MG/DL (ref 70–99)
HGB BLD-MCNC: 9.6 G/DL (ref 11.7–15.7)

## 2018-07-25 PROCEDURE — 36415 COLL VENOUS BLD VENIPUNCTURE: CPT | Performed by: ORTHOPAEDIC SURGERY

## 2018-07-25 PROCEDURE — 82947 ASSAY GLUCOSE BLOOD QUANT: CPT | Performed by: ORTHOPAEDIC SURGERY

## 2018-07-25 PROCEDURE — 97116 GAIT TRAINING THERAPY: CPT | Mod: GP | Performed by: PHYSICAL THERAPIST

## 2018-07-25 PROCEDURE — 85018 HEMOGLOBIN: CPT | Performed by: ORTHOPAEDIC SURGERY

## 2018-07-25 PROCEDURE — 40000193 ZZH STATISTIC PT WARD VISIT: Performed by: PHYSICAL THERAPIST

## 2018-07-25 PROCEDURE — 25000132 ZZH RX MED GY IP 250 OP 250 PS 637: Performed by: ORTHOPAEDIC SURGERY

## 2018-07-25 PROCEDURE — 25000132 ZZH RX MED GY IP 250 OP 250 PS 637: Performed by: PHYSICIAN ASSISTANT

## 2018-07-25 PROCEDURE — 99232 SBSQ HOSP IP/OBS MODERATE 35: CPT | Performed by: PHYSICIAN ASSISTANT

## 2018-07-25 PROCEDURE — 25000128 H RX IP 250 OP 636: Performed by: ORTHOPAEDIC SURGERY

## 2018-07-25 PROCEDURE — 12000000 ZZH R&B MED SURG/OB

## 2018-07-25 PROCEDURE — 97110 THERAPEUTIC EXERCISES: CPT | Mod: GP | Performed by: PHYSICAL THERAPIST

## 2018-07-25 RX ADMIN — ENOXAPARIN SODIUM 40 MG: 40 INJECTION SUBCUTANEOUS at 11:36

## 2018-07-25 RX ADMIN — OXYCODONE HYDROCHLORIDE 10 MG: 5 TABLET ORAL at 17:25

## 2018-07-25 RX ADMIN — HYDROXYZINE HYDROCHLORIDE 25 MG: 25 TABLET ORAL at 02:41

## 2018-07-25 RX ADMIN — CITALOPRAM HYDROBROMIDE 20 MG: 20 TABLET ORAL at 07:49

## 2018-07-25 RX ADMIN — GABAPENTIN 300 MG: 300 CAPSULE ORAL at 07:49

## 2018-07-25 RX ADMIN — ACETAMINOPHEN 975 MG: 325 TABLET ORAL at 07:49

## 2018-07-25 RX ADMIN — SENNOSIDES AND DOCUSATE SODIUM 1 TABLET: 8.6; 5 TABLET ORAL at 19:14

## 2018-07-25 RX ADMIN — OXYCODONE HYDROCHLORIDE 10 MG: 5 TABLET ORAL at 02:42

## 2018-07-25 RX ADMIN — OXYCODONE HYDROCHLORIDE 10 MG: 5 TABLET ORAL at 20:30

## 2018-07-25 RX ADMIN — OXYCODONE HYDROCHLORIDE 10 MG: 5 TABLET ORAL at 14:37

## 2018-07-25 RX ADMIN — HYDROXYZINE HYDROCHLORIDE 25 MG: 25 TABLET ORAL at 14:37

## 2018-07-25 RX ADMIN — GABAPENTIN 300 MG: 300 CAPSULE ORAL at 19:14

## 2018-07-25 RX ADMIN — SENNOSIDES AND DOCUSATE SODIUM 1 TABLET: 8.6; 5 TABLET ORAL at 07:49

## 2018-07-25 RX ADMIN — ACETAMINOPHEN 975 MG: 325 TABLET ORAL at 15:34

## 2018-07-25 RX ADMIN — HYDROXYZINE HYDROCHLORIDE 25 MG: 25 TABLET ORAL at 20:30

## 2018-07-25 RX ADMIN — OXYCODONE HYDROCHLORIDE 10 MG: 5 TABLET ORAL at 11:35

## 2018-07-25 RX ADMIN — OXYCODONE HYDROCHLORIDE 10 MG: 5 TABLET ORAL at 05:40

## 2018-07-25 RX ADMIN — OXYCODONE HYDROCHLORIDE 10 MG: 5 TABLET ORAL at 08:22

## 2018-07-25 RX ADMIN — HYDROXYZINE HYDROCHLORIDE 25 MG: 25 TABLET ORAL at 08:22

## 2018-07-25 ASSESSMENT — ACTIVITIES OF DAILY LIVING (ADL)
ADLS_ACUITY_SCORE: 10

## 2018-07-25 NOTE — UTILIZATION REVIEW
PAS Completed information has been submitted on July 25th, 2018 at 03:22:17 PM CDT. The confirmation number is [id number]

## 2018-07-25 NOTE — PLAN OF CARE
Problem: Knee Arthroplasty (Total, Partial) (Adult)  Goal: Signs and Symptoms of Listed Potential Problems Will be Absent, Minimized or Managed (Knee Arthroplasty)  Signs and symptoms of listed potential problems will be absent, minimized or managed by discharge/transition of care (reference Knee Arthroplasty (Total, Partial) (Adult) CPG).   Outcome: Improving  Pt has reported bad pain tonight. Has been tearful at times when ambulating to bathroom w/ SBA & walker. At beginning of shift was refusing to use CPM, but after some encouragement she agreed to have it placed. Pt states the CPM has helped a bit with the joint stiffness. She describes a burning pain in her knee and muscle soreness in R thigh. With the combined use of oxycodone 10mg every 3 hrs, vistaril 25mg every 6, ice packs, distraction and massage; pt has reported better control of pain.

## 2018-07-25 NOTE — PROGRESS NOTES
[facility]    Hospital Medicine Progress Note  Date of Service: 07/25/2018    Assessment & Plan   Preeti Sherman is a 62 year old female who presented on 7/23/2018 for scheduled Procedure(s):  ARTHROPLASTY KNEE by Ugo Brady MD and is being followed by the hospital medicine service for co-management of acute and/or chronic perioperative medical problems.      Right knee DJD  S/p Procedure(s):  ARTHROPLASTY KNEE / S/P total knee arthroplasty, right - 7/23/18  2 Days Post-Op    - pain control, wound cares, physical therapy, occupational therapy and DVT prophylaxis per orthopedic surgery service      Major depression in partial remission (H)  Stable. Taking Celexa prior to admission, continue.    Hyperlipidemia LDL goal <130  Per chart review. Not currently on any medications for this. Continue with outpatient management.      History of hepatitis B  Per chart review. Follows at [facility], records not available.      DVT Prophylaxis: as per orthopedic surgery service - Enoxaparin (Lovenox) SQ and Pneumatic Compression Devices  Code Status: Full Code    Lines: Peripheral   Morfin catheter: Not indicated    Discussion: Medically, the patient appears stable and well    Disposition: Anticipate discharge today or tomorrow, likely to TCU. No barriers to discharge from internal medicine standpoint.     Attestation:  I have reviewed today's vital signs, notes, medications, labs and imaging.    Yarelis Reed PA-C  [facility] Hospitalist Service  Pager: [phone]       Interval History   Pain worse overnight and today compared to yesterday. Rates pain 8/10. Worse after therapies.    Otherwise feeling well. Tolerating oral intake, no abdominal pain, nausea, vomiting. Voiding well without complications or complaints. No bowel movement yet but passing flatus.    No new pain, numbness, or tingling.     Denies chest pain, palpitations, breathing difficulties, dizziness,  headache.      Physical Exam   Temp:  [97.8  F (36.6  C)-98.6  F (37  C)] 97.8  F (36.6  C)  Pulse:  [63-72] 72  Resp:  [16-18] 16  BP: (117-176)/(57-70) 121/57  SpO2:  [94 %-97 %] 94 %    Weights:   Vitals:    07/23/18 1015   Weight: 59.9 kg (132 lb)    Body mass index is 25.35 kg/(m^2).    General appearance: Awake, alert, and in no apparent distress. Pleasant and conversational, speaking in full sentences.  HEENT: Moist mucus membranes, no exudate. No scleral icterus.  CV: Regular rhythm & rate, no murmurs. No edema. Peripheral pulses intact.  Respiratory: Moving air well bilaterally, no wheezing, crackles, or rhonchi.  GI: Non-distended, soft, nontender to palpation. No rebound or guarding. Normoactive bowel sounds.  Skin: Warm, dry, no rashes or ecchymoses. No mottling of skin. Right knee with ACE bandage in place, clean and dry.  Musculoskeletal / extremities: Moves all extremities equally, no obvious abnormalities.  Neurologic: No focal deficits.      Data     Recent Labs  Lab 07/25/18  0530 07/24/18  0556 07/20/18  1320   WBC  --   --  9.1   HGB 9.6* 10.9* 13.5   MCV  --   --  93   PLT  --  253 323   CR  --  0.72  --    GLC 96  --   --          Recent Labs  Lab 07/25/18  0530   GLC 96        Unresulted Labs Ordered in the Past 30 Days of this Admission     No orders found from 5/24/2018 to 7/24/2018.           Imaging  No results found for this or any previous visit (from the past 24 hour(s)).     I reviewed all new labs and imaging results over the last 24 hours. I personally reviewed no images or EKG's today.    Medications       acetaminophen  975 mg Oral Q8H     citalopram  20 mg Oral QAM     enoxaparin  40 mg Subcutaneous Q24H     gabapentin  300 mg Oral BID     senna-docusate  1 tablet Oral BID    Or     senna-docusate  2 tablet Oral BID     sodium chloride (PF)  3 mL Intracatheter Q8H       Yarelis Reed PA-C  [facility] Service  Pager: [phone]

## 2018-07-25 NOTE — PLAN OF CARE
Problem: Patient Care Overview  Goal: Plan of Care/Patient Progress Review  Outcome: No Change  Continues to report a lot of pain today, using oxycodone 10mg every 3 hours with atarax every 6.  Ice to knee.  Moving ok with walker and SBA, only painful.  Planning discharge to TCU.

## 2018-07-25 NOTE — PLAN OF CARE
Problem: Patient Care Overview  Goal: Plan of Care/Patient Progress Review  Discharge Planner PT   Patient plan for discharge: TCU    Current status: Pt reports increased pain today. -  Ambulated 130 feet x1 with RW, SBA. Slow rate, had 2 minor LOB w/ self correction due to R knee instability from pain/ weakness; Min. Assistance w/ sitting  <>  supine  For LE tracking    Barriers to return to prior living situation: Lives alone. Must be able to amb 10 steps within her home    Recommendations for discharge: TCU     Rationale for recommendations: Pt would benefit from continued PT  to address limitations in ROM, strength, and functional mobility       Entered by: Cherry Gaona 07/25/2018 9:29 AM

## 2018-07-25 NOTE — PROGRESS NOTES
Care Coordinator Progress Note    Admission Date/Time:  7/23/2018  Attending MD:  Ugo Brady MD    Data  Chart reviewed, discussed with interdisciplinary team.   Patient was admitted for: Data Unavailable.    Coordination of Care and Referrals: Provided patient/family with options for Skilled Nursing Facility, TCU.        Assessment  Patient was accepted at [facility] Phone: (Admissions: [phone] RN Report: [phone] Fax: [phone]) for admission 7/26/2018.     Plan  Anticipated Discharge Date:  7/26/2018  Anticipated Discharge Plan:  [facility] Phone: (Admissions: [phone] RN Report: [phone] Fax: [phone])     Erika Adhikari, MSN, RN, Care Coordinator  [facility] [phone]  [facility] [phone]

## 2018-07-25 NOTE — PROGRESS NOTES
"[facility] Orthopaedics Progress Note      Post-operative Day: 2 Days Post-Op    Procedure(s):  Right Total Knee Arthroplasty - Wound Class: I-Clean      Subjective:    Pain: moderate  aching to R knee. Denies shooting pain, parasthesias, numbness and weakness.   denies Chest pain, SOB, O2 required: None  denies nausea/ emesis  denies lightheadedness, dizziness and weakness  BM -, passing gas +. Urinating well, Morfin in place: no.       Objective:  Blood pressure 121/57, pulse 72, temperature 97.8  F (36.6  C), temperature source Oral, resp. rate 16, height 1.537 m (5' 0.51\"), weight 59.9 kg (132 lb), SpO2 94 %.    Patient Vitals for the past 24 hrs:   BP Temp Temp src Pulse Resp SpO2   07/25/18 0720 121/57 97.8  F (36.6  C) Oral 72 16 94 %   07/24/18 2359 176/70 98.6  F (37  C) Oral 63 18 97 %   07/24/18 1458 117/61 98.4  F (36.9  C) Oral 67 18 94 %       Wt Readings from Last 4 Encounters:   07/23/18 59.9 kg (132 lb)   07/20/18 59.9 kg (132 lb)   07/15/15 58.1 kg (128 lb)   07/07/15 58.2 kg (128 lb 3.2 oz)     General: Alert and orientated. No apparent distress. Non-labored breathing. Appropriate affect.   MSK: R knee: dressing Clean, dry, and intact. Skin intact, no ecchymosis, no erythema. nontender to palpation to incision site. ROM appropriate for post op. Distal neurovascularly intact. Compartments soft and non-tender. No calf pain. Homans negative. PF/DF and EHL intact.       Pertinent Labs   Lab Results: personally reviewed.     Recent Labs   Lab Test  07/25/18   0530  07/24/18   0556  07/20/18   1320  07/15/15   1532  03/07/12   1136  06/30/11   1131   HGB  9.6*  10.9*  13.5  14.2  14.1  13.8   HCT   --    --   40.7  42.8  42.5  41.8   MCV   --    --   93  93  89  91   PLT   --   253  323  311  311  298   NA   --    --    --   138  139  141         Procedure(s):  Right Total Knee Arthroplasty - Wound Class: I-Clean  Plan: Anticoagulation protocol: Lovenox inpatient and then  mg daily at discharge  " x 42  days            Pain medications:  oxycodone and tylenol            Weight bearing status:  WBAT            Dressing Change:  sterile dry dressing change with gauze and ACE. Aliao to remain intact until follow up.             Disposition: TCU tomorrow             Follow up: 2 week with SHANE            Continue cares and rehabilitation     Report completed by:  Mary Cannon PA-C  Date: 7/25/2018  Time: 11:08 AM

## 2018-07-26 ENCOUNTER — APPOINTMENT (OUTPATIENT)
Dept: PHYSICAL THERAPY | Facility: CLINIC | Age: 63
DRG: 470 | End: 2018-07-26
Attending: ORTHOPAEDIC SURGERY
Payer: COMMERCIAL

## 2018-07-26 ENCOUNTER — HOSPITAL ENCOUNTER (EMERGENCY)
Facility: CLINIC | Age: 63
End: 2018-07-26
Payer: COMMERCIAL

## 2018-07-26 ENCOUNTER — APPOINTMENT (OUTPATIENT)
Dept: GENERAL RADIOLOGY | Facility: CLINIC | Age: 63
End: 2018-07-26
Attending: STUDENT IN AN ORGANIZED HEALTH CARE EDUCATION/TRAINING PROGRAM
Payer: COMMERCIAL

## 2018-07-26 ENCOUNTER — HOSPITAL ENCOUNTER (OUTPATIENT)
Facility: CLINIC | Age: 63
Setting detail: OBSERVATION
Discharge: NURSING, REHAB OR LONG-TERM CARE | End: 2018-07-28
Attending: STUDENT IN AN ORGANIZED HEALTH CARE EDUCATION/TRAINING PROGRAM | Admitting: INTERNAL MEDICINE
Payer: COMMERCIAL

## 2018-07-26 VITALS
HEART RATE: 71 BPM | OXYGEN SATURATION: 98 % | RESPIRATION RATE: 18 BRPM | DIASTOLIC BLOOD PRESSURE: 67 MMHG | HEIGHT: 61 IN | SYSTOLIC BLOOD PRESSURE: 132 MMHG | TEMPERATURE: 98.8 F | WEIGHT: 132 LBS | BODY MASS INDEX: 24.92 KG/M2

## 2018-07-26 DIAGNOSIS — G89.18 POSTOPERATIVE PAIN OF RIGHT KNEE: ICD-10-CM

## 2018-07-26 DIAGNOSIS — R50.82 POSTOPERATIVE FEVER: ICD-10-CM

## 2018-07-26 DIAGNOSIS — M25.561 POSTOPERATIVE PAIN OF RIGHT KNEE: ICD-10-CM

## 2018-07-26 LAB
ALBUMIN SERPL-MCNC: 3.3 G/DL (ref 3.4–5)
ALP SERPL-CCNC: 71 U/L (ref 40–150)
ALT SERPL W P-5'-P-CCNC: 26 U/L (ref 0–50)
ANION GAP SERPL CALCULATED.3IONS-SCNC: 4 MMOL/L (ref 3–14)
AST SERPL W P-5'-P-CCNC: 21 U/L (ref 0–45)
BASE EXCESS BLDV CALC-SCNC: 7 MMOL/L
BASOPHILS # BLD AUTO: 0.1 10E9/L (ref 0–0.2)
BASOPHILS NFR BLD AUTO: 0.5 %
BILIRUB SERPL-MCNC: 0.5 MG/DL (ref 0.2–1.3)
BUN SERPL-MCNC: 10 MG/DL (ref 7–30)
CALCIUM SERPL-MCNC: 9.2 MG/DL (ref 8.5–10.1)
CHLORIDE SERPL-SCNC: 100 MMOL/L (ref 94–109)
CO2 SERPL-SCNC: 30 MMOL/L (ref 20–32)
CREAT SERPL-MCNC: 0.81 MG/DL (ref 0.52–1.04)
CRP SERPL-MCNC: 150 MG/L (ref 0–8)
DIFFERENTIAL METHOD BLD: ABNORMAL
EOSINOPHIL # BLD AUTO: 0.3 10E9/L (ref 0–0.7)
EOSINOPHIL NFR BLD AUTO: 2.8 %
ERYTHROCYTE [DISTWIDTH] IN BLOOD BY AUTOMATED COUNT: 13.7 % (ref 10–15)
ERYTHROCYTE [SEDIMENTATION RATE] IN BLOOD BY WESTERGREN METHOD: 37 MM/H (ref 0–30)
GFR SERPL CREATININE-BSD FRML MDRD: 71 ML/MIN/1.7M2
GLUCOSE SERPL-MCNC: 101 MG/DL (ref 70–99)
HCO3 BLDV-SCNC: 31 MMOL/L (ref 21–28)
HCT VFR BLD AUTO: 34.3 % (ref 35–47)
HGB BLD-MCNC: 11.1 G/DL (ref 11.7–15.7)
IMM GRANULOCYTES # BLD: 0 10E9/L (ref 0–0.4)
IMM GRANULOCYTES NFR BLD: 0.3 %
INR PPP: 0.9 (ref 0.86–1.14)
LACTATE BLD-SCNC: 1 MMOL/L (ref 0.7–2)
LYMPHOCYTES # BLD AUTO: 2.3 10E9/L (ref 0.8–5.3)
LYMPHOCYTES NFR BLD AUTO: 24.7 %
MCH RBC QN AUTO: 30.3 PG (ref 26.5–33)
MCHC RBC AUTO-ENTMCNC: 32.4 G/DL (ref 31.5–36.5)
MCV RBC AUTO: 94 FL (ref 78–100)
MONOCYTES # BLD AUTO: 0.8 10E9/L (ref 0–1.3)
MONOCYTES NFR BLD AUTO: 9.1 %
NEUTROPHILS # BLD AUTO: 5.8 10E9/L (ref 1.6–8.3)
NEUTROPHILS NFR BLD AUTO: 62.6 %
NRBC # BLD AUTO: 0 10*3/UL
NRBC BLD AUTO-RTO: 0 /100
PCO2 BLDV: 42 MM HG (ref 40–50)
PH BLDV: 7.48 PH (ref 7.32–7.43)
PLATELET # BLD AUTO: 223 10E9/L (ref 150–450)
PLATELET # BLD AUTO: 262 10E9/L (ref 150–450)
PO2 BLDV: 36 MM HG (ref 25–47)
POTASSIUM SERPL-SCNC: 3.9 MMOL/L (ref 3.4–5.3)
PROT SERPL-MCNC: 7.1 G/DL (ref 6.8–8.8)
RBC # BLD AUTO: 3.66 10E12/L (ref 3.8–5.2)
SODIUM SERPL-SCNC: 134 MMOL/L (ref 133–144)
WBC # BLD AUTO: 9.2 10E9/L (ref 4–11)

## 2018-07-26 PROCEDURE — 25000132 ZZH RX MED GY IP 250 OP 250 PS 637: Performed by: PHYSICIAN ASSISTANT

## 2018-07-26 PROCEDURE — 99231 SBSQ HOSP IP/OBS SF/LOW 25: CPT | Performed by: PHYSICIAN ASSISTANT

## 2018-07-26 PROCEDURE — 96376 TX/PRO/DX INJ SAME DRUG ADON: CPT

## 2018-07-26 PROCEDURE — 85610 PROTHROMBIN TIME: CPT | Performed by: STUDENT IN AN ORGANIZED HEALTH CARE EDUCATION/TRAINING PROGRAM

## 2018-07-26 PROCEDURE — 96374 THER/PROPH/DIAG INJ IV PUSH: CPT

## 2018-07-26 PROCEDURE — 87086 URINE CULTURE/COLONY COUNT: CPT | Performed by: STUDENT IN AN ORGANIZED HEALTH CARE EDUCATION/TRAINING PROGRAM

## 2018-07-26 PROCEDURE — 40000193 ZZH STATISTIC PT WARD VISIT

## 2018-07-26 PROCEDURE — 87040 BLOOD CULTURE FOR BACTERIA: CPT | Performed by: STUDENT IN AN ORGANIZED HEALTH CARE EDUCATION/TRAINING PROGRAM

## 2018-07-26 PROCEDURE — 82803 BLOOD GASES ANY COMBINATION: CPT | Performed by: STUDENT IN AN ORGANIZED HEALTH CARE EDUCATION/TRAINING PROGRAM

## 2018-07-26 PROCEDURE — 36415 COLL VENOUS BLD VENIPUNCTURE: CPT | Performed by: ORTHOPAEDIC SURGERY

## 2018-07-26 PROCEDURE — 25000128 H RX IP 250 OP 636: Performed by: STUDENT IN AN ORGANIZED HEALTH CARE EDUCATION/TRAINING PROGRAM

## 2018-07-26 PROCEDURE — 86140 C-REACTIVE PROTEIN: CPT | Performed by: STUDENT IN AN ORGANIZED HEALTH CARE EDUCATION/TRAINING PROGRAM

## 2018-07-26 PROCEDURE — 85652 RBC SED RATE AUTOMATED: CPT | Performed by: STUDENT IN AN ORGANIZED HEALTH CARE EDUCATION/TRAINING PROGRAM

## 2018-07-26 PROCEDURE — 96375 TX/PRO/DX INJ NEW DRUG ADDON: CPT

## 2018-07-26 PROCEDURE — 85025 COMPLETE CBC W/AUTO DIFF WBC: CPT | Performed by: STUDENT IN AN ORGANIZED HEALTH CARE EDUCATION/TRAINING PROGRAM

## 2018-07-26 PROCEDURE — 25000128 H RX IP 250 OP 636: Performed by: ORTHOPAEDIC SURGERY

## 2018-07-26 PROCEDURE — 85049 AUTOMATED PLATELET COUNT: CPT | Performed by: ORTHOPAEDIC SURGERY

## 2018-07-26 PROCEDURE — 71046 X-RAY EXAM CHEST 2 VIEWS: CPT

## 2018-07-26 PROCEDURE — 96361 HYDRATE IV INFUSION ADD-ON: CPT

## 2018-07-26 PROCEDURE — 81001 URINALYSIS AUTO W/SCOPE: CPT | Performed by: STUDENT IN AN ORGANIZED HEALTH CARE EDUCATION/TRAINING PROGRAM

## 2018-07-26 PROCEDURE — 25000132 ZZH RX MED GY IP 250 OP 250 PS 637: Performed by: ORTHOPAEDIC SURGERY

## 2018-07-26 PROCEDURE — 99285 EMERGENCY DEPT VISIT HI MDM: CPT | Mod: 25

## 2018-07-26 PROCEDURE — 99285 EMERGENCY DEPT VISIT HI MDM: CPT | Mod: 25 | Performed by: STUDENT IN AN ORGANIZED HEALTH CARE EDUCATION/TRAINING PROGRAM

## 2018-07-26 PROCEDURE — 97116 GAIT TRAINING THERAPY: CPT | Mod: GP

## 2018-07-26 PROCEDURE — 25000132 ZZH RX MED GY IP 250 OP 250 PS 637: Performed by: STUDENT IN AN ORGANIZED HEALTH CARE EDUCATION/TRAINING PROGRAM

## 2018-07-26 PROCEDURE — 97110 THERAPEUTIC EXERCISES: CPT | Mod: GP

## 2018-07-26 PROCEDURE — 80053 COMPREHEN METABOLIC PANEL: CPT | Performed by: STUDENT IN AN ORGANIZED HEALTH CARE EDUCATION/TRAINING PROGRAM

## 2018-07-26 PROCEDURE — 83605 ASSAY OF LACTIC ACID: CPT | Performed by: STUDENT IN AN ORGANIZED HEALTH CARE EDUCATION/TRAINING PROGRAM

## 2018-07-26 RX ORDER — OXYCODONE HYDROCHLORIDE 5 MG/1
5-10 TABLET ORAL
Qty: 60 TABLET | Refills: 0 | Status: SHIPPED | OUTPATIENT
Start: 2018-07-26 | End: 2021-05-11

## 2018-07-26 RX ORDER — LIDOCAINE 40 MG/G
CREAM TOPICAL
Status: DISCONTINUED | OUTPATIENT
Start: 2018-07-26 | End: 2018-07-28 | Stop reason: HOSPADM

## 2018-07-26 RX ORDER — ACETAMINOPHEN 325 MG/1
650 TABLET ORAL EVERY 4 HOURS PRN
Qty: 100 TABLET | Refills: 0 | Status: SHIPPED | OUTPATIENT
Start: 2018-07-26 | End: 2021-05-11

## 2018-07-26 RX ORDER — AMOXICILLIN 250 MG
1 CAPSULE ORAL 2 TIMES DAILY
Qty: 100 TABLET | Refills: 0 | Status: SHIPPED | OUTPATIENT
Start: 2018-07-26 | End: 2021-05-11

## 2018-07-26 RX ADMIN — ACETAMINOPHEN 975 MG: 325 TABLET ORAL at 09:34

## 2018-07-26 RX ADMIN — HYDROXYZINE HYDROCHLORIDE 25 MG: 25 TABLET ORAL at 14:20

## 2018-07-26 RX ADMIN — OXYCODONE HYDROCHLORIDE 10 MG: 5 TABLET ORAL at 11:03

## 2018-07-26 RX ADMIN — OXYCODONE HYDROCHLORIDE 10 MG: 5 TABLET ORAL at 14:14

## 2018-07-26 RX ADMIN — ACETAMINOPHEN 975 MG: 325 TABLET ORAL at 01:31

## 2018-07-26 RX ADMIN — GABAPENTIN 300 MG: 300 CAPSULE ORAL at 07:49

## 2018-07-26 RX ADMIN — SODIUM CHLORIDE, POTASSIUM CHLORIDE, SODIUM LACTATE AND CALCIUM CHLORIDE 1500 ML: 600; 310; 30; 20 INJECTION, SOLUTION INTRAVENOUS at 22:36

## 2018-07-26 RX ADMIN — OXYCODONE HYDROCHLORIDE 10 MG: 5 TABLET ORAL at 04:51

## 2018-07-26 RX ADMIN — SENNOSIDES AND DOCUSATE SODIUM 2 TABLET: 8.6; 5 TABLET ORAL at 07:49

## 2018-07-26 RX ADMIN — OXYCODONE HYDROCHLORIDE 10 MG: 5 TABLET ORAL at 01:31

## 2018-07-26 RX ADMIN — IBUPROFEN 600 MG: 400 TABLET ORAL at 22:36

## 2018-07-26 RX ADMIN — ENOXAPARIN SODIUM 40 MG: 40 INJECTION SUBCUTANEOUS at 11:32

## 2018-07-26 RX ADMIN — OXYCODONE HYDROCHLORIDE 10 MG: 5 TABLET ORAL at 07:49

## 2018-07-26 RX ADMIN — HYDROMORPHONE HYDROCHLORIDE 1 MG: 1 INJECTION, SOLUTION INTRAMUSCULAR; INTRAVENOUS; SUBCUTANEOUS at 22:36

## 2018-07-26 RX ADMIN — CITALOPRAM HYDROBROMIDE 20 MG: 20 TABLET ORAL at 07:49

## 2018-07-26 ASSESSMENT — ACTIVITIES OF DAILY LIVING (ADL)
ADLS_ACUITY_SCORE: 10

## 2018-07-26 ASSESSMENT — PAIN DESCRIPTION - DESCRIPTORS
DESCRIPTORS: ACHING
DESCRIPTORS: ACHING

## 2018-07-26 NOTE — PROGRESS NOTES
"[facility] Medicine Progress Note  Date of Service: 07/26/2018    Assessment & Plan   Preeti Sherman is a 62 year old female who presented on 7/23/2018 for scheduled Procedure(s):  ARTHROPLASTY KNEE by Ugo Brady MD and is being followed by the hospital medicine service for co-management of acute and/or chronic perioperative medical problems.      S/p Procedure(s):  ARTHROPLASTY KNEE   3 Days Post-Op    Pain well managed, worse with movement. Hg 9.6 on 7/25/2018. Suspect associated with recent surgery and will gradually improve.   - pain control, wound cares, physical therapy, occupational therapy and DVT prophylaxis per orthopedic surgery service    Major depression in partial remission   Stable.   - continue home Celexa     Hyperlipidemia LDL goal <130  Per chart review. Not currently on any medications for this.  - Continue outpatient management     History of hepatitis B  Per chart review. Follows at [facility], records not available.    DVT Prophylaxis: as per orthopedic surgery service - Enoxaparin (Lovenox) subcutaneous while inpatient, ASA at discharge  Code Status: Full Code    Lines: PIV left hand   Morfin catheter: None    Discussion: Medically, the patient appears stable. Vital signs stable.    Disposition: Anticipate discharge today to TCU.     Attestation:  I have reviewed today's vital signs, notes, medications, labs and imaging.  Total time: 15 minutes    This patient was discussed with Dr. Douglas Rodriguez. Plan as above.    Astrid Raman PA-C  [facility] Medicine      Interval History   Patient was seen this morning.     Pain well managed with current medications  Activity has been \"a struggle\". Though feels motivated to continue her recovery.  Good appetite. Slept well.  No BM, only has one BM per week typically. She is passing gas. Voiding regularly.     Denies HA, lightheadedness, dizziness, fever, chills, chest pain, palpitations, SOB, cough, wheezes, " "abdominal pain, N/V/D, numbness or tingling in bilateral lower extremities. Right foot was a little tingly earlier like it was \"asleep\", now improved.    Physical Exam   Temp:  [98.3  F (36.8  C)-100.4  F (38  C)] 98.8  F (37.1  C)  Pulse:  [71-76] 71  Resp:  [16-18] 18  BP: (114-139)/(67-77) 132/67  SpO2:  [96 %-98 %] 98 %    Weights:   Vitals:    07/23/18 1015   Weight: 59.9 kg (132 lb)    Body mass index is 25.35 kg/(m^2).    General: Appears well, sitting up comfortably. Alert and orientated. Pleasant and cooperative. NAD. Non-toxic. Appears stated age.   CV: Regular rate, normal rhythm. Radial pulses are 2+ bilaterally. Distal pulses intact. Capillary refill is < 2 seconds in bilateral lower extremities. No lower extremity edema.  Respiratory: No accessory muscle usage. Clear to auscultation bilaterally. No wheezes, crackles or rhonchi.   GI: Soft, non-tender, non-distended. Bowel sounds are normoactive in a quadrants.  Skin: Warm, dry, intact. Did not assess incision, dressing appear clean and dry.  Musculoskeletal: Muscle tone is appropriate. Moves all extremities freely with limited range of motion right lower extremity due to pain and bandaged.  Neuro: Sensation to light touch of bilateral lower extremities is grossly intact.     Data     Recent Labs  Lab 07/26/18  0551 07/25/18  0530 07/24/18  0556 07/20/18  1320   WBC  --   --   --  9.1   HGB  --  9.6* 10.9* 13.5   MCV  --   --   --  93     --  253 323   CR  --   --  0.72  --    GLC  --  96  --   --          Recent Labs  Lab 07/25/18  0530   GLC 96        Unresulted Labs Ordered in the Past 30 Days of this Admission     No orders found from 5/24/2018 to 7/24/2018.         Imaging  No results found for this or any previous visit (from the past 24 hour(s)).     I reviewed all new labs and imaging results over the last 24 hours. I personally reviewed no images or EKG's today.    Medications       citalopram  20 mg Oral QAM     enoxaparin  40 mg " Subcutaneous Q24H     senna-docusate  1 tablet Oral BID    Or     senna-docusate  2 tablet Oral BID     sodium chloride (PF)  3 mL Intracatheter Q8H     I have discussed patient with Dr. Douglas Rodriguez.    Astrid Raman, [facility]

## 2018-07-26 NOTE — IP AVS SNAPSHOT
` ` Patient Information     Patient Name Sex     Preeti Sherman ([id number]) Female 1955       Room Bed    2211      Patient Demographics     Address Phone    [address] [address] [phone] (Home)  [phone] (Mobile)      Patient Ethnicity & Race     Ethnic Group Patient Race    American White      Emergency Contact(s)     Name Relation Home Work Mobile    AVTAR SHERMAN Spouse [phone]      Documents on File        Status Date Received Description       Documents for the Patient    Affiliate Privacy placeholder   phase3    Consent for EHR Access Received 18     Insurance Card       Patient ID       [facility] Specified Other       Privacy Notice - [facility] Received 18     Consent for Services - Hospital and Clinic Received 18     HIE Auth Received 18        Documents for the Encounter    CMS IM for Patient Signature       Observation Notice Received 18       Admission Information     Attending Provider Admitting Provider Admission Type Admission Date/Time    Iván Winn MD Khan, Shams, MD Emergency 18    Discharge Date Hospital Service Auth/Cert Status Service Area     Hospitalist [facility]    Unit Room/Bed Admission Status       [facility]  Admission (Confirmed)       Admission     Complaint    Postoperative fever      Hospital Account     Name Acct ID Class Status Primary Coverage    Preeti Sherman [id number] Observation Open Swipp - Swipp OPEN ACCESS            Guarantor Account (for Hospital Account #[id number])     Name Relation to Pt Service Area Active? Acct Type    Preeti Sherman Self FCS Yes Personal/Family    Address Phone          [address], [place] [address] [phone](H)              Coverage Information (for Hospital Account #[id number])     F/O Payor/Plan Precert #    Swipp/Swipp OPEN ACCESS     Subscriber  Subscriber #    Luigi Sherman [id number]    Address Phone    [address]  [place] [address] [phone]

## 2018-07-26 NOTE — IP AVS SNAPSHOT
[facility] SURGICAL: [phone]                                              INTERAGENCY TRANSFER FORM - PHYSICIAN ORDERS   2018                    Hospital Admission Date: 2018  GINGER CHRISTIANSON   : 1955  Sex: Female        Attending Provider: Iván Winn MD     Allergies:  Penicillins, Sulfa Drugs    Infection:  None   Service:  HOSPITALIST    Ht:  1.524 m (5')   Wt:  63.3 kg (139 lb 8.8 oz)   Admission Wt:  59 kg (130 lb)    BMI:  27.25 kg/m 2   BSA:  1.64 m 2            Patient PCP Information     Provider PCP Type    [facility]      ED Clinical Impression     Diagnosis Description Comment Added By Time Added    Postoperative fever [R50.82] Postoperative fever [R50.82]  Elmer Yang DO 2018 12:46 AM    Postoperative pain of right knee [M25.561, G89.18] Postoperative pain of right knee [M25.561, G89.18]  Elmer Yang DO 2018 12:46 AM      Hospital Problems as of 2018              Priority Class Noted POA    Postoperative fever Medium  2018 Yes      Non-Hospital Problems as of 2018     None      Code Status History     Date Active Date Inactive Code Status Order ID Comments User Context    2018 10:48 AM  Full Code [id number]  Iván Winn MD Outpatient    2018 11:08 AM 2018 10:48 AM Full Code [id number]  Astrid Raman PA-C Inpatient         Medication Review      CONTINUE these medications which may have CHANGED, or have new prescriptions. If we are uncertain of the size of tablets/capsules you have at home, strength may be listed as something that might have changed.        Dose / Directions Comments    acetaminophen 500 MG tablet   Commonly known as:  TYLENOL   This may have changed:    - medication strength  - how much to take  - when to take this  - reasons to take this   Used for:  Postoperative pain of right knee        Dose:  1000 mg   Take 2 tablets (1,000 mg) by mouth 3 times daily    Quantity:  100 tablet   Refills:  0          CONTINUE these medications which have NOT CHANGED        Dose / Directions Comments    aspirin 325 MG tablet        Dose:  325 mg   Take 325 mg by mouth daily   Refills:  0        Biotin 1 MG Caps        Dose:  1 mg   Take 1 mg by mouth daily   Refills:  0        Calcium 250 MG Caps        Dose:  250 mg   Take 250 mg by mouth daily   Refills:  0        citalopram 20 MG tablet   Commonly known as:  celeXA        Dose:  20 mg   Take 20 mg by mouth daily   Refills:  0        OXYCODONE HCL PO        Dose:  5-10 mg   Take 5-10 mg by mouth every 3 hours as needed (pain)   Refills:  0        senna-docusate 8.6-50 MG per tablet   Commonly known as:  SENOKOT-S;PERICOLACE        Dose:  1 tablet   Take 1 tablet by mouth 2 times daily   Refills:  0        TURMERIC CURCUMIN PO        Dose:  1 capsule   Take 1 capsule by mouth daily   Refills:  0                After Care     Activity - Up with assistive device           Advance Diet as Tolerated       Follow this diet upon discharge: Orders Placed This Encounter      Regular Diet Adult       General info for SNF       Length of Stay Estimate: Short Term Care: Estimated # of Days <30  Condition at Discharge: Improving  Level of care:skilled   Rehabilitation Potential: Good  Admission H&P remains valid and up-to-date: Yes  Recent Chemotherapy: N/A  Use Nursing Home Standing Orders: Yes       Mantoux instructions       Give two-step Mantoux (PPD) Per Facility Policy Yes             Referrals     Occupational Therapy Adult Consult       Evaluate and treat as clinically indicated.    Reason:  Right TKA       Physical Therapy Adult Consult       Evaluate and treat as clinically indicated.    Reason:  Right TKA             Statement of Approval     Ordered          07/28/18 1304  I have reviewed and agree with all the recommendations and orders detailed in this document.  EFFECTIVE NOW     Approved and electronically signed by:  Pa  Iván PALUMBO MD

## 2018-07-26 NOTE — PROGRESS NOTES
Care Transitions Discharge: [facility] Phone: (Admissions: [phone] RN Report: [phone] Fax: [phone]) , TCU wants the signed dc orders  ASAP. TCU aware of discharge- Family transportation time of 1630    Name: Preeti Sherman    MRN: [id number]    Reason for Hospitalization: right knee osteoarthritis  Right knee DJD    Cognitive/Behavioral Status: awake, alert and oriented    Follow-up Appointments: No future appointments.    Discharge Date:  7/26/2018, Transportation per family @ 1630     Patient/Care Partner in agreement and understands the discharge plan:  Yes    Discharge Disposition:  transitional care unit    Discharge Planner   Discharge Plans in progress: [facility] Phone: (Admissions: [phone] RN Report: [phone] Fax: [phone])   Barriers to discharge plan: Medical stability  Follow up plan: Ortho       Entered by: Aishwarya Brian 07/26/2018 10:41 AM         Aishwarya Brian RN Care Coordinator  [phone]

## 2018-07-26 NOTE — PHARMACY - DISCHARGE MEDICATION RECONCILIATION
Discharge medication review for this patient is complete.   Patient was not counseled or given any education materials as discharged to TCU facility,  See EPIC for allergy information, prior to admission medications and immunization status.   Pharmacist assisted with medication reconciliation of discharge medications with PTA medications.    MD was contacted with any questions/concerns:None    Additional medication history information:None    Discharge Medication List     Review of your medicines      START taking       Dose / Directions    acetaminophen 325 MG tablet   Commonly known as:  TYLENOL        Dose:  650 mg   Take 2 tablets (650 mg) by mouth every 4 hours as needed for mild pain   Quantity:  100 tablet   Refills:  0       aspirin 325 MG EC tablet        Dose:  325 mg   Take 1 tablet (325 mg) by mouth daily   Quantity:  42 tablet   Refills:  0       oxyCODONE IR 5 MG tablet   Commonly known as:  ROXICODONE        Dose:  5-10 mg   Take 1-2 tablets (5-10 mg) by mouth every 3 hours as needed for other (pain control or improvement in physical function. Hold dose for analgesic side effects.)   Quantity:  60 tablet   Refills:  0       senna-docusate 8.6-50 MG per tablet   Commonly known as:  SENOKOT-S;PERICOLACE        Dose:  1 tablet   Take 1 tablet by mouth 2 times daily   Quantity:  100 tablet   Refills:  0         CONTINUE these medicines which may have CHANGED, or have new prescriptions. If we are uncertain of the size of tablets/capsules you have at home, strength may be listed as something that might have changed.       Dose / Directions    citalopram 20 MG tablet   Commonly known as:  celeXA   This may have changed:    - how much to take  - how to take this  - when to take this  - additional instructions   Used for:  Major depressive disorder, recurrent episode, in partial remission (H)        Take 1/2 tablet (10 mg) for 1-2 weeks, then increase to 1 tablet orally daily   Quantity:  30 tablet   Refills:   1         CONTINUE these medicines which have NOT CHANGED       Dose / Directions    Biotin 1 MG Caps   Notes to Patient:  Resume        Dose:  1 capsule   Take 1 capsule by mouth daily   Refills:  0       Calcium 250 MG Caps   Notes to Patient:  Resume        Dose:  1 capsule   Take 1 capsule by mouth daily   Refills:  0       TURMERIC CURCUMIN PO   Notes to Patient:  Resume        Dose:  1 capsule   Take 1 capsule by mouth daily   Refills:  0            Where to get your medicines      These medications were sent to [facility] [place], [place] - [address]  [address] [facility] [address]     Phone:  [phone]      acetaminophen 325 MG tablet     aspirin 325 MG EC tablet     senna-docusate 8.6-50 MG per tablet         Some of these will need a paper prescription and others can be bought over the counter. Ask your nurse if you have questions.     Bring a paper prescription for each of these medications      oxyCODONE IR 5 MG tablet             Sima Neumann, [facility]

## 2018-07-26 NOTE — PROGRESS NOTES
"[facility] Progress Note      Post-operative Day: 3 Days Post-Op    Procedure(s):  Right Total Knee Arthroplasty - Wound Class: I-Clean      Subjective:    Pain: moderate  aching to R knee. Denies shooting pain, parasthesias, numbness and weakness.   denies Chest pain, SOB, O2 required: None  denies nausea/ emesis  denies lightheadedness, dizziness and weakness  BM -, passing gas +. Urinating well, Morfin in place: no.       Objective:  Blood pressure 132/67, pulse 71, temperature 98.8  F (37.1  C), temperature source Oral, resp. rate 18, height 1.537 m (5' 0.51\"), weight 59.9 kg (132 lb), SpO2 98 %.    Patient Vitals for the past 24 hrs:   BP Temp Temp src Pulse Resp SpO2   07/26/18 0804 132/67 98.8  F (37.1  C) Oral 71 18 98 %   07/26/18 0455 - 99  F (37.2  C) Oral - - -   07/26/18 0128 139/77 100.4  F (38  C) Oral 73 16 97 %   07/25/18 1525 114/67 98.3  F (36.8  C) Oral 76 16 96 %       Wt Readings from Last 4 Encounters:   07/23/18 59.9 kg (132 lb)   07/20/18 59.9 kg (132 lb)   07/15/15 58.1 kg (128 lb)   07/07/15 58.2 kg (128 lb 3.2 oz)     General: Alert and orientated. No apparent distress. Non-labored breathing. Appropriate affect.   MSK: R knee: dressing Clean, dry, and intact. Skin intact, no ecchymosis, no erythema. nontender to palpation to incision site. ROM appropriate for post op. Distal neurovascularly intact. Compartments soft and non-tender. No calf pain. Homans negative. PF/DF and EHL intact.       Pertinent Labs   Lab Results: personally reviewed.     Recent Labs   Lab Test  07/26/18   0551  07/25/18   0530  07/24/18   0556  07/20/18   1320  07/15/15   1532  03/07/12   1136  06/30/11   1131   HGB   --   9.6*  10.9*  13.5  14.2  14.1  13.8   HCT   --    --    --   40.7  42.8  42.5  41.8   MCV   --    --    --   93  93  89  91   PLT  223   --   253  323  311  311  298   NA   --    --    --    --   138  139  141         Procedure(s):  Right Total Knee Arthroplasty - Wound Class: " I-Clean  Plan: Anticoagulation protocol: Lovenox inpatient and then  mg daily at discharge  x 42  days            Pain medications:  oxycodone and tylenol            Weight bearing status:  WBAT            Dressing Change:  sterile dry dressing change with gauze and ACE. Aliao to remain intact until follow up.             Disposition:  TCU today             Follow up: 2 week with SHANE            Continue cares and rehabilitation     Report completed by:  Mary Cannon PA-C  Date: 7/26/2018  Time: 9:51 AM

## 2018-07-26 NOTE — IP AVS SNAPSHOT
[facility]: [phone]                                              INTERAGENCY TRANSFER FORM - LAB / IMAGING / EKG / EMG RESULTS   2018                    Hospital Admission Date: 2018  GINGER CHRISTIANSON   : 1955  Sex: Female        Attending Provider: vIán Winn MD     Allergies:  Penicillins, Sulfa Drugs    Infection:  None   Service:  HOSPITALIST    Ht:  1.524 m (5')   Wt:  63.3 kg (139 lb 8.8 oz)   Admission Wt:  59 kg (130 lb)    BMI:  27.25 kg/m 2   BSA:  1.64 m 2            Patient PCP Information     Provider PCP Type    [facility]         Lab Results - 3 Days      Basic metabolic panel [[id number]]  Resulted: 18, Result status: Final result    Ordering provider: Astrid Raman PA-C  18 0000 Resulting lab: [facility]    Specimen Information    Type Source Collected On   Blood  18          Components       Value Reference Range Flag Lab   Sodium 134 133 - 144 mmol/L  59   Potassium 3.9 3.4 - 5.3 mmol/L  59   Chloride 101 94 - 109 mmol/L  59   Carbon Dioxide 28 20 - 32 mmol/L  59   Anion Gap 5 3 - 14 mmol/L  59   Glucose 99 70 - 99 mg/dL  59   Urea Nitrogen 14 7 - 30 mg/dL  59   Creatinine 0.77 0.52 - 1.04 mg/dL  59   GFR Estimate 76 >60 mL/min/1.7m2  59   Comment:  Non  GFR Calc   GFR Estimate If Black >90 >60 mL/min/1.7m2  59   Comment:  African American GFR Calc   Calcium 9.0 8.5 - 10.1 mg/dL  59            CBC with platelets [[id number]] (Abnormal)  Resulted: 18 0546, Result status: Final result    Ordering provider: Astrid Raman PA-C  18 0000 Resulting lab: [facility]    Specimen Information    Type Source Collected On   Blood  18          Components       Value Reference Range Flag Lab   WBC 7.1 4.0 - 11.0 10e9/L  59   RBC Count 3.31 3.8 - 5.2 10e12/L L 59   Hemoglobin 10.0 11.7 - 15.7 g/dL L 59   Hematocrit 30.8 35.0 -  47.0 % L 59   MCV 93 78 - 100 fl  59   MCH 30.2 26.5 - 33.0 pg  59   MCHC 32.5 31.5 - 36.5 g/dL  59   RDW 13.3 10.0 - 15.0 %  59   Platelet Count 252 150 - 450 10e9/L  59            Urine Culture Aerobic Bacterial [[id number]]  Resulted: 07/28/18 0401, Result status: Final result    Ordering provider: Elmer Yang,   07/26/18 2212 Resulting lab: [facility]    Specimen Information    Type Source Collected On   Midstream Urine Urine Midstream 07/26/18 2255          Components       Value Reference Range Flag Lab   Specimen Description Midstream Urine      Special Requests Specimen received in preservative   75   Culture Micro No growth   225            Blood culture [[id number]]  Resulted: 07/27/18 1943, Result status: Preliminary result    Ordering provider: Elmer Yang,   07/26/18 2212 Resulting lab: [facility]    Specimen Information    Type Source Collected On   Blood Arm, Right 07/26/18 2230   Comment:  Right Arm          Components       Value Reference Range Flag Lab   Specimen Description Blood Right Arm      Special Requests Aerobic and anaerobic bottles received   226   Culture Micro No growth after 15 hours   226            Blood culture [[id number]]  Resulted: 07/27/18 1943, Result status: Preliminary result    Ordering provider: Elmer Yang,   07/26/18 2212 Resulting lab: [facility]    Specimen Information    Type Source Collected On   Blood Arm, Left 07/26/18 2210   Comment:  Left Arm          Components       Value Reference Range Flag Lab   Specimen Description Blood Left Arm      Special Requests Aerobic and anaerobic bottles received   226   Culture Micro No growth after 15 hours   226            Procalcitonin [[id number]]  Resulted: 07/27/18 1432, Result status: Final result    Ordering provider: Astrid Raman PA-C  07/27/18 1102 Resulting lab: [facility]    Specimen Information    Type  Source Collected On   Blood  07/27/18 0536          Components       Value Reference Range Flag Lab   Procalcitonin 0.06 ng/ml  51   Comment:         0.05-0.24 ng/ml Low risk of systemic bacterial infection. Local bacterial   infection possible.  Recommendation: Assess other clinical features of   infection. Discourage antibiotics unless strong clinical suspicion for serious   infection.              Basic metabolic panel [[id number]] (Abnormal)  Resulted: 07/27/18 0557, Result status: Final result    Ordering provider: Douglas Rodriguez MD  07/27/18 0317 Resulting lab: [facility]    Specimen Information    Type Source Collected On   Blood  07/27/18 0536          Components       Value Reference Range Flag Lab   Sodium 141 133 - 144 mmol/L  59   Potassium 4.5 3.4 - 5.3 mmol/L  59   Chloride 106 94 - 109 mmol/L  59   Carbon Dioxide 33 20 - 32 mmol/L H 59   Anion Gap 2 3 - 14 mmol/L L 59   Glucose 106 70 - 99 mg/dL H 59   Urea Nitrogen 9 7 - 30 mg/dL  59   Creatinine 0.73 0.52 - 1.04 mg/dL  59   GFR Estimate 81 >60 mL/min/1.7m2  59   Comment:  Non  GFR Calc   GFR Estimate If Black >90 >60 mL/min/1.7m2  59   Comment:  African American GFR Calc   Calcium 8.7 8.5 - 10.1 mg/dL  59            CBC with platelets differential [[id number]] (Abnormal)  Resulted: 07/27/18 0542, Result status: Final result    Ordering provider: Douglas Rodriguez MD  07/27/18 0317 Resulting lab: [facility]    Specimen Information    Type Source Collected On   Blood  07/27/18 0536          Components       Value Reference Range Flag Lab   WBC 6.5 4.0 - 11.0 10e9/L  59   RBC Count 3.17 3.8 - 5.2 10e12/L L 59   Hemoglobin 9.8 11.7 - 15.7 g/dL L 59   Hematocrit 30.0 35.0 - 47.0 % L 59   MCV 95 78 - 100 fl  59   MCH 30.9 26.5 - 33.0 pg  59   MCHC 32.7 31.5 - 36.5 g/dL  59   RDW 13.6 10.0 - 15.0 %  59   Platelet Count 225 150 - 450 10e9/L  59   Diff Method Automated Method   59   % Neutrophils 57.2 %  59   %  Lymphocytes 28.7 %  59   % Monocytes 9.5 %  59   % Eosinophils 3.8 %  59   % Basophils 0.5 %  59   % Immature Granulocytes 0.3 %  59   Nucleated RBCs 0 0 /100  59   Absolute Neutrophil 3.7 1.6 - 8.3 10e9/L  59   Absolute Lymphocytes 1.9 0.8 - 5.3 10e9/L  59   Absolute Monocytes 0.6 0.0 - 1.3 10e9/L  59   Absolute Eosinophils 0.3 0.0 - 0.7 10e9/L  59   Absolute Basophils 0.0 0.0 - 0.2 10e9/L  59   Abs Immature Granulocytes 0.0 0 - 0.4 10e9/L  59   Absolute Nucleated RBC 0.0   59            UA with Microscopic [[id number]]  Resulted: 07/27/18 0002, Result status: Final result    Ordering provider: Emler Yang,   07/26/18 2212 Resulting lab: [facility]    Specimen Information    Type Source Collected On   Midstream Urine Urine Midstream 07/26/18 2255          Components       Value Reference Range Flag Lab   Color Urine Yellow   59   Appearance Urine Clear   59   Glucose Urine Negative NEG^Negative mg/dL  59   Bilirubin Urine Negative NEG^Negative  59   Ketones Urine Negative NEG^Negative mg/dL  59   Specific Gravity Urine 1.011 1.003 - 1.035  59   Blood Urine Negative NEG^Negative  59   pH Urine 7.0 5.0 - 7.0 pH  59   Protein Albumin Urine Negative NEG^Negative mg/dL  59   Urobilinogen mg/dL 0.0 0.0 - 2.0 mg/dL  59   Nitrite Urine Negative NEG^Negative  59   Leukocyte Esterase Urine Negative NEG^Negative  59   Source Midstream Urine   59   WBC Urine 2 0 - 5 /HPF  59   RBC Urine 1 0 - 2 /HPF  59            Erythrocyte sedimentation rate auto [[id number]] (Abnormal)  Resulted: 07/26/18 2335, Result status: Final result    Ordering provider: Elmer Yang,   07/26/18 2218 Resulting lab: [facility]    Specimen Information    Type Source Collected On   Blood  07/26/18 2210          Components       Value Reference Range Flag Lab   Sed Rate 37 0 - 30 mm/h H 59            INR [[id number]]  Resulted: 07/26/18 2254, Result status: Final result    Ordering provider: Elmer Yang  LASHELL, DO  07/26/18 2212 Resulting lab: [facility]    Specimen Information    Type Source Collected On   Blood  07/26/18 2210          Components       Value Reference Range Flag Lab   INR 0.90 0.86 - 1.14  59            CRP Inflammation [[id number]] (Abnormal)  Resulted: 07/26/18 2253, Result status: Final result    Ordering provider: Elmer Yang,   07/26/18 2218 Resulting lab: [facility]    Specimen Information    Type Source Collected On   Blood  07/26/18 2210          Components       Value Reference Range Flag Lab   CRP Inflammation 150.0 0.0 - 8.0 mg/L H 59            Comprehensive metabolic panel [[id number]] (Abnormal)  Resulted: 07/26/18 2253, Result status: Final result    Ordering provider: Elmer Yang, DO  07/26/18 2212 Resulting lab: [facility]    Specimen Information    Type Source Collected On   Blood  07/26/18 2210          Components       Value Reference Range Flag Lab   Sodium 134 133 - 144 mmol/L  59   Potassium 3.9 3.4 - 5.3 mmol/L  59   Chloride 100 94 - 109 mmol/L  59   Carbon Dioxide 30 20 - 32 mmol/L  59   Anion Gap 4 3 - 14 mmol/L  59   Glucose 101 70 - 99 mg/dL H 59   Urea Nitrogen 10 7 - 30 mg/dL  59   Creatinine 0.81 0.52 - 1.04 mg/dL  59   GFR Estimate 71 >60 mL/min/1.7m2  59   Comment:  Non  GFR Calc   GFR Estimate If Black 86 >60 mL/min/1.7m2  59   Comment:  African American GFR Calc   Calcium 9.2 8.5 - 10.1 mg/dL  59   Bilirubin Total 0.5 0.2 - 1.3 mg/dL  59   Albumin 3.3 3.4 - 5.0 g/dL L 59   Protein Total 7.1 6.8 - 8.8 g/dL  59   Alkaline Phosphatase 71 40 - 150 U/L  59   ALT 26 0 - 50 U/L  59   AST 21 0 - 45 U/L  59            Blood gas venous [[id number]] (Abnormal)  Resulted: 07/26/18 2248, Result status: Final result    Ordering provider: Elmer Yang,   07/26/18 2212 Resulting lab: [facility]    Specimen Information    Type Source Collected On   Blood  07/26/18 2230           Components       Value Reference Range Flag Lab   Ph Venous 7.48 7.32 - 7.43 pH H 59   PCO2 Venous 42 40 - 50 mm Hg  59   PO2 Venous 36 25 - 47 mm Hg  59   Bicarbonate Venous 31 21 - 28 mmol/L H 59   Base Excess Venous 7.0 mmol/L  59   Comment:  Abnormal Result, Ref range: -7.7 to 1.9            CBC with platelets differential [[id number]] (Abnormal)  Resulted: 07/26/18 2237, Result status: Final result    Ordering provider: Elmer Yang,   07/26/18 2212 Resulting lab: [facility]    Specimen Information    Type Source Collected On   Blood  07/26/18 2210          Components       Value Reference Range Flag Lab   WBC 9.2 4.0 - 11.0 10e9/L  59   RBC Count 3.66 3.8 - 5.2 10e12/L L 59   Hemoglobin 11.1 11.7 - 15.7 g/dL L 59   Hematocrit 34.3 35.0 - 47.0 % L 59   MCV 94 78 - 100 fl  59   MCH 30.3 26.5 - 33.0 pg  59   MCHC 32.4 31.5 - 36.5 g/dL  59   RDW 13.7 10.0 - 15.0 %  59   Platelet Count 262 150 - 450 10e9/L  59   Diff Method Automated Method   59   % Neutrophils 62.6 %  59   % Lymphocytes 24.7 %  59   % Monocytes 9.1 %  59   % Eosinophils 2.8 %  59   % Basophils 0.5 %  59   % Immature Granulocytes 0.3 %  59   Nucleated RBCs 0 0 /100  59   Absolute Neutrophil 5.8 1.6 - 8.3 10e9/L  59   Absolute Lymphocytes 2.3 0.8 - 5.3 10e9/L  59   Absolute Monocytes 0.8 0.0 - 1.3 10e9/L  59   Absolute Eosinophils 0.3 0.0 - 0.7 10e9/L  59   Absolute Basophils 0.1 0.0 - 0.2 10e9/L  59   Abs Immature Granulocytes 0.0 0 - 0.4 10e9/L  59   Absolute Nucleated RBC 0.0   59            Lactic acid whole blood [[id number]]  Resulted: 07/26/18 2235, Result status: Final result    Ordering provider: Elmer Yang,   07/26/18 2212 Resulting lab: [facility]    Specimen Information    Type Source Collected On   Blood  07/26/18 2881          Components       Value Reference Range Flag Lab   Lactic Acid 1.0 0.7 - 2.0 mmol/L  59            Testing Performed By     Lab - Abbreviation Name Director  Address Valid Date Range    51 - Unknown [facility] [place] Unknown [address] [facility] [id number] 12/31/14 1010 - Present    59 - Unknown [facility] Unknown [address] [facility] [address] 12/31/14 1006 - Present    75 - Unknown [facility] Unknown [address] [facility] [address] 01/15/15 1019 - Present    225 - Unknown [facility] Unknown [address] [facility] [address] 12/19/14 0954 - Present    226 - Unknown [facility] Unknown [address] [facility] [address] 12/19/14 0955 - Present            Unresulted Labs (24h ago through future)    Start       Ordered    07/27/18 0316  Creatinine  AM DRAW,   Routine     Comments:  Last Lab Result: Creatinine (mg/dL)       Date                     Value                 07/26/2018               0.81             ----------    07/27/18 0317         Imaging Results - 3 Days      US Lower Extremity Venous Duplex Right [[id number]]  Resulted: 07/27/18 0956, Result status: Final result    Ordering provider: Mary Cannon PA-C  07/27/18 0902 Resulted by: Tomas Jensen MD    Performed: 07/27/18 0930 - 07/27/18 0954 Resulting lab: RADIOLOGY RESULTS    Narrative:       US LOWER EXTREMITY VENOUS DUPLEX RIGHT 7/27/2018 9:54 AM    HISTORY: Right lower extremity edema with calf tenderness. Swelling.    TECHNIQUE: Color flow and doppler spectral waveform analysis of deep  venous structures is performed.  Imaged deep venous structures of the  right lower extremity include the right common femoral vein, femoral  vein, popliteal vein, and visualized posterior deep calf veins.    COMPARISON: None.    FINDINGS: No DVT is demonstrated. There is a complex Baker's cyst in  the popliteal fossa that measures 4.0 x 2.8 x 1.4 cm.      Impression:       IMPRESSION:  1. Negative right lower extremity venous Doppler for DVT.  2. Baker's  cyst.    VARUN MADDOX MD      XR Chest 2 Views [[id number]]  Resulted: 07/27/18 0215, Result status: Final result    Ordering provider: Elmer Yang DO  07/26/18 2212 Resulted by: Jayme Muñiz MD    Performed: 07/26/18 2320 - 07/26/18 2327 Resulting lab: RADIOLOGY RESULTS    Narrative:       CHEST 2 VIEWS  7/26/2018 11:27 PM     HISTORY: Fever.    COMPARISON: None.    FINDINGS: Curvilinear opacities in the medial aspects of bilateral  lung bases likely represent atelectasis or scarring. The lungs are  otherwise clear. Normal-sized cardiac silhouette.      Impression:       IMPRESSION: No evidence of active cardiopulmonary disease.    JAYME MUÑIZ MD      Testing Performed By     Lab - Abbreviation Name Director Address Valid Date Range    104 - Rad [facility] RADIOLOGY RESULTS Unknown Unknown 02/16/05 1553 - Present            Encounter-Level Documents:     There are no encounter-level documents.      Order-Level Documents:     There are no order-level documents.

## 2018-07-26 NOTE — PLAN OF CARE
Problem: Patient Care Overview  Goal: Plan of Care/Patient Progress Review  Outcome: Improving  Pt tolerating getting legs in and out of bed and ambulating to bathroom with stand by assist/walker.  Pt is steady on her feet.  Pt reports pain is controlled at this time, although she was stiff getting out of bed after being on the CPM, which was not in motion.  Pt reports some pain in right calf, no redness or swelling noted.  PT reports pain feels better when she stretch es th e calf.  Ice placed to calf, note left for rounding MD.  Pt plans to go to the TCU later today.

## 2018-07-26 NOTE — DISCHARGE INSTRUCTIONS
Orthopedic Surgery Discharge Instructions    1. Follow up:  Follow up with Mac Lord PA-C.  in 2 weeks for post op check and x rays as scheduled.  Call [phone] if appointment needed or questions. If you have questions or concerns while at home, please call [facility]. If it is an emergency, call [phone]. You may find the answers to questions in the included discharge packet from the hospital or your pre operative packet you received in clinic prior to surgery.    2. Pain Medication:  Use pain medication as directed. If you are prescribed narcotic pain medications (Oxycodone, Norco, Percocet, Tylenol #3, Dilaudid, or Tramadol) then you should try to wean off of them as tolerated. These are an AS NEEDED medication, so if you are not having significant pain you should try to take fewer pills at a time or spread out the doses out over a longer period of time than is written on the prescription. You should not drive a car or operate machinery if you are taking prescribed narcotic pain medication. You may not receive a refill on this medication by your surgical team until your follow up appointment, so try to wean off your narcotics as soon as possible. If you need a refill on this medication you may call your surgical team to discuss during business hours. Refills are not given on the weekend under any circumstances, so plan accordingly.    3. How to wean narcotics: Within 2-3 days of surgery you should be able to begin weaning your pain medications. If upon leaving the hospital you are taking 2 tabs every 3-4 hours, you should decrease this to 1 tab every 3-4 hours. Around 4-5 days after surgery you should begin decreasing the frequency of your pain medication to every 4-5 hours. You may also cut your pills in half to decrease the dose as you begin the weaning process. Create a weaning schedule of your pain medication when you get home from the hospital, you may be expected to make the prescription  last until your next appointment. Call your surgical team during business hours to discuss your pain medication if you have questions or concerns about the weaning process.    4. Applying ice to your incision: ice can provide additional pain relief at home. Apply  ice in 20 minute intervals. Allow your skin to warm up to room temperature, approximately 40 minutes, before applying another ice pack.    5. Incision instructions:  Keep your incision clean, covered and dry until your post op appointment.  You may shower and get the incision wet if no drainage is present.  You may change your dressing as needed.  No additional adhesives should be put on knee.  Only use dry  guaze over Prineo glue tape and then apply 4 inch ACE wrap to hold dressings in place.     6. Blood Clot Prevention: Take Aspirin 325 mg  daily  for 42 days for anticoagulation. If you develop abdominal pain or have signs of bleeding - blood in stool or black stools, stop taking the medication and seek medical care. Walk often at home, walking will help reduce the risk of blood clots. If you have been prescribed abe stockings or compression stockings, wear them during the day until you follow up with your orthopedic team in clinic. If you were not given Abe Stockings in the hospital but would like them, they can be purchased over the counter at your local pharmacy.     7. Call the office if you have any questions/concerns or are experiencing the following:  - increasing drainage from the incision  - foul-smelling or malodorous drainage from the incision  - sudden increase or change in pain that is not controlled by your prescribed medications  - inability to urinate  - new onset of weakness, numbness or severe pain in the extremities  - bowel/bladder incontinence  - Fever greater than 101.5 degrees  - Nausea/vomitting causing inability to eat food or medications

## 2018-07-26 NOTE — PLAN OF CARE
Problem: Patient Care Overview  Goal: Plan of Care/Patient Progress Review  Outcome: Adequate for Discharge Date Met: 07/26/18  Patient alert and orientated.     Problem: Knee Arthroplasty (Total, Partial) (Adult)  Goal: Signs and Symptoms of Listed Potential Problems Will be Absent, Minimized or Managed (Knee Arthroplasty)  Signs and symptoms of listed potential problems will be absent, minimized or managed by discharge/transition of care (reference Knee Arthroplasty (Total, Partial) (Adult) CPG).   Outcome: Adequate for Discharge Date Met: 07/26/18  POD #3. Dressing to right knee was changed and ace wrap re-applied. Adhesive bandage intact; no drainage noted. Patient's reports pain tolerable with scheduled oral tylenol and PRN oral oxycodone. Oral atarax given once per patient request. Patient up with stand by assist with walker and requires minimal assistance with moving right leg off of bed. Tolerating regular diet and denied any nausea. Voiding adequately. No BM yet, but passing gas. 2 tablets of senna given. Vital signs stable. Afebrile. Patient was up in the chair for breakfast and has been on one walk in the hallway. Patient needs encouragement with walking and sitting up in the chair for meals.

## 2018-07-26 NOTE — PLAN OF CARE
Discharge Planner PT   Patient plan for discharge: TCU  Current status: Patrick-SBA with bed mobility for leg assist, SBA for transfers, SBA for ambulation with FWW. Able to ambulate >200ft.  Barriers to return to prior living situation: stairs. No assistance at home.   Recommendations for discharge: TCU  Rationale for recommendations: Due to patient have no assistance at home, pt would benefit from continued skilled PT interventions to address limitations in strength, ROM and functional mobility.        Entered by: Celeste Kwon 07/26/2018 12:16 PM      Physical Therapy Discharge Summary    Reason for therapy discharge:    All goals and outcomes met, no further needs identified.    Progress towards therapy goal(s). See goals on Care Plan in [facility] electronic health record for goal details.  Goals met    Therapy recommendation(s):    Continued therapy is recommended.  Rationale/Recommendations:  strength, ROM and functional mobility.

## 2018-07-26 NOTE — IP AVS SNAPSHOT
"` `     [facility] SURGICAL: [phone]            Medication Administration Report for Preeti Sherman as of 07/28/18 1326   Legend:    Given Hold Not Given Due Canceled Entry Other Actions    Time Time (Time) Time  Time-Action       Inactive    Active    Linked        Medications 07/22/18 07/23/18 07/24/18 07/25/18 07/26/18 07/27/18 07/28/18    citalopram (celeXA) tablet 20 mg  Dose: 20 mg  Freq: DAILY Route: PO  Start: 07/27/18 0800   Admin. Amount: 1 tablet (1 × 20 mg tablet)  Last Admin: 07/28/18 0841  Dispense Loc: [facility]          0834 (20 mg)-Given        0841 (20 mg)-Given           enoxaparin (LOVENOX) injection 40 mg  Dose: 40 mg  Freq: EVERY 24 HOURS Route: SC  Start: 07/27/18 1030   Admin. Amount: 40 mg = 0.4 mL Conc: 40 mg/0.4 mL  Last Admin: 07/28/18 1005  Dispense Loc: [facility]  Volume: 0.4 mL          1058 (40 mg)-Given        1005 (40 mg)-Given           HYDROmorphone (PF) (DILAUDID) injection 0.3-0.5 mg  Dose: 0.3-0.5 mg  Freq: EVERY 2 HOURS PRN Route: IV  PRN Reason: other  PRN Comment: pain control or improvement in physical function. Hold dose for analgesic side effects.  Start: 07/27/18 0123   Admin Instructions: Notify provider to assess for uncontrolled pain or analgesic side effects. Hold while on PCA or with regular IV opioid dosing.  For ordered doses up to 4 mg give IV Push undiluted. Administer each 2mg over 2-5 minutes.    Admin. Amount: 0.3-0.5 mg  Last Admin: 07/27/18 2319  Dispense Loc: [facility]   Current Line: Peripheral IV 07/26/18 Left         0125 (0.5 mg)-Given       1844 (0.5 mg)-Given       2319 (0.5 mg)-Given            lidocaine (LMX4) kit  Freq: EVERY 1 HOUR PRN Route: Top  PRN Reason: pain  PRN Comment: with VAD insertion or accessing implanted port.  Start: 07/26/18 7800   Admin Instructions: Do NOT give if patient has a history of allergy to any local anesthetic or any \"maite\" product.   Apply 30 minutes prior to VAD insertion or port " "access.  MAX Dose:  2.5 g (  of 5 g tube)    Dispense Loc: [facility] Stock               lidocaine 1 % 1 mL  Dose: 1 mL  Freq: EVERY 1 HOUR PRN Route: OTHER  PRN Comment: mild pain with VAD insertion or accessing implanted port  Start: 07/26/18 2210   Admin Instructions: Do NOT give if patient has a history of allergy to any local anesthetic or any \"maite\" product. MAX dose 1 mL subcutaneous OR intradermal in divided doses.    Admin. Amount: 1 mL  Dispense Loc: [email or web address] 200  Volume: 5 mL               melatonin tablet 1 mg  Dose: 1 mg  Freq: AT BEDTIME PRN Route: PO  PRN Reason: sleep  Start: 07/27/18 1106   Admin Instructions: Do not give unless at least 6 hours of uninterrupted sleep is expected.    Admin. Amount: 1 tablet (1 × 1 mg tablet)  Last Admin: 07/28/18 0138  Dispense Loc: [email or web address] 400           0138 (1 mg)-Given           naloxone (NARCAN) injection 0.1-0.4 mg  Dose: 0.1-0.4 mg  Freq: EVERY 2 MIN PRN Route: IV  PRN Reason: opioid reversal  Start: 07/27/18 0123   Admin Instructions: For respiratory rate LESS than or EQUAL to 8.  Partial reversal dose:  0.1 mg titrated q 2 minutes for Analgesia Side Effects Monitoring Sedation Level of 3 (frequently drowsy, arousable, drifts to sleep during conversation).Full reversal dose:  0.4 mg bolus for Analgesia Side Effects Monitoring Sedation Level of 4 (somnolent, minimal or no response to stimulation).  For ordered doses up to 2mg give IVP. Give each 0.4mg over 15 seconds in emergency situations. For non-emergent situations further dilute in 9mL of NS to facilitate titration of response.    Admin. Amount: 0.1-0.4 mg = 0.25-1 mL Conc: 0.4 mg/mL  Dispense Loc: [email or web address] 200  Volume: 1 mL               ondansetron (ZOFRAN-ODT) ODT tab 4 mg  Dose: 4 mg  Freq: EVERY 6 HOURS PRN Route: PO  PRN Reasons: nausea,vomiting  Start: 07/27/18 0123   Admin Instructions: This is Step 1 of nausea and vomiting management.  If nausea not resolved in 15 minutes, go to Step 2 " prochlorperazine (COMPAZINE). Do not push through foil backing. Peel back foil and gently remove. Place on tongue immediately. Administration with liquid unnecessary  With dry hands, peel back foil backing and gently remove tablet; do not push oral disintegrating tablet through foil backing; administer immediately on tongue and oral disintegrating tablet dissolves in seconds; then swallow with saliva; liquid not required.    Admin. Amount: 1 tablet (1 × 4 mg tablet)  Dispense Loc: [facility] 200              Or  ondansetron (ZOFRAN) injection 4 mg  Dose: 4 mg  Freq: EVERY 6 HOURS PRN Route: IV  PRN Reasons: nausea,vomiting  Start: 07/27/18 0123   Admin Instructions: This is Step 1 of nausea and vomiting management.  If nausea not resolved in 15 minutes, go to Step 2 prochlorperazine (COMPAZINE).  Irritant. For ordered doses up to 4 mg, give IV Push undiluted over 2-5 minutes.    Admin. Amount: 4 mg = 2 mL Conc: 4 mg/2 mL  Dispense Loc: [facility] 200  Infused Over: 2-5 Minutes  Volume: 2 mL               oxyCODONE IR (ROXICODONE) tablet 5 mg  Dose: 5 mg  Freq: EVERY 3 HOURS PRN Route: PO  PRN Reason: other  PRN Comment: pain control or improvement in physical function. Hold dose for analgesic side effects.  Start: 07/27/18 0123   Admin Instructions: Start with the lowest dose.  May adjust dose by 5 mg every 3 hours as needed. Notify provider to assess for uncontrolled pain or analgesic side effects. Hold while on PCA or with regular IV opioid dosing.    Admin. Amount: 1 tablet (1 × 5 mg tablet)  Last Admin: 07/28/18 1305  Dispense Loc: [facility] 200          0301 (5 mg)-Given       0605 (5 mg)-Given       0849 (5 mg)-Given       1155 (5 mg)-Given       1455 (5 mg)-Given       1806 (5 mg)-Given       2111 (5 mg)-Given        0106 (5 mg)-Given       0401 (5 mg)-Given       0704 (5 mg)-Given       1005 (5 mg)-Given       1305 (5 mg)-Given           polyethylene glycol (MIRALAX/GLYCOLAX) Packet 17 g  Dose: 17  g  Freq: DAILY PRN Route: PO  PRN Reason: constipation  Start: 18 1107   Admin Instructions: Give in 8oz of  water, juice, or soda. Hold for loose stools.  This is the second step of a three step constipation treatment.  1 Packet = 17 grams. Mixed prescribed dose in 8 ounces of water. Follow with 8 oz. of water.    Admin. Amount: 17 g  Dispense Loc: [facility] 200               senna-docusate (SENOKOT-S;PERICOLACE) 8.6-50 MG per tablet 1 tablet  Dose: 1 tablet  Freq: 2 TIMES DAILY Route: PO  Start: 18 0800   Admin. Amount: 1 tablet  Last Admin: 18 0841  Dispense Loc: [facility]          0834 (1 tablet)-Given       2108 (1 tablet)-Given        0841 (1 tablet)-Given       [ ] 2000           sodium chloride (PF) 0.9% PF flush 3 mL  Dose: 3 mL  Freq: EVERY 8 HOURS Route: IK  Start: 18 2213   Admin Instructions: And Q1H PRN, to lock peripheral IV dormant line.    Admin. Amount: 3 mL  Last Admin: 18 0841  Dispense Loc: [facility] Floor Stock  Volume: 3 mL   Current Line: Peripheral IV 18 Left         0249 (3 mL)-Given       (0642)-Not Given       (1406)-Not Given       2108 (3 mL)-Given              2319 (3 mL)-Given        0841 (3 mL)-Given       [ ] 1413       [ ] 2213           sodium chloride (PF) 0.9% PF flush 3 mL  Dose: 3 mL  Freq: EVERY 1 HOUR PRN Route: IK  PRN Reason: line flush  PRN Comment: for peripheral IV flush post IV meds  Start: 18 221   Admin. Amount: 3 mL  Dispense Loc: [facility] Floor Stock  Volume: 3 mL              Completed Medications  Medications 18         Dose: 1 mg  Freq: ONCE Route: IV  Start: 18   End: 18   Admin. Amount: 1 mg  Last Admin: 18  Dispense Loc: [facility]  Administrations Remainin          (1 mg)-Given               Dose: 600 mg  Freq: ONCE Route: PO  Start: 18   End: 18   Admin Instructions: Do not give within 6 hours of  ketorolac (TORADOL).    Admin. Amount: 1 tablet (1 × 200 mg tablet) + 1 tablet (1 × 400 mg tablet)  Last Admin: 18  Dispense Loc: [facility]  Administrations Remainin   Mixture Administration Information:   Medication Type Amount   ibuprofen 200 MG TABS Medications 200 mg   ibuprofen 400 MG TABS Medications 400 mg                 2236 (600 mg)-Given               Dose: 1,500 mL  Freq: ONCE Route: IV  Last Dose: Stopped (18)  Start: 18   End: 18   Admin Instructions: Bolus 1 L at a time as rapidly as possible until MAP greater than or equal to 65 mmHg and patient appears euvolemic, then 150 mL/hr.  MAX 3 Liters    Admin. Amount: 1,500 mL  Last Admin: 18  Dispense Loc: [facility] Floor Stock  Administrations Remainin  Volume: 1,500 mL          (1,500 mL)-New Bag        122-Stopped           Discontinued Medications  Medications 18         Dose: 325 mg  Freq: DAILY Route: PO  Start: 18 0800   End: 18   Admin. Amount: 1 tablet (1 × 325 mg tablet)  Last Admin: 18  Dispense Loc: [facility] 200          0834 (325 mg)-Given       922-Med Discontinued          Dose: 0.1-0.4 mg  Freq: EVERY 2 MIN PRN Route: IV  PRN Reason: opioid reversal  Start: 18   End: 18   Admin Instructions: For respiratory rate LESS than or EQUAL to 8.  Partial reversal dose:  0.1 mg titrated q 2 minutes for Analgesia Side Effects Monitoring Sedation Level of 3 (frequently drowsy, arousable, drifts to sleep during conversation).Full reversal dose:  0.4 mg bolus for Analgesia Side Effects Monitoring Sedation Level of 4 (somnolent, minimal or no response to stimulation).  For ordered doses up to 2mg give IVP. Give each 0.4mg over 15 seconds in emergency situations. For non-emergent situations further dilute in 9mL of NS to facilitate titration of response.    Admin. Amount: 0.1-0.4 mg  = 0.25-1 mL Conc: 0.4 mg/mL  Volume: 1 mL          1109-Med Discontinued          Dose: 4 mg  Freq: EVERY 6 HOURS PRN Route: PO  PRN Reasons: nausea,vomiting  Start: 07/27/18 1106   End: 07/27/18 1109   Admin Instructions: This is Step 1 of nausea and vomiting management.  If nausea not resolved in 15 minutes, go to Step 2 prochlorperazine (COMPAZINE). Do not push through foil backing. Peel back foil and gently remove. Place on tongue immediately. Administration with liquid unnecessary  With dry hands, peel back foil backing and gently remove tablet; do not push oral disintegrating tablet through foil backing; administer immediately on tongue and oral disintegrating tablet dissolves in seconds; then swallow with saliva; liquid not required.    Admin. Amount: 1 tablet (1 × 4 mg tablet)          1109-Med Discontinued       Or    Dose: 4 mg  Freq: EVERY 6 HOURS PRN Route: IV  PRN Reasons: nausea,vomiting  Start: 07/27/18 1106   End: 07/27/18 1109   Admin Instructions: This is Step 1 of nausea and vomiting management.  If nausea not resolved in 15 minutes, go to Step 2 prochlorperazine (COMPAZINE).  Irritant. For ordered doses up to 4 mg, give IV Push undiluted over 2-5 minutes.    Admin. Amount: 4 mg = 2 mL Conc: 4 mg/2 mL  Infused Over: 2-5 Minutes  Volume: 2 mL          1109-Med Discontinued          Rate: 50 mL/hr   Freq: CONTINUOUS Route: IV  Start: 07/27/18 0124   End: 07/27/18 1105   Last Admin: 07/27/18 0254  Dispense Loc: [facility] Floor Stock  Volume: 1,000 mL   Current Line: Peripheral IV 07/26/18 Left         0254 ( )-New Bag       1105-Med Discontinued          Dose: 1,000 mg  Freq: EVERY 12 HOURS Route: IV  Indications of Use: PERIOPERATIVE PHARMACOPROPHYLAXIS  Indications Comment: Surgical Prophylaxis  Last Dose: 1,000 mg (07/27/18 1406)  Start: 07/27/18 0100   End: 07/27/18 1445   Admin Instructions: Vesicant.    Admin. Amount: 1,000 mg = 200 mL Conc: 1,000 mg/200 mL  Last Admin: 07/27/18 1406  Dispense Loc:  [facility]  Infused Over: 1 Hours  Volume: 200 mL          0122 (1,000 mg)-New Bag       1406 (1,000 mg)-New Bag       1445-Med Discontinued     Medications 07/22/18 07/23/18 07/24/18 07/25/18 07/26/18 07/27/18 07/28/18

## 2018-07-26 NOTE — IP AVS SNAPSHOT
` `     [facility] SURGICAL: [phone]                 INTERAGENCY TRANSFER FORM - NOTES (H&P, Discharge Summary, Consults, Procedures, Therapies)   2018                    Hospital Admission Date: 2018  PREETI SHERMAN   : 1955  Sex: Female        Patient PCP Information     Provider PCP Type    [facility] General         History & Physicals      H&P by Astrid Raman PA-C at 2018 10:16 AM     Author:  Astrid Raman PA-C Service:  Internal Medicine Author Type:  Physician Assistant - C    Filed:  2018  2:49 PM Date of Service:  2018 10:16 AM Creation Time:  2018 10:16 AM    Status:  Attested :  Astrid Raman PA-C (Physician Assistant - C)    Cosigner:  Ted Segura MD at 2018  4:10 PM        Attestation signed by Ted Segura MD at 2018  4:10 PM        Physician Attestation   I, Ted Segura, have reviewed and discussed with the advanced practice provider their history, physical and plan for Preeti Sherman. I did not participate in a shared visit by interviewing or examining the patient and this should be billed as an advanced practice provider only visit.    Ted Segura  Date of Service (when I saw the patient): I did not personally see this patient today.                               [facility]    History and Physical  Hospital Medicine       Date of Admission:  2018  Date of Service: 2018[RL1.1]     Assessment & Plan[RL1.2]   Preeti Sherman is a 62 year old female with past medical history of depression, hyperlipidemia, and hepatitis B who presents with fever and increased swelling in her surgical knee, discharged 2018 after right total knee arthroplasty.    Postoperative fever  POD 4 Right Total Knee Arthroplasty  Patient discharged yesterday after right total knee arthroplasty. Reportedly febrile up to 103, presented with fever of  101.1.[RL1.1] Patient with chills otherwise no localizing symptoms. Increased knee swelling. Stable calf and knee pain.[RL1.3] WBC normal.[RL1.1] ESR and CRP elevated, though difficult to assess significance given recent surgery. , ESR 37.[RL1.3] UA not consistent with infection. No infiltrate on CXR. ED spoke with ortho in ED who recommended admission and initiation of IV vancomycin.[RL1.1] Ortho evaluated the patient this morning, low suspicion for post-operative infection. Agree as no increase in pain at site, WBC normal, no erythema noted. Though she does have increase swelling and subtle warmth. DVT possible given calf pain with fever, US ordered and negative.[RL1.4] Low suspicion for PE without tachycardia, pain or shortness of breath, though at risk with recent surgery and did have very brief episodes of hypoxemia overnight.[RL1.5] Hemarthrosis also possible given increase in swelling. Atelectasis also possible. No current localizing infectious symptoms on review of symptoms[RL1.4] or signs exam.[RL1.3]  -[RL1.4] ortho consult, following  - pain management: acetaminophen and oxycodone  - continue enoxaparin, per orthopedic service[RL1.1]  -[RL1.3] discontinue[RL1.6] IV vancomycin[RL1.3], low suspicion[RL1.6]   - follow pending blood cultures  - add on procalcitonin[RL1.3]  - AM CBC  -[RL1.4] monitor for further fevers or localization of symptoms[RL1.3]    Post-operative anemia  Hgb 9.8. Last check, 9.6 prior to discharge. Though she was 11.1 on presentation and repeat is down just over 1 point. She did receive 1.5 L of fluids, so possibly associated with dilution.[RL1.1] Small amount of blood on sheet near knee, no acute bleeding from incision. H[RL1.4]emarthrosis[RL1.1], knee with increased swelling, also noted[RL1.4] on orthopedic note.[RL1.1] Overall hemoglobin is stable from discharge.[RL1.6]  - AM CBC  - monitor for signs of bleeding    Major depression in partial remission   Stable.   - continue  home Celexa      Hyperlipidemia LDL goal <130  Per chart review. Not currently on any medications for this.  - Continue outpatient management      History of hepatitis B  Per chart review. Follows at [facility], records not available.    FEN:  - Not indicated[RL1.1], encourage oral intake[RL1.4]  - Will monitor electrolytes and replace as needed  - regular adult diet    DVT Prophylaxis: Enoxaparin (Lovenox) subcutaneous, per orthopedic service  Code Status: Full Code    Disposition: Anticipate discharge in 1-2 days once patient improving. Appropriate for observation level care    I have discussed patient and formulated plan with Dr. Ted Segura.    Astrid Raman PA-C  [facility] Medicine[RL1.1]        Primary Care Physician[RL1.2]   [facility] [phone]    History is obtained from the patient, ED notes and review of the EMR.[RL1.1]    Past Medical History    No past medical history on file.  Patient Active Problem List    Diagnosis Date Noted     Postoperative fever 07/27/2018     Priority: Medium      Past Surgical History[RL1.2]   Total knee replacement  Urethroplasty  Hysterectomy  Appendectomy  Bladder Surgery, tumor removed  Foot surgery  Left rotator cuff repaired[RL1.1]    History of Present Illness[RL1.2]   Preeti Sherman is a 62 year old female who presents with[RL1.1] fever from TCU.    Discharged yesterday from hospital after right total knee replacement. Was feeling well when she left the hospital. Still having right knee pain as well as right sided calf pain, which started initially after surgery. Described as achy pain. Walking and pushing on it makes it worse. Pain medications have made it better. Currently pain in calf and knee is about the same as when she left.[RL1.7] Feels like her knee though is somewhat more swollen than when she left.[RL1.4]    States she felt chilled when she was at the TCU, but did not feel feverish. No cough, shortness of breath, sore throat or urinary symptoms. No  bowel movement since surgery, is passing gas.     Patient denies lightheadedness, dizziness, cough, congestion, rhinorrhea, sore throat, shortness of breath, palpitations, chest pain, abdominal pain, nausea, vomiting, diarrhea, changes in urination (dysuria, changes in frequency/urgency), rash or wounds.[RL1.7]    Prior to Admission Medications   Prior to Admission Medications   Prescriptions Last Dose Informant Patient Reported? Taking?   Biotin 1 MG CAPS   Yes Yes   Sig: Take 1 mg by mouth daily   Calcium 250 MG CAPS   Yes Yes   Sig: Take 250 mg by mouth daily   OXYCODONE HCL PO 7/27/2018 at 0315  Yes Yes   Sig: Take 5-10 mg by mouth every 3 hours as needed (pain)   TURMERIC CURCUMIN PO   Yes Yes   Sig: Take 1 capsule by mouth daily   acetaminophen (TYLENOL) 325 MG tablet 7/26/2018 at pm  Yes Yes   Sig: Take 325-650 mg by mouth every 6 hours as needed for mild pain   aspirin 325 MG tablet 7/26/2018 at Unknown time  Yes Yes   Sig: Take 325 mg by mouth daily   citalopram (CELEXA) 20 MG tablet 7/26/2018 at Unknown time  Yes Yes   Sig: Take 20 mg by mouth daily   senna-docusate (SENOKOT-S;PERICOLACE) 8.6-50 MG per tablet 7/26/2018 at pm  Yes Yes   Sig: Take 1 tablet by mouth 2 times daily      Facility-Administered Medications: None     Allergies   Allergies   Allergen Reactions     Penicillins Hives     Sulfa Drugs Hives     Family History    No family history on file.      Social History   Social History     Social History     Marital status:      Spouse name: N/A     Number of children: N/A     Years of education: N/A     Occupational History     Not on file.     Social History Main Topics     Smoking status: Never Smoker     Smokeless tobacco: Not on file     Alcohol use Yes      Comment: socially     Drug use: No     Sexual activity: Not on file     Other Topics Concern     Not on file     Social History Narrative    Primarily lives in [place]. Often comes to [place] for the summer for up to 6  weeks. Family lives in [place].      Review of Systems[RL1.2]   The 10 point Review of Systems is negative other than noted in the HPI or here.[RL1.1]     Physical Exam   /72 (BP Location: Right arm)  Temp 97.8  F (36.6  C) (Oral)  Resp 18  Ht 1.524 m (5')  Wt 63.3 kg (139 lb 8.8 oz)  SpO2 99%  BMI 27.25 kg/m2[RL1.2]     Weight:[RL1.1] 139 lbs 8.82 oz Body mass index is 27.25 kg/(m^2).[RL1.2]     Constitutional: Patient is[RL1.1] lying down comfortablly[RL1.7] on exam. Alert & oriented. Pleasant & cooperative. No apparent distress. Appears nontoxic. Appears stated age.  Eyes: Sclera are anicteric, EOMI, PEERL  HENT: Normocephalic. Atraumatic. Oropharynx is clear and moist.   Lymph/Hematologic: No epitrochlear, axillary, preauricular, postauricular, occipital, sub-mandibular, tonsillar, sub-mental, anterior or posterior cervical, or supraclavicular lymphadenopathy is appreciated.  Cardiovascular: Regular rate and normal rhythm. No murmur, rubs or gallops noted. Radial pulses are 2+ bilaterally. Distal pulses are intact.[RL1.1] Mild[RL1.7] lower extremity edema[RL1.1] of the right lower extremity[RL1.7].  Respiratory: No accessory muscle usage. Speaking in full sentences. Clear to auscultation bilaterally without wheezes, crackles or rhonchi.  GI: Normal bowel sounds present, soft, non-tender, non-distended. No rebound or guarding. No CVA tenderness.  Genitourinary: Deferred  Musculoskeletal: Normal muscle bulk and tone. Moves all extremities appropriately[RL1.1] with somewhat decreased ROM of right lower extremity due to pain Right calf tender to palpation diffusely, no real localization. Right knee swelling with slight warmth. No erythema. Distal pulses intact. Cap refill < 2. Sensation intact.[RL1.6]  Skin: Warm and dry, no rashes.   Neurologic: Neck supple. Cranial nerves 3-12 are grossly intact.  is symmetric.[RL1.1]     Data[RL1.2]   Data reviewed today:[RL1.1]     Recent Labs  Lab  07/27/18  0536 07/26/18  2210   WBC 6.5 9.2   HGB 9.8* 11.1*   MCV 95 94    262   INR  --  0.90    134   POTASSIUM 4.5 3.9   CHLORIDE 106 100   CO2 33* 30   BUN 9 10   CR 0.73 0.81   ANIONGAP 2* 4   LATOYA 8.7 9.2   * 101*   ALBUMIN  --  3.3*   PROTTOTAL  --  7.1   BILITOTAL  --  0.5   ALKPHOS  --  71   ALT  --  26   AST  --  21     Recent Results (from the past 24 hour(s))   XR Chest 2 Views    Narrative    CHEST 2 VIEWS  7/26/2018 11:27 PM     HISTORY: Fever.    COMPARISON: None.    FINDINGS: Curvilinear opacities in the medial aspects of bilateral  lung bases likely represent atelectasis or scarring. The lungs are  otherwise clear. Normal-sized cardiac silhouette.      Impression    IMPRESSION: No evidence of active cardiopulmonary disease.    JUANCARLOS VALDIVIA MD   US Lower Extremity Venous Duplex Right    Narrative    US LOWER EXTREMITY VENOUS DUPLEX RIGHT 7/27/2018 9:54 AM    HISTORY: Right lower extremity edema with calf tenderness. Swelling.    TECHNIQUE: Color flow and doppler spectral waveform analysis of deep  venous structures is performed.  Imaged deep venous structures of the  right lower extremity include the right common femoral vein, femoral  vein, popliteal vein, and visualized posterior deep calf veins.    COMPARISON: None.    FINDINGS: No DVT is demonstrated. There is a complex Baker's cyst in  the popliteal fossa that measures 4.0 x 2.8 x 1.4 cm.      Impression    IMPRESSION:  1. Negative right lower extremity venous Doppler for DVT.  2. Perez's cyst.    AVRUN MADDOX MD[RL1.2]     I personally reviewed no images or EKG's today.    I have discussed patient and formulated plan with Dr. Ted Segura.    Chart documentation with keystrokes and/or Dragon voice recognition software. Although reviewed after completion, some word and grammatical error may remain.  Astrid Raman PA-C  [facility] Medicine[RL1.1]        Revision History        User Key Date/Time User Provider Type Action     > RL1.6 2018  2:49 PM Astrid Raman PA-C Physician Assistant - SHEILA Sign     RL1.5 2018 11:29 AM Astrid Raman PA-C Physician Assistant - C      RL1.3 2018 11:08 AM Astrid Raman PA-C Physician Assistant - C      RL1.4 2018 10:58 AM Astrid Raman PA-C Physician Assistant - C      RL1.2 2018 10:48 AM Astrid Raman PA-C Physician Assistant - C      RL1.7 2018 10:41 AM Astrid Raman PA-C Physician Assistant - SHEILA      RL1.1 2018 10:16 AM Astrid Raman PA-C Physician Assistant - SHEILA                   Discharge Summaries     No notes of this type exist for this encounter.         Consult Notes      Consults by Mary Cannon PA-C at 2018  9:24 AM     Author:  Mary Cannon PA-C Service:  Orthopedics Author Type:  Physician Assistant - C    Filed:  2018  9:37 AM Date of Service:  2018  9:24 AM Creation Time:  2018  9:24 AM    Status:  Cosign Needed :  Mary Cannon PA-C (Physician Assistant - C)    Cosign Required:  Yes             [facility] Orthopaedics Consultation    Consultation - [facility] Orthopaedics  Level of consult: Consult, follow and place orders    CHRISTINE Hines 1955, MRN [id number]     Admitting Dx: Postoperative fever [R50.82]  Postoperative pain of right knee [M25.561, G89.18]  Fever, unspecified fever cause [R50.9]     PCP: Sourav Preciado Jason, [phone]     Code status:[TB1.1]  No Order[TB1.2]     Extended Emergency Contact Information  Primary Emergency Contact: AVTAR CHRISTIANSON   [facility]  Home Phone: [phone]  Mobile Phone: [phone]  Relation: Spouse     Assessment:  Post operative fever, normalized since admission. R knee edema/ erythema with pain and tenderness to calf. Low suspicion for prosthetic joint infection, higher suspicion for RLE DVT. Higher suspicion for spontaneous hemarthrosis given anticoagulation and recent  surgery.    Plan:  Venous duplex US to RLE to r/o DVT. Will change to Lovenox while inpatient pending US results. If positive, begin coumadin for DVT. If negative, monitor for signs of infection, drainage or erythema. At this point, there is not enough evidence to support joint infection but this could change.[TB1.1]     Active Problems:    Postoperative fever[TB1.2]       Chief Complaint  Fever     HPI  We have been requested to evaluate Preeti Sherman who is a 62 year old year old female for post operative fever. I evaluated the patient for her recent TKA by Dr. rodriguez on 7/23. She presents with new onset effusion to the R knee overnight and calf tenderness with mild lower extremity edema. She is uncertain if she has any mechanism of twisting to facilitate hemarthrosis. Uncertain if her pain is any worse today with associated fever.      History is obtained from the patient     Past Medical History[TB1.1]  No past medical history on file.[TB1.2]    Surgical History[TB1.1]  No past surgical history on file.[TB1.2]     Social History[TB1.1]  Social History     Social History     Marital status:      Spouse name: N/A     Number of children: N/A     Years of education: N/A     Occupational History     Not on file.     Social History Main Topics     Smoking status: Not on file     Smokeless tobacco: Not on file     Alcohol use Not on file     Drug use: Not on file     Sexual activity: Not on file     Other Topics Concern     Not on file     Social History Narrative[TB1.2]       Family History[TB1.1]  No family history on file.[TB1.2]     Allergies:[TB1.1]  Penicillins and Sulfa drugs[TB1.2]      Current Medications:[TB1.1]  Current Facility-Administered Medications   Medication     citalopram (celeXA) tablet 20 mg     enoxaparin (LOVENOX) injection 40 mg     HYDROmorphone (PF) (DILAUDID) injection 0.3-0.5 mg     lidocaine (LMX4) kit     lidocaine 1 % 1 mL     naloxone (NARCAN) injection 0.1-0.4 mg      ondansetron (ZOFRAN-ODT) ODT tab 4 mg    Or     ondansetron (ZOFRAN) injection 4 mg     oxyCODONE IR (ROXICODONE) tablet 5 mg     senna-docusate (SENOKOT-S;PERICOLACE) 8.6-50 MG per tablet 1 tablet     sodium chloride (PF) 0.9% PF flush 3 mL     sodium chloride (PF) 0.9% PF flush 3 mL     sodium chloride 0.9% infusion     vancomycin (VANCOCIN) 1000 mg in dextrose 5% 200 mL PREMIX[TB1.2]       Review of Systems:  The Review of Systems is negative other than noted in the HPI    Physical Exam:[TB1.1]  Temp:  [97.8  F (36.6  C)-101.1  F (38.4  C)] 97.8  F (36.6  C)  Heart Rate:  [67-94] 67  Resp:  [18-20] 18  BP: ()/(54-80) 113/72  SpO2:  [92 %-99 %] 99 %[TB1.2]    MSK: R knee: mild-moderate joint effusion. Slightly warm to palpation. No signs of erythema or drainage. AROM 0-90 without grimacing. Distal NVI. Calf tender to palpation and with mild edema.      Pertinent Labs  Lab Results: personally reviewed.[TB1.1]  Lab Results   Component Value Date    WBC 6.5 07/27/2018    HGB 9.8 (L) 07/27/2018    HCT 30.0 (L) 07/27/2018    MCV 95 07/27/2018     07/27/2018       Recent Labs  Lab 07/26/18  2210   INR 0.90[TB1.2]       Pertinent Radiology  Radiology Results: images and radiology report reviewed  Recent Results (from the past 24 hour(s))   XR Chest 2 Views    Narrative    CHEST 2 VIEWS  7/26/2018 11:27 PM     HISTORY: Fever.    COMPARISON: None.    FINDINGS: Curvilinear opacities in the medial aspects of bilateral  lung bases likely represent atelectasis or scarring. The lungs are  otherwise clear. Normal-sized cardiac silhouette.      Impression    IMPRESSION: No evidence of active cardiopulmonary disease.    JUANCARLOS VALDIVIA MD       Attestation:  I have reviewed today's vital signs, notes, medications, labs and imaging.  Amount of time performed on this consult: 30 minutes.     Mary Cannon[TB1.1]       Revision History        User Key Date/Time User Provider Type Action    > TB1.2 7/27/2018  9:37 AM  Mary Cannon PA-C Physician Assistant - SHEILA Sign     TB1.1 7/27/2018  9:24 AM Mary Cannon PA-C Physician Assistant - SHEILA                      Progress Notes - Physician (Notes from 07/25/18 through 07/28/18)      Progress Notes by Nina Paige LICSW at 7/28/2018 10:28 AM     Author:  Nina Paige LICSW Service:  (none) Author Type:      Filed:  7/28/2018 10:28 AM Date of Service:  7/28/2018 10:28 AM Creation Time:  7/28/2018 10:27 AM    Status:  Signed :  Nina Paige LICSW ()         CARE TRANSITION SOCIAL WORK INITIAL ASSESSMENT:  Reason For Consult: discharge planning   Met with: Patient.    DATA  Active Problems:    Postoperative fever       Primary Care Clinic Name:  ([facility])     Contact information and PCP information verified: Yes      ASSESSMENT  Cognitive Status: awake, alert and oriented.       Resources List: Skilled Nursing Facility, Transitional Care     Lives With: spouse  Living Arrangements: house     Description of Support System: Supportive, Involved   Who is your support system?: , Children   Support Assessment: Adequate family and caregiver support, Adequate social supports   Insurance Concerns: No Insurance issues identified        This writer met with pt introduced self and role. Discussed discharge planning and medicare guidelines in regards to home care and SNF benefits. Pt is admitted here from [facility] TCU Phone: (Admissions: [phone] RN Report: [phone] Fax: [phone])  And the plan is for her to return there today. Pts dtr is transporting pt at 1400.       PLAN    TCU    Discharge Planner   Discharge Plans in progress: TCu  Barriers to discharge plan: none  Follow up plan: DC       Entered by: Nina Paige 07/28/2018 10:27 AM             ISABEL Mccurdy, [facility] [phone][AK1.1]       Revision History        User Key Date/Time User Provider Type Action    > AK1.1 7/28/2018 10:28  "AM Nina Paige LICSW  Sign            Progress Notes by Ed Bazan PA-C at 7/28/2018  8:39 AM     Author:  Ed Bazan PA-C Service:  (none) Author Type:  Physician Assistant    Filed:  7/28/2018  8:44 AM Date of Service:  7/28/2018  8:39 AM Creation Time:  7/28/2018  8:39 AM    Status:  Signed :  Ed Bazan PA-C (Physician Assistant)         [facility] Progress Note      Post-operative Day:      * No surgery found *      Subjective:    Pain: minimal  Chest pain, SOB:  No  Knee feels \"swollen and warm\".    Objective:  Blood pressure 115/74, pulse 93, temperature 98.6  F (37  C), temperature source Oral, resp. rate 18, height 1.524 m (5'), weight 63.3 kg (139 lb 8.8 oz), SpO2 94 %.    Doppler US was negative for DVT. Chest XR did show likely atelectasis changes.    Patient Vitals for the past 24 hrs:   BP Temp Temp src Pulse Heart Rate Resp SpO2   07/28/18 0832 115/74 - - 93 - 18 94 %   07/28/18 0410 119/69 98.6  F (37  C) Oral 80 - 20 96 %   07/27/18 2316 129/68 99.2  F (37.3  C) Oral 81 - 18 95 %   07/27/18 1942 114/66 98.8  F (37.1  C) Oral 74 - 18 94 %   07/27/18 1551 112/68 99.3  F (37.4  C) Oral - 74 16 96 %   07/27/18 1156 105/59 98.5  F (36.9  C) Oral - 84 18 94 %       Wt Readings from Last 4 Encounters:   07/27/18 63.3 kg (139 lb 8.8 oz)     Patient sitting up comfortably eating breakfast this am.  She is able to easily straight leg raise and move the knee through motion without pain.  Knee without erythema. Moderate effusion typical of TKA one week postop.   Motor function, sensation, and circulation intact   Yes  Wound status: incisions are clean dry and intact. Yes  Calf tenderness: Bilateral  No    Pertinent Labs   Lab Results: personally reviewed.     Recent Labs   Lab Test  07/28/18   0528  07/27/18   0536  07/26/18   4680   INR   --    --   0.90   HGB  10.0*  9.8*  11.1*   HCT  30.8*  30.0*  34.3*   MCV  93  95  94   PLT  252  " "225  262   NA  134  141  134   CRP   --    --   150.0*       Plan: Anticoagulation protocol: Lovenox inpatient and then  mg daily at discharge  x 42  days            Pain medications:  oxycodone            Weight bearing status:  WBAT            Disposition:  Back to TCU today likely per hospital medicine            Continue cares and rehabilitation     Report completed by:  Ed Bazan PA-C  Date: 7/28/2018  Time: 8:39 AM[PL1.1]     Revision History        User Key Date/Time User Provider Type Action    > PL1.1 7/28/2018  8:44 AM Ed Bazan PA-C Physician Assistant Sign            Progress Notes by Leah Chi RN at 7/27/2018  6:15 AM     Author:  Leah Chi RN Service:  Skilled Nursing Author Type:  Registered Nurse    Filed:  7/27/2018  6:26 AM Date of Service:  7/27/2018  6:15 AM Creation Time:  7/27/2018  6:15 AM    Status:  Signed :  Leah Chi RN (Registered Nurse)         Pt is frustrated with equipment. Was monitoring SATs with cont pulse ox that was noted to drop down to 88-89% for a few seconds and then would rebound back into the mid to high 90s. This drop would cause the machine to alarm which would interrupt her sleep making patient irritable.   With initiation of tele, pt stated, \"Come on. I'm not here for my heart and don't even have a heart history. I'm here for my knee.\" Pt declined tele monitor.   MILLER Cabral updated with order to DC cont pulse ox and tele monitoring.[SB1.1]      Revision History        User Key Date/Time User Provider Type Action    > SB1.1 7/27/2018  6:26 AM Leah Chi RN Registered Nurse Sign            Progress Notes by Leah Chi RN at 7/27/2018  5:48 AM     Author:  Leah Chi RN Service:  Skilled Nursing Author Type:  Registered Nurse    Filed:  7/27/2018  5:48 AM Date of Service:  7/27/2018  5:48 AM Creation Time:  7/27/2018  5:48 AM    Status:  Signed :  Leah Chi RN (Registered Nurse)         Secondary skin " check completed and agree with recorded assessment and johnson scale document by admitting nurse.[SB1.1]      Revision History        User Key Date/Time User Provider Type Action    > SB1.1 7/27/2018  5:48 AM Leah Chi RN Registered Nurse Sign            ED Notes by Manny Pedersen RN at 7/27/2018  2:20 AM     Author:  Manny Pedersen RN Service:  Nursing Author Type:  Registered Nurse    Filed:  7/27/2018  2:26 AM Date of Service:  7/27/2018  2:20 AM Creation Time:  7/27/2018  2:26 AM    Status:  Signed :  Manny Pdeersen RN (Registered Nurse)         Report called to RN Chasity and all questions answered. She is to assume care of PT upon arrival to room 2211.[KM1.1]      Revision History        User Key Date/Time User Provider Type Action    > KM1.1 7/27/2018  2:26 AM Manny Pedersen RN Registered Nurse Sign            ED Notes by Manny Pedersen RN at 7/27/2018  2:00 AM     Author:  Manny Pedersen RN Service:  Nursing Author Type:  Registered Nurse    Filed:  7/27/2018  2:26 AM Date of Service:  7/27/2018  2:00 AM Creation Time:  7/27/2018  2:25 AM    Status:  Signed :  Manny Pedersen RN (Registered Nurse)         PT mis resting in position of comfort. States her pain is getting worse. No further changes in PT condition from previous assessments. All needs are being met and all comfort measures are being addressed. Awaiting MD cole and orders at this time.[KM1.1]      Revision History        User Key Date/Time User Provider Type Action    > KM1.1 7/27/2018  2:26 AM Manny Pedersen RN Registered Nurse Sign            ED Notes by Ed Sheikh RN at 7/27/2018  2:00 AM     Author:  Ed Sheikh RN Service:  Emergency Medicine Author Type:  Registered Nurse    Filed:  7/27/2018  2:00 AM Date of Service:  7/27/2018  2:00 AM Creation Time:  7/27/2018  2:00 AM    Status:  Signed :  Ed Sheikh RN (Registered Nurse)         Patient has  [place] to Observation  order. Patient has been given  "the Observation brochure -  What does Observation mean to me.\"  Patient has been given the opportunity to ask questions about observation status and their plan of care.      Ed Sheikh[PC1.1]     Revision History        User Key Date/Time User Provider Type Action    > PC1.1 7/27/2018  2:00 AM Ed Sheikh RN Registered Nurse Sign            ED Notes by Manny Pedersen RN at 7/27/2018  1:15 AM     Author:  Manny Pedersen RN Service:  Nursing Author Type:  Registered Nurse    Filed:  7/27/2018  1:17 AM Date of Service:  7/27/2018  1:15 AM Creation Time:  7/27/2018  1:17 AM    Status:  Signed :  Manny Pedersen RN (Registered Nurse)         PT OOB to bathroom and ambulating on her own feet with standby assist.[KM1.1]      Revision History        User Key Date/Time User Provider Type Action    > KM1.1 7/27/2018  1:17 AM Manny Pedersen RN Registered Nurse Sign            ED Notes by Manny Pedersen RN at 7/27/2018  1:00 AM     Author:  Manny Pedersen RN Service:  Nursing Author Type:  Registered Nurse    Filed:  7/27/2018  1:17 AM Date of Service:  7/27/2018  1:00 AM Creation Time:  7/27/2018  1:16 AM    Status:  Signed :  Manny Pedersen RN (Registered Nurse)         No changes in PT condition from previous assessments. All needs are being met and all comfort measures are being addressed. Awaiting MD dispo at this time.[KM1.1]      Revision History        User Key Date/Time User Provider Type Action    > KM1.1 7/27/2018  1:17 AM Manny Pedersen RN Registered Nurse Sign            ED Provider Notes by Elmer Yang DO at 7/26/2018  9:49 PM     Author:  Elmer Yang DO Service:  Emergency Medicine Author Type:  Physician    Filed:  7/27/2018 12:57 AM Date of Service:  7/26/2018  9:49 PM Creation Time:  7/26/2018 10:54 PM    Status:  Signed :  Elmer Yang DO (Physician)           History[TS1.1]     Chief Complaint   Patient presents with     Knee Pain     right, post op day " 3[TS1.2]     HPI  Preeti Sherman is a 62 year old female who presents from transitional care for evaluation of fever with increasing right knee swelling and pain status post arthroplasty.  Records confirm the patient had right knee arthroplasty performed by orthopedic surgeon Dr. Brady on Monday, 7/23/18.  She maintains that she felt relatively well for the first day or so but over the past 2 days had gradually increasing chills and right knee swelling.  Today she spiked a fever of 103 F so was transferred to the department for evaluation.  Patient has been receiving medications including oxycodone and acetaminophen with little relief.  She denies headache, sore throat, chest pain, cough, abdominal pain, nausea/vomiting, diarrhea, dysuria, or skin rashes.  She is not currently on antibiotics.  No known exacerbating or alleviating factors.    Problem List:[TS1.1]    There are no active problems to display for this patient.[TS1.2]       Past Medical History:[TS1.1]    No past medical history on file.[TS1.2]    Past Surgical History:[TS1.1]    No past surgical history on file.[TS1.2]    Family History:[TS1.1]    No family history on file.[TS1.2]    Social History:  Marital Status:   [2][TS1.1]  Social History   Substance Use Topics     Smoking status: Not on file     Smokeless tobacco: Not on file     Alcohol use Not on file[TS1.2]        Medications:[TS1.1]      No current outpatient prescriptions on file.[TS1.2]      Review of Systems  Constitutional: Positive for fever with chills.  HENT:  Negative oral or throat pain.   Cardiovascular:  Negative for chest pain.  Respiratory:  Negative for cough or shortness of breath.  Gastrointestinal:  Negative for abdominal pain, nausea, vomiting, or diarrhea.  Genitourinary:  Negative for dysuria.  Musculoskeletal: Positive for right knee pain and swelling postoperatively.  Negative for neck stiffness or back pain.  Neurological:  Negative for headache.  Skin:  Negative  for rash.    All others reviewed and are negative.      Physical Exam[TS1.1]   BP: 112/79  Heart Rate: 94  Temp: 101.1  F (38.4  C)  Resp: 20  Weight: 59 kg (130 lb)  SpO2: 97 %[TS1.2]      Physical Exam  Constitutional:  Well developed, well nourished.  Appears nontoxic moderately uncomfortable secondary to right knee pain.  HENT:  Normocephalic and atraumatic.  Symmetric in appearance.  Eyes:  Conjunctivae are normal.  Neck:  Neck supple.  Cardiovascular:  No cyanosis.  RRR.  No audible murmurs noted.  No lower extremity edema or asymmetry.   Respiratory:  Effort normal without sign of respiratory distress.  CTAB without diminished regions.  No wheezing, rhonchi, or crackles.  Gastrointestinal:  Soft, nondistended abdomen.  Nontender and without guarding.  No rigidity or rebound tenderness.  Negative Lees's sign.  Negative McBurney's point.   Genitourinary:  Noncontributory.   Musculoskeletal: Postoperative incision of her anterior active right knee appears clean, dry, intact and without dehiscence.  Moderate warmth, swelling, and tenderness around the knee, medial > lateral.    Neurological:  Patient is alert.  Skin:  Skin is warm and dry.  Psychiatric:  Normal mood and affect.      ED Course[TS1.1]     ED Course[TS1.2]     Procedures               Critical Care time:  none[TS1.1]               Results for orders placed or performed during the hospital encounter of 07/26/18 (from the past 24 hour(s))   CBC with platelets differential   Result Value Ref Range    WBC 9.2 4.0 - 11.0 10e9/L    RBC Count 3.66 (L) 3.8 - 5.2 10e12/L    Hemoglobin 11.1 (L) 11.7 - 15.7 g/dL    Hematocrit 34.3 (L) 35.0 - 47.0 %    MCV 94 78 - 100 fl    MCH 30.3 26.5 - 33.0 pg    MCHC 32.4 31.5 - 36.5 g/dL    RDW 13.7 10.0 - 15.0 %    Platelet Count 262 150 - 450 10e9/L    Diff Method Automated Method     % Neutrophils 62.6 %    % Lymphocytes 24.7 %    % Monocytes 9.1 %    % Eosinophils 2.8 %    % Basophils 0.5 %    % Immature  Granulocytes 0.3 %    Nucleated RBCs 0 0 /100    Absolute Neutrophil 5.8 1.6 - 8.3 10e9/L    Absolute Lymphocytes 2.3 0.8 - 5.3 10e9/L    Absolute Monocytes 0.8 0.0 - 1.3 10e9/L    Absolute Eosinophils 0.3 0.0 - 0.7 10e9/L    Absolute Basophils 0.1 0.0 - 0.2 10e9/L    Abs Immature Granulocytes 0.0 0 - 0.4 10e9/L    Absolute Nucleated RBC 0.0    Comprehensive metabolic panel   Result Value Ref Range    Sodium 134 133 - 144 mmol/L    Potassium 3.9 3.4 - 5.3 mmol/L    Chloride 100 94 - 109 mmol/L    Carbon Dioxide 30 20 - 32 mmol/L    Anion Gap 4 3 - 14 mmol/L    Glucose 101 (H) 70 - 99 mg/dL    Urea Nitrogen 10 7 - 30 mg/dL    Creatinine 0.81 0.52 - 1.04 mg/dL    GFR Estimate 71 >60 mL/min/1.7m2    GFR Estimate If Black 86 >60 mL/min/1.7m2    Calcium 9.2 8.5 - 10.1 mg/dL    Bilirubin Total 0.5 0.2 - 1.3 mg/dL    Albumin 3.3 (L) 3.4 - 5.0 g/dL    Protein Total 7.1 6.8 - 8.8 g/dL    Alkaline Phosphatase 71 40 - 150 U/L    ALT 26 0 - 50 U/L    AST 21 0 - 45 U/L   INR   Result Value Ref Range    INR 0.90 0.86 - 1.14   Lactic acid whole blood   Result Value Ref Range    Lactic Acid 1.0 0.7 - 2.0 mmol/L   Erythrocyte sedimentation rate auto   Result Value Ref Range    Sed Rate 37 (H) 0 - 30 mm/h   CRP Inflammation   Result Value Ref Range    CRP Inflammation 150.0 (H) 0.0 - 8.0 mg/L   Blood gas venous   Result Value Ref Range    Ph Venous 7.48 (H) 7.32 - 7.43 pH    PCO2 Venous 42 40 - 50 mm Hg    PO2 Venous 36 25 - 47 mm Hg    Bicarbonate Venous 31 (H) 21 - 28 mmol/L    Base Excess Venous 7.0 mmol/L   UA with Microscopic   Result Value Ref Range    Color Urine Yellow     Appearance Urine Clear     Glucose Urine Negative NEG^Negative mg/dL    Bilirubin Urine Negative NEG^Negative    Ketones Urine Negative NEG^Negative mg/dL    Specific Gravity Urine 1.011 1.003 - 1.035    Blood Urine Negative NEG^Negative    pH Urine 7.0 5.0 - 7.0 pH    Protein Albumin Urine Negative NEG^Negative mg/dL    Urobilinogen mg/dL 0.0 0.0 - 2.0  mg/dL    Nitrite Urine Negative NEG^Negative    Leukocyte Esterase Urine Negative NEG^Negative    Source Midstream Urine     WBC Urine 2 0 - 5 /HPF    RBC Urine 1 0 - 2 /HPF   XR Chest 2 Views    Narrative    CHEST 2 VIEWS  7/26/2018 11:27 PM     HISTORY: Fever.    COMPARISON: None.    FINDINGS: Curvilinear opacities in the medial aspects of bilateral  lung bases likely represent atelectasis or scarring. The lungs are  otherwise clear. Normal-sized cardiac silhouette.      Impression    IMPRESSION: No evidence of active cardiopulmonary disease.       Medications   lidocaine 1 % 1 mL (not administered)   lidocaine (LMX4) kit (not administered)   sodium chloride (PF) 0.9% PF flush 3 mL (not administered)   sodium chloride (PF) 0.9% PF flush 3 mL (not administered)   vancomycin (VANCOCIN) 1000 mg in dextrose 5% 200 mL PREMIX (not administered)   lactated ringers BOLUS 1,500 mL (1,500 mLs Intravenous New Bag 7/26/18 2236)   ibuprofen (ADVIL/MOTRIN) tablet 600 mg (600 mg Oral Given 7/26/18 2236)   HYDROmorphone (DILAUDID) injection 1 mg (1 mg Intravenous Given 7/26/18 2236)[TS1.2]         Assessments & Plan (with Medical Decision Making)[TS1.1]   Preeti Sherman is a 62 year old female who presented to the department for evaluation of increasing right knee pain and swelling with fever from local transitional care facility.  She had received antipyretics prior to EMS transport but arrived febrile with oral temperature >101 F.  Other than chills, she denies infectious symptoms such as headache, sore throat, cough, chest pain, abdominal pain, nausea/vomiting, diarrhea, or dysuria.  She has been tolerating by mouth well but has yet to have a bowel movement since her procedure.  Abdominal examination benign and clear lung sounds to auscultation.  Analysis without sign of infection.  Chest radiograph independently reviewed and no sign of infiltrate.  Low suspicion for pulmonary embolism with stable vital signs and no hypoxia,  tachypnea, chest pain or or shortness of breath.  CBC is without leukocytosis and lactate within reference range, however ESR and CRP are significantly elevated.  These are nonspecific markers in particular in the postoperative state, however there is no other identifiable infectious focus at this time.  Consulted on-call orthopedist Dr. Lai who recommended medical admission with IV antibiotic vancomycin, orthopedics to consult in the morning.  Hospitalist MILLER Reed has accepted ongoing care for the patient at this time.  No further recommendations.  Temporary transition orders were placed per hospital protocol to prevent any potential delay in patient care.    Patient has been informed of her results and the recommendation for admission.  They have verbalized an understanding, all questions answered, and they are in agreement with the plan at this time.      Disclaimer:  This note consists of symbols derived from keyboarding, dictation, and/or voice recognition software.  As a result, there may be errors in the script that have gone undetected.  Please consider this when interpreting information found in the chart.[TS1.3]        I have reviewed the nursing notes.    I have reviewed the findings, diagnosis, plan and need for follow up with the patient.[TS1.1]       New Prescriptions    No medications on file       Final diagnoses:   Postoperative fever   Postoperative pain of right knee[TS1.2]       7/26/2018   [facility] EMERGENCY DEPARTMENT[TS1.1]     Elmer Yang DO  07/27/18 0057  [TS1.2]     Revision History        User Key Date/Time User Provider Type Action    > TS1.2 7/27/2018 12:57 AM Elmer Yang DO Physician Sign     TS1.3 7/27/2018 12:52 AM Elmer Yang DO Physician      TS1.1 7/26/2018 10:54 PM Elmer Yang DO Physician             ED Notes by Manny Pedersen RN at 7/27/2018 12:00 AM     Author:  Manny Pedersen RN Service:  Nursing Author Type:  Registered Nurse    Filed:   7/27/2018 12:06 AM Date of Service:  7/27/2018 12:00 AM Creation Time:  7/27/2018 12:05 AM    Status:  Signed :  Manny Pedersen RN (Registered Nurse)         PT is resting in position of comfort at this time. States she is pain free. No further changes in PT condition from previous assessments. All needs are being met and all comfort measures ar being addressed. Awaiting MD dispo at this time. I will continue to assess and monitor PT.[KM1.1]     Revision History        User Key Date/Time User Provider Type Action    > KM1.1 7/27/2018 12:06 AM Manny Pedersen RN Registered Nurse Sign            ED Notes by Manny Pedersen RN at 7/26/2018 11:25 PM     Author:  Manny Pedersen RN Service:  Nursing Author Type:  Registered Nurse    Filed:  7/26/2018 11:34 PM Date of Service:  7/26/2018 11:25 PM Creation Time:  7/26/2018 11:34 PM    Status:  Signed :  Manny Pedersen RN (Registered Nurse)         PT back from Radiology and placed back on the monitor. States pain is tolerable at this time.[KM1.1]      Revision History        User Key Date/Time User Provider Type Action    > KM1.1 7/26/2018 11:34 PM Manny Pedersen RN Registered Nurse Sign            ED Notes by Manny Pedersen RN at 7/26/2018 11:10 PM     Author:  Manny Pedersen RN Service:  Nursing Author Type:  Registered Nurse    Filed:  7/26/2018 11:34 PM Date of Service:  7/26/2018 11:10 PM Creation Time:  7/26/2018 11:34 PM    Status:  Signed :  Manny Pedersen RN (Registered Nurse)         PT to Radiology[KM1.1]      Revision History        User Key Date/Time User Provider Type Action    > KM1.1 7/26/2018 11:34 PM Manny Pedersen RN Registered Nurse Sign            ED Notes by Manny Pedersen RN at 7/26/2018 11:00 PM     Author:  Manny Pedersen RN Service:  Nursing Author Type:  Registered Nurse    Filed:  7/26/2018 11:21 PM Date of Service:  7/26/2018 11:00 PM Creation Time:  7/26/2018 11:20 PM    Status:  Signed :  Manny Pedersen RN  (Registered Nurse)         PT is resting in position of comfort at this time and states pain is tolerable at this time. No further changes in PT condition from previous assessment. Awaiting PT to go to Radiology. I will continue to assess and monitor PT.[KM1.1]     Revision History        User Key Date/Time User Provider Type Action    > KM1.1 7/26/2018 11:21 PM Manny Pedersen RN Registered Nurse Sign            ED Notes by Manny Pedersen RN at 7/26/2018 10:40 PM     Author:  Manny Pedersen RN Service:  Nursing Author Type:  Registered Nurse    Filed:  7/26/2018 11:18 PM Date of Service:  7/26/2018 10:40 PM Creation Time:  7/26/2018 11:16 PM    Status:  Signed :  Manny Pedersen RN (Registered Nurse)         PT OOB to bathroom. Up on her feet and walking behind wheelchair with standby assist. Urine sample collected and sent to lab.[KM1.1]      Revision History        User Key Date/Time User Provider Type Action    > KM1.1 7/26/2018 11:18 PM Manny Pedersen RN Registered Nurse Sign            ED Notes by Manny Pedersen RN at 7/26/2018  9:54 PM     Author:  Manny Pedersen RN Service:  Nursing Author Type:  Registered Nurse    Filed:  7/26/2018 11:16 PM Date of Service:  7/26/2018  9:54 PM Creation Time:  7/26/2018 11:11 PM    Status:  Signed :  Manny Pedersen RN (Registered Nurse)         Preeti Sherman is a 62 year old female who presents to the ED VIA EMS from [facility] for fever and possibly infected wound. PT is post op day 3 from a total right knee replacement. States today she was discharged from the hospital and sent to rehab. States upon arrival to to rehab she noted the wound to be more painful, and hot to touch and PT noted to be febrile.   At this time wound is noted to have no drainage and hot to touch. PT is noted to be febrile. PT is A&OX4, appears to be in pain. All needs are being assessed and will be met and all comfort measures are being addressed. MD at bedside and  awaiting orders at this time.[KM1.1]      Revision History        User Key Date/Time User Provider Type Action    > KM1.1 7/26/2018 11:16 PM Manny Pedersen RN Registered Nurse Sign            ED Notes by Manny Pedersen RN at 7/26/2018  9:49 PM     Author:  Manny Pedersen RN Service:  (none) Author Type:  Registered Nurse    Filed:  7/26/2018  9:49 PM Date of Service:  7/26/2018  9:49 PM Creation Time:  7/26/2018  9:49 PM    Status:  Signed :  Manny Pedersen RN (Registered Nurse)         Bed: ED02  Expected date:   Expected time:   Means of arrival:   Comments:  EMS knee septic     Revision History        User Key Date/Time User Provider Type Action    > KM1.1 7/26/2018  9:49 PM Manny Pedersen RN Registered Nurse Sign                  Procedure Notes     No notes of this type exist for this encounter.      Progress Notes - Therapies (Notes from 07/25/18 through 07/28/18)     No notes of this type exist for this encounter.

## 2018-07-26 NOTE — IP AVS SNAPSHOT
` `           [facility] SURGICAL: [phone]                                              INTERAGENCY TRANSFER FORM - NURSING   2018                    Hospital Admission Date: 2018  GINGER CHRISTIANSON   : 1955  Sex: Female        Attending Provider: Iván Winn MD     Allergies:  Penicillins, Sulfa Drugs    Infection:  None   Service:  HOSPITALIST    Ht:  1.524 m (5')   Wt:  63.3 kg (139 lb 8.8 oz)   Admission Wt:  59 kg (130 lb)    BMI:  27.25 kg/m 2   BSA:  1.64 m 2            Patient PCP Information     Provider PCP Type    [facility]      Current Code Status     Date Active Code Status Order ID Comments User Context       Prior      Code Status History     Date Active Date Inactive Code Status Order ID Comments User Context    2018 10:48 AM  Full Code [id number]  Iván Winn MD Outpatient    2018 11:08 AM 2018 10:48 AM Full Code [id number]  Astrid Raman PAAlejandroC Inpatient      Advance Directives        Scanned docmt in ACP Activity?           Yes, scanned ACP docmt        Hospital Problems as of 2018              Priority Class Noted POA    Postoperative fever Medium  2018 Yes      Non-Hospital Problems as of 2018     None      Immunizations     None         END      ASSESSMENT     Discharge Profile Flowsheet     EXPECTED DISCHARGE     Inspection of bony prominences  Full 18 0334    Expected Discharge Date  18 1027   Full except areas not inspected   Buttock, left;Buttock, right;Sacrum;Coccyx 18 0952    DISCHARGE NEEDS ASSESSMENT     Inspection under devices  Full 18 0334    Primary Care Clinic Name  -- ([facility]) 18 1027   Skin WDL  ex 18 0334    GASTROINTESTINAL (ADULT,PEDIATRIC,OB)     Skin Color/Characteristics  bruised (ecchymotic) (right knee) 18 0334    GI WDL  WDL 18 0334   Skin Temperature  warm 18 0334    Last Bowel Movement  18 1709  "  Skin Moisture  dry 07/28/18 0334    COMMUNICATION ASSESSMENT     Skin Elasticity  quick return to original state 07/28/18 0334    Patient's communication style  spoken language (English or Bilingual) 07/27/18 0248   Skin Integrity  incision(s) 07/28/18 0334    FINAL RESOURCES     SAFETY      Resources List  Skilled Nursing Facility;Transitional Care 07/28/18 1027   Safety WDL  WDL 07/28/18 0334    SKIN     All Alarms  none present 07/28/18 0334                 Assessment WDL (Within Defined Limits) Definitions           Safety WDL     Effective: 09/28/15    Row Information: <b>WDL Definition:</b> Bed in low position, wheels locked; call light in reach; upper side rails up x 2; ID band on<br> <font color=\"gray\"><i>Item=AS safety wdl>>List=AS safety wdl>>Version=F14</i></font>      Skin WDL     Effective: 09/28/15    Row Information: <b>WDL Definition:</b> Warm; dry; intact; elastic; without discoloration; pressure points without redness<br> <font color=\"gray\"><i>Item=AS skin wdl>>List=AS skin wdl>>Version=F14</i></font>      Vitals     Vital Signs Flowsheet     VITAL SIGNS     Sleep  normal sleep 07/28/18 0456    Temp  98.6  F (37  C) 07/28/18 0412   ANALGESIA SIDE EFFECTS MONITORING      Temp src  Oral 07/28/18 0412   Side Effects Monitoring: Respiratory Quality  R 07/27/18 2150    Resp  18 07/28/18 0832   Side Effects Monitoring: Respiratory Depth  N 07/27/18 2150    Pulse  93 07/28/18 0832   Side Effects Monitoring: Sedation Level  1 07/27/18 2150    Heart Rate  74 07/27/18 1551   HEIGHT AND WEIGHT      Pulse/Heart Rate Source  Monitor 07/28/18 0832   Height  1.524 m (5') 07/27/18 0241    BP  115/74 07/28/18 0832   Height Method  Stated 07/27/18 0241    BP Location  Right arm 07/28/18 0832   Height Method  Stated 07/26/18 2155    OXYGEN THERAPY     Weight  63.3 kg (139 lb 8.8 oz) 07/27/18 0241    SpO2  94 % 07/28/18 0832   Weight Method  Bed scale 07/27/18 0241    O2 Device  None (Room air) 07/28/18 0832   BSA " (Calculated - sq m)  1.64 07/27/18 0241    PAIN/COMFORT     BMI (Calculated)  27.31 07/27/18 0241    Patient Currently in Pain  yes 07/28/18 0328   POSITIONING      Preferred Pain Scale  CAPA (Clinically Aligned Pain Assessment) ([facility] Adults Only) 07/28/18 0328   Body Position  supine, head elevated 07/28/18 0334    Patient's Stated Pain Goal  3 07/27/18 0219   Head of Bed (HOB)  HOB at 20 degrees 07/28/18 0334    0-10 Pain Scale  8 07/28/18 0706   Chair  Upright in chair 07/27/18 1159    Pain Location  Knee 07/28/18 0328   DAILY CARE      Pain Orientation  Right 07/28/18 0328   Activity Management  ambulated to bathroom;ambulated in room 07/28/18 1157    Pain Descriptors  Aching 07/27/18 0220   Activity Assistance Provided  assistance, stand-by 07/28/18 1157    Pain Management Interventions  cold applied 07/28/18 0328   Assistive Device Utilized  walker 07/28/18 1157    Pain Intervention(s)  Medication (See eMAR);Cold applied 07/28/18 0328   MARLO COMA SCALE      CLINICALLY ALIGNED PAIN ASSESSMENT (CAPA) ([facility] ADULTS ONLY)     Best Eye Response  4-->(E4) spontaneous 07/28/18 0334    Comfort  comfortably manageable 07/28/18 0456   Best Motor Response  6-->(M6) obeys commands 07/28/18 0334    Change in Pain  getting better 07/28/18 0456   Best Verbal Response  5-->(V5) oriented 07/28/18 0334    Pain Control  partially effective 07/28/18 0456   Marlo Coma Scale Score  15 07/28/18 0334    Functioning  can do most things, but pain gets in the way of some 07/28/18 0456                 Patient Lines/Drains/Airways Status    Active LINES/DRAINS/AIRWAYS     Name: Placement date: Placement time: Site: Days: Last dressing change:    Peripheral IV 07/26/18 Left 07/26/18 2000 1             Patient Lines/Drains/Airways Status    Active PICC/CVC     None            Intake/Output Detail Report     Date Intake     Output    Shift P.O. I.V. IV Piggyback Total Total       Noc  07/26/18 2300 - 07/27/18 0659 -- 3 -- 3 -- 3    Day 07/27/18 0700 - 07/27/18 1459 200 -- -- 200 -- 200    Nanda 07/27/18 1500 - 07/27/18 2259 650 -- -- 650 -- 650    Noc 07/27/18 2300 - 07/28/18 0659 -- -- -- -- -- 0    Day 07/28/18 0700 - 07/28/18 1459 -- -- -- -- -- 0      Last Void/BM       Most Recent Value    Urine Occurrence 1 at 07/28/2018 0845    Stool Occurrence       Case Management/Discharge Planning     Case Management/Discharge Planning Flowsheet     REFERRAL INFORMATION     Support Assessment  Adequate family and caregiver support;Adequate social supports 07/28/18 1027    Did the Initial Social Work Assessment result in a Social Work Case?  Yes 07/28/18 1027   EXPECTED DISCHARGE      Admission Type  observation 07/28/18 1027   Expected Discharge Date  07/28/18 07/28/18 1027    Arrived From  skilled nursing facility;rehab facility 07/28/18 1027   FINAL RESOURCES      Reason For Consult  discharge planning 07/28/18 1027   Resources List  Skilled Nursing Facility;Transitional Care 07/28/18 1027    Primary Care Clinic Name  -- ([facility] 07/28/18 1027   ABUSE RISK SCREEN      LIVING ENVIRONMENT     QUESTION TO PATIENT:  Has a member of your family or a partner(now or in the past) intimidated, hurt, manipulated, or controlled you in any way?  no 07/26/18 2156    Lives With  spouse 07/28/18 1027   QUESTION TO PATIENT: Do you feel safe going back to the place where you are living?  yes 07/26/18 2156    Living Arrangements  house 07/28/18 1027   OBSERVATION: Is there reason to believe there has been maltreatment of a vulnerable adult (ie. Physical/Sexual/Emotional abuse, self neglect, lack of adequate food, shelter, medical care, or financial exploitation)?  no 07/26/18 2156    Provides Primary Care For  no one 07/28/18 1027   OTHER      ASSESSMENT OF FAMILY/SOCIAL SUPPORT     Are you depressed or being treated for depression?  Yes 07/27/18 0248    Who is your support system?  ;Children 07/28/18 1027    HOMICIDE RISK      Description of Support System  Supportive;Involved 07/28/18 2314   Feels Like Hurting Others  no 07/26/18 6456

## 2018-07-26 NOTE — PLAN OF CARE
Problem: Patient Care Overview  Goal: Plan of Care/Patient Progress Review  Outcome: Improving  Patient up to bathroom voiding good amounts . Patient does want pain med every 3 hours. States just can't get rid of the pain .patient requested not to get up to chair for dinner. Wanted to sit up in bed. Patient is using the CPM and patient states she thinks it is helping  minimize the pain. Patient dressing is dry and intact. Patient has no other questions has call light in reach. Can let her needs be known and all questions answered.

## 2018-07-26 NOTE — DISCHARGE SUMMARY
[facility] Orthopedics Discharge Summary                                  [facility]     GINGER CHRISTIANSON [id number]   Age: 62 year old  PCP: Astrid Marin, [phone] 1955     Date of Admission:  7/23/2018  Date of Discharge::  7/26/2018  Discharge Physician:  Mary Cannon    Code status:  Full Code    Admission Information:  Admission Diagnosis:  right knee osteoarthritis  Right knee DJD    Post-Operative Day: 3 Days Post-Op     Reason for admission:  The patient was admitted for the following:Procedure(s) (LRB):  ARTHROPLASTY KNEE (Right)    Principal Problem:    S/P total knee arthroplasty, right  Active Problems:    Major depression in partial remission (H)    Hyperlipidemia LDL goal <130    History of hepatitis B    Right knee DJD      Allergies:  Penicillins and Sulfa drugs    Following the procedure noted above the patient was transferred to the post-op floor and started on:    Therapy:  physical therapy and occupational therapy  Anticoagulation Plan: Lovenox inpatient and then  mg daily at discharge  for 42 days  Pain Management: oxycodone and tylenol  Weight bearing status: Weight bearing as tolerated     The patient was followed and co-managed by the hospitalist service during the inpatient treatment course  Complications:  None  Consultations:  None     Pertinent Labs   Lab Results: personally reviewed.     Recent Labs   Lab Test  07/26/18   0551  07/25/18   0530  07/24/18   0556  07/20/18   1320  07/15/15   1532  03/07/12   1136  06/30/11   1131   HGB   --   9.6*  10.9*  13.5  14.2  14.1  13.8   HCT   --    --    --   40.7  42.8  42.5  41.8   MCV   --    --    --   93  93  89  91   PLT  223   --   253  323  311  311  298   NA   --    --    --    --   138  139  141          Discharge Information:  Condition at discharge: Stable  Discharge destination:  Discharged to rehabilitation facility     Medications at discharge:  Current Discharge Medication List      START  taking these medications    Details   acetaminophen (TYLENOL) 325 MG tablet Take 2 tablets (650 mg) by mouth every 4 hours as needed for mild pain  Qty: 100 tablet, Refills: 0    Associated Diagnoses: S/P total knee arthroplasty, right      aspirin 325 MG EC tablet Take 1 tablet (325 mg) by mouth daily  Qty: 42 tablet, Refills: 0    Associated Diagnoses: S/P total knee arthroplasty, right      oxyCODONE IR (ROXICODONE) 5 MG tablet Take 1-2 tablets (5-10 mg) by mouth every 3 hours as needed for other (pain control or improvement in physical function. Hold dose for analgesic side effects.)  Qty: 60 tablet, Refills: 0    Associated Diagnoses: S/P total knee arthroplasty, right      senna-docusate (SENOKOT-S;PERICOLACE) 8.6-50 MG per tablet Take 1 tablet by mouth 2 times daily  Qty: 100 tablet, Refills: 0    Associated Diagnoses: S/P total knee arthroplasty, right         CONTINUE these medications which have NOT CHANGED    Details   Biotin 1 MG CAPS Take 1 capsule by mouth daily       Calcium 250 MG CAPS Take 1 capsule by mouth daily       citalopram (CELEXA) 20 MG tablet Take 1/2 tablet (10 mg) for 1-2 weeks, then increase to 1 tablet orally daily  Qty: 30 tablet, Refills: 1    Associated Diagnoses: Major depressive disorder, recurrent episode, in partial remission (H)      TURMERIC CURCUMIN PO Take 1 capsule by mouth daily                        Follow-Up Care:  Patient should be seen in the office in 10-14 days by the Orthopedic Surgeon/Physician Assistant.  Call [phone] for appointment or questions.    Mary Cannon

## 2018-07-27 ENCOUNTER — APPOINTMENT (OUTPATIENT)
Dept: ULTRASOUND IMAGING | Facility: CLINIC | Age: 63
End: 2018-07-27
Attending: PHYSICIAN ASSISTANT
Payer: COMMERCIAL

## 2018-07-27 ENCOUNTER — AMBULATORY - HEALTHEAST (OUTPATIENT)
Dept: ADMINISTRATIVE | Facility: CLINIC | Age: 63
End: 2018-07-27

## 2018-07-27 PROBLEM — R50.82 POSTOPERATIVE FEVER: Status: ACTIVE | Noted: 2018-07-27

## 2018-07-27 LAB
ALBUMIN UR-MCNC: NEGATIVE MG/DL
ANION GAP SERPL CALCULATED.3IONS-SCNC: 2 MMOL/L (ref 3–14)
APPEARANCE UR: CLEAR
BASOPHILS # BLD AUTO: 0 10E9/L (ref 0–0.2)
BASOPHILS NFR BLD AUTO: 0.5 %
BILIRUB UR QL STRIP: NEGATIVE
BUN SERPL-MCNC: 9 MG/DL (ref 7–30)
CALCIUM SERPL-MCNC: 8.7 MG/DL (ref 8.5–10.1)
CHLORIDE SERPL-SCNC: 106 MMOL/L (ref 94–109)
CO2 SERPL-SCNC: 33 MMOL/L (ref 20–32)
COLOR UR AUTO: YELLOW
CREAT SERPL-MCNC: 0.73 MG/DL (ref 0.52–1.04)
DIFFERENTIAL METHOD BLD: ABNORMAL
EOSINOPHIL # BLD AUTO: 0.3 10E9/L (ref 0–0.7)
EOSINOPHIL NFR BLD AUTO: 3.8 %
ERYTHROCYTE [DISTWIDTH] IN BLOOD BY AUTOMATED COUNT: 13.6 % (ref 10–15)
GFR SERPL CREATININE-BSD FRML MDRD: 81 ML/MIN/1.7M2
GLUCOSE SERPL-MCNC: 106 MG/DL (ref 70–99)
GLUCOSE UR STRIP-MCNC: NEGATIVE MG/DL
HCT VFR BLD AUTO: 30 % (ref 35–47)
HGB BLD-MCNC: 9.8 G/DL (ref 11.7–15.7)
HGB UR QL STRIP: NEGATIVE
IMM GRANULOCYTES # BLD: 0 10E9/L (ref 0–0.4)
IMM GRANULOCYTES NFR BLD: 0.3 %
KETONES UR STRIP-MCNC: NEGATIVE MG/DL
LEUKOCYTE ESTERASE UR QL STRIP: NEGATIVE
LYMPHOCYTES # BLD AUTO: 1.9 10E9/L (ref 0.8–5.3)
LYMPHOCYTES NFR BLD AUTO: 28.7 %
MCH RBC QN AUTO: 30.9 PG (ref 26.5–33)
MCHC RBC AUTO-ENTMCNC: 32.7 G/DL (ref 31.5–36.5)
MCV RBC AUTO: 95 FL (ref 78–100)
MONOCYTES # BLD AUTO: 0.6 10E9/L (ref 0–1.3)
MONOCYTES NFR BLD AUTO: 9.5 %
NEUTROPHILS # BLD AUTO: 3.7 10E9/L (ref 1.6–8.3)
NEUTROPHILS NFR BLD AUTO: 57.2 %
NITRATE UR QL: NEGATIVE
NRBC # BLD AUTO: 0 10*3/UL
NRBC BLD AUTO-RTO: 0 /100
PH UR STRIP: 7 PH (ref 5–7)
PLATELET # BLD AUTO: 225 10E9/L (ref 150–450)
POTASSIUM SERPL-SCNC: 4.5 MMOL/L (ref 3.4–5.3)
PROCALCITONIN SERPL-MCNC: 0.06 NG/ML
RBC # BLD AUTO: 3.17 10E12/L (ref 3.8–5.2)
RBC #/AREA URNS AUTO: 1 /HPF (ref 0–2)
SODIUM SERPL-SCNC: 141 MMOL/L (ref 133–144)
SOURCE: NORMAL
SP GR UR STRIP: 1.01 (ref 1–1.03)
UROBILINOGEN UR STRIP-MCNC: 0 MG/DL (ref 0–2)
WBC # BLD AUTO: 6.5 10E9/L (ref 4–11)
WBC #/AREA URNS AUTO: 2 /HPF (ref 0–5)

## 2018-07-27 PROCEDURE — 25000128 H RX IP 250 OP 636: Performed by: PHYSICIAN ASSISTANT

## 2018-07-27 PROCEDURE — 36415 COLL VENOUS BLD VENIPUNCTURE: CPT | Performed by: INTERNAL MEDICINE

## 2018-07-27 PROCEDURE — 99218 ZZC INITIAL OBSERVATION CARE,LEVL I: CPT | Performed by: PHYSICIAN ASSISTANT

## 2018-07-27 PROCEDURE — 25000132 ZZH RX MED GY IP 250 OP 250 PS 637: Performed by: PHYSICIAN ASSISTANT

## 2018-07-27 PROCEDURE — 96372 THER/PROPH/DIAG INJ SC/IM: CPT

## 2018-07-27 PROCEDURE — 96376 TX/PRO/DX INJ SAME DRUG ADON: CPT

## 2018-07-27 PROCEDURE — 85025 COMPLETE CBC W/AUTO DIFF WBC: CPT | Performed by: INTERNAL MEDICINE

## 2018-07-27 PROCEDURE — 80048 BASIC METABOLIC PNL TOTAL CA: CPT | Performed by: INTERNAL MEDICINE

## 2018-07-27 PROCEDURE — 99207 ZZC CDG-HISTORY COMP: MEETS EXP. PROB FOCUSED- DOWN CODED LACK OF PFSH: CPT | Performed by: PHYSICIAN ASSISTANT

## 2018-07-27 PROCEDURE — 25000128 H RX IP 250 OP 636: Performed by: STUDENT IN AN ORGANIZED HEALTH CARE EDUCATION/TRAINING PROGRAM

## 2018-07-27 PROCEDURE — 84145 PROCALCITONIN (PCT): CPT | Performed by: PHYSICIAN ASSISTANT

## 2018-07-27 PROCEDURE — 25000132 ZZH RX MED GY IP 250 OP 250 PS 637: Performed by: STUDENT IN AN ORGANIZED HEALTH CARE EDUCATION/TRAINING PROGRAM

## 2018-07-27 PROCEDURE — 93971 EXTREMITY STUDY: CPT | Mod: RT

## 2018-07-27 PROCEDURE — G0378 HOSPITAL OBSERVATION PER HR: HCPCS

## 2018-07-27 RX ORDER — ONDANSETRON 2 MG/ML
4 INJECTION INTRAMUSCULAR; INTRAVENOUS EVERY 6 HOURS PRN
Status: DISCONTINUED | OUTPATIENT
Start: 2018-07-27 | End: 2018-07-27

## 2018-07-27 RX ORDER — POLYETHYLENE GLYCOL 3350 17 G/17G
17 POWDER, FOR SOLUTION ORAL DAILY PRN
Status: DISCONTINUED | OUTPATIENT
Start: 2018-07-27 | End: 2018-07-28 | Stop reason: HOSPADM

## 2018-07-27 RX ORDER — HYDROMORPHONE HYDROCHLORIDE 1 MG/ML
.3-.5 INJECTION, SOLUTION INTRAMUSCULAR; INTRAVENOUS; SUBCUTANEOUS
Status: DISCONTINUED | OUTPATIENT
Start: 2018-07-27 | End: 2018-07-28 | Stop reason: HOSPADM

## 2018-07-27 RX ORDER — ASPIRIN 325 MG
325 TABLET ORAL DAILY
Status: DISCONTINUED | OUTPATIENT
Start: 2018-07-27 | End: 2018-07-27

## 2018-07-27 RX ORDER — OXYCODONE HYDROCHLORIDE 5 MG/1
5 TABLET ORAL
Status: DISCONTINUED | OUTPATIENT
Start: 2018-07-27 | End: 2018-07-28 | Stop reason: HOSPADM

## 2018-07-27 RX ORDER — CITALOPRAM HYDROBROMIDE 20 MG/1
20 TABLET ORAL DAILY
COMMUNITY
End: 2021-05-11

## 2018-07-27 RX ORDER — NALOXONE HYDROCHLORIDE 0.4 MG/ML
.1-.4 INJECTION, SOLUTION INTRAMUSCULAR; INTRAVENOUS; SUBCUTANEOUS
Status: DISCONTINUED | OUTPATIENT
Start: 2018-07-27 | End: 2018-07-27

## 2018-07-27 RX ORDER — ACETAMINOPHEN 325 MG/1
325-650 TABLET ORAL EVERY 6 HOURS PRN
Status: ON HOLD | COMMUNITY
End: 2018-07-28

## 2018-07-27 RX ORDER — ASPIRIN 325 MG
325 TABLET ORAL DAILY
COMMUNITY
End: 2021-05-11

## 2018-07-27 RX ORDER — AMOXICILLIN 250 MG
1 CAPSULE ORAL 2 TIMES DAILY
COMMUNITY
End: 2021-05-11

## 2018-07-27 RX ORDER — SODIUM CHLORIDE 9 MG/ML
INJECTION, SOLUTION INTRAVENOUS CONTINUOUS
Status: DISCONTINUED | OUTPATIENT
Start: 2018-07-27 | End: 2018-07-27

## 2018-07-27 RX ORDER — ONDANSETRON 4 MG/1
4 TABLET, ORALLY DISINTEGRATING ORAL EVERY 6 HOURS PRN
Status: DISCONTINUED | OUTPATIENT
Start: 2018-07-27 | End: 2018-07-28 | Stop reason: HOSPADM

## 2018-07-27 RX ORDER — NICOTINE POLACRILEX 2 MG
1 GUM BUCCAL DAILY
COMMUNITY
End: 2021-05-11

## 2018-07-27 RX ORDER — AMOXICILLIN 250 MG
1 CAPSULE ORAL 2 TIMES DAILY
Status: DISCONTINUED | OUTPATIENT
Start: 2018-07-27 | End: 2018-07-28 | Stop reason: HOSPADM

## 2018-07-27 RX ORDER — ONDANSETRON 2 MG/ML
4 INJECTION INTRAMUSCULAR; INTRAVENOUS EVERY 6 HOURS PRN
Status: DISCONTINUED | OUTPATIENT
Start: 2018-07-27 | End: 2018-07-28 | Stop reason: HOSPADM

## 2018-07-27 RX ORDER — ONDANSETRON 4 MG/1
4 TABLET, ORALLY DISINTEGRATING ORAL EVERY 6 HOURS PRN
Status: DISCONTINUED | OUTPATIENT
Start: 2018-07-27 | End: 2018-07-27

## 2018-07-27 RX ORDER — CITALOPRAM HYDROBROMIDE 20 MG/1
20 TABLET ORAL DAILY
Status: DISCONTINUED | OUTPATIENT
Start: 2018-07-27 | End: 2018-07-28 | Stop reason: HOSPADM

## 2018-07-27 RX ORDER — VANCOMYCIN HYDROCHLORIDE 1 G/200ML
1000 INJECTION, SOLUTION INTRAVENOUS EVERY 12 HOURS
Status: DISCONTINUED | OUTPATIENT
Start: 2018-07-27 | End: 2018-07-27

## 2018-07-27 RX ORDER — NALOXONE HYDROCHLORIDE 0.4 MG/ML
.1-.4 INJECTION, SOLUTION INTRAMUSCULAR; INTRAVENOUS; SUBCUTANEOUS
Status: DISCONTINUED | OUTPATIENT
Start: 2018-07-27 | End: 2018-07-28 | Stop reason: HOSPADM

## 2018-07-27 RX ADMIN — SODIUM CHLORIDE: 9 INJECTION, SOLUTION INTRAVENOUS at 02:54

## 2018-07-27 RX ADMIN — CITALOPRAM HYDROBROMIDE 20 MG: 20 TABLET ORAL at 08:34

## 2018-07-27 RX ADMIN — HYDROMORPHONE HYDROCHLORIDE 0.5 MG: 1 INJECTION, SOLUTION INTRAMUSCULAR; INTRAVENOUS; SUBCUTANEOUS at 18:44

## 2018-07-27 RX ADMIN — OXYCODONE HYDROCHLORIDE 5 MG: 5 TABLET ORAL at 11:55

## 2018-07-27 RX ADMIN — ENOXAPARIN SODIUM 40 MG: 40 INJECTION SUBCUTANEOUS at 10:58

## 2018-07-27 RX ADMIN — OXYCODONE HYDROCHLORIDE 5 MG: 5 TABLET ORAL at 08:49

## 2018-07-27 RX ADMIN — VANCOMYCIN HYDROCHLORIDE 1000 MG: 1 INJECTION, SOLUTION INTRAVENOUS at 14:06

## 2018-07-27 RX ADMIN — VANCOMYCIN HYDROCHLORIDE 1000 MG: 1 INJECTION, SOLUTION INTRAVENOUS at 01:22

## 2018-07-27 RX ADMIN — HYDROMORPHONE HYDROCHLORIDE 0.5 MG: 1 INJECTION, SOLUTION INTRAMUSCULAR; INTRAVENOUS; SUBCUTANEOUS at 01:25

## 2018-07-27 RX ADMIN — ASPIRIN 325 MG ORAL TABLET 325 MG: 325 PILL ORAL at 08:34

## 2018-07-27 RX ADMIN — SENNOSIDES AND DOCUSATE SODIUM 1 TABLET: 8.6; 5 TABLET ORAL at 08:34

## 2018-07-27 RX ADMIN — OXYCODONE HYDROCHLORIDE 5 MG: 5 TABLET ORAL at 06:05

## 2018-07-27 RX ADMIN — OXYCODONE HYDROCHLORIDE 5 MG: 5 TABLET ORAL at 14:55

## 2018-07-27 RX ADMIN — OXYCODONE HYDROCHLORIDE 5 MG: 5 TABLET ORAL at 03:01

## 2018-07-27 RX ADMIN — OXYCODONE HYDROCHLORIDE 5 MG: 5 TABLET ORAL at 18:06

## 2018-07-27 RX ADMIN — SENNOSIDES AND DOCUSATE SODIUM 1 TABLET: 8.6; 5 TABLET ORAL at 21:08

## 2018-07-27 RX ADMIN — OXYCODONE HYDROCHLORIDE 5 MG: 5 TABLET ORAL at 21:11

## 2018-07-27 RX ADMIN — HYDROMORPHONE HYDROCHLORIDE 0.5 MG: 1 INJECTION, SOLUTION INTRAMUSCULAR; INTRAVENOUS; SUBCUTANEOUS at 23:19

## 2018-07-27 ASSESSMENT — PAIN DESCRIPTION - DESCRIPTORS: DESCRIPTORS: ACHING

## 2018-07-27 NOTE — ED PROVIDER NOTES
History     Chief Complaint   Patient presents with     Knee Pain     right, post op day 3     HPI  Preeti Sherman is a 62 year old female who presents from transitional care for evaluation of fever with increasing right knee swelling and pain status post arthroplasty.  Records confirm the patient had right knee arthroplasty performed by orthopedic surgeon Dr. Brady on Monday, 7/23/18.  She maintains that she felt relatively well for the first day or so but over the past 2 days had gradually increasing chills and right knee swelling.  Today she spiked a fever of 103 F so was transferred to the department for evaluation.  Patient has been receiving medications including oxycodone and acetaminophen with little relief.  She denies headache, sore throat, chest pain, cough, abdominal pain, nausea/vomiting, diarrhea, dysuria, or skin rashes.  She is not currently on antibiotics.  No known exacerbating or alleviating factors.    Problem List:    There are no active problems to display for this patient.       Past Medical History:    No past medical history on file.    Past Surgical History:    No past surgical history on file.    Family History:    No family history on file.    Social History:  Marital Status:   [2]  Social History   Substance Use Topics     Smoking status: Not on file     Smokeless tobacco: Not on file     Alcohol use Not on file        Medications:      No current outpatient prescriptions on file.      Review of Systems  Constitutional: Positive for fever with chills.  HENT:  Negative oral or throat pain.   Cardiovascular:  Negative for chest pain.  Respiratory:  Negative for cough or shortness of breath.  Gastrointestinal:  Negative for abdominal pain, nausea, vomiting, or diarrhea.  Genitourinary:  Negative for dysuria.  Musculoskeletal: Positive for right knee pain and swelling postoperatively.  Negative for neck stiffness or back pain.  Neurological:  Negative for headache.  Skin:  Negative  for rash.    All others reviewed and are negative.      Physical Exam   BP: 112/79  Heart Rate: 94  Temp: 101.1  F (38.4  C)  Resp: 20  Weight: 59 kg (130 lb)  SpO2: 97 %      Physical Exam  Constitutional:  Well developed, well nourished.  Appears nontoxic moderately uncomfortable secondary to right knee pain.  HENT:  Normocephalic and atraumatic.  Symmetric in appearance.  Eyes:  Conjunctivae are normal.  Neck:  Neck supple.  Cardiovascular:  No cyanosis.  RRR.  No audible murmurs noted.  No lower extremity edema or asymmetry.   Respiratory:  Effort normal without sign of respiratory distress.  CTAB without diminished regions.  No wheezing, rhonchi, or crackles.  Gastrointestinal:  Soft, nondistended abdomen.  Nontender and without guarding.  No rigidity or rebound tenderness.  Negative Lees's sign.  Negative McBurney's point.   Genitourinary:  Noncontributory.   Musculoskeletal: Postoperative incision of her anterior active right knee appears clean, dry, intact and without dehiscence.  Moderate warmth, swelling, and tenderness around the knee, medial > lateral.    Neurological:  Patient is alert.  Skin:  Skin is warm and dry.  Psychiatric:  Normal mood and affect.      ED Course     ED Course     Procedures               Critical Care time:  none               Results for orders placed or performed during the hospital encounter of 07/26/18 (from the past 24 hour(s))   CBC with platelets differential   Result Value Ref Range    WBC 9.2 4.0 - 11.0 10e9/L    RBC Count 3.66 (L) 3.8 - 5.2 10e12/L    Hemoglobin 11.1 (L) 11.7 - 15.7 g/dL    Hematocrit 34.3 (L) 35.0 - 47.0 %    MCV 94 78 - 100 fl    MCH 30.3 26.5 - 33.0 pg    MCHC 32.4 31.5 - 36.5 g/dL    RDW 13.7 10.0 - 15.0 %    Platelet Count 262 150 - 450 10e9/L    Diff Method Automated Method     % Neutrophils 62.6 %    % Lymphocytes 24.7 %    % Monocytes 9.1 %    % Eosinophils 2.8 %    % Basophils 0.5 %    % Immature Granulocytes 0.3 %    Nucleated RBCs 0 0 /100     Absolute Neutrophil 5.8 1.6 - 8.3 10e9/L    Absolute Lymphocytes 2.3 0.8 - 5.3 10e9/L    Absolute Monocytes 0.8 0.0 - 1.3 10e9/L    Absolute Eosinophils 0.3 0.0 - 0.7 10e9/L    Absolute Basophils 0.1 0.0 - 0.2 10e9/L    Abs Immature Granulocytes 0.0 0 - 0.4 10e9/L    Absolute Nucleated RBC 0.0    Comprehensive metabolic panel   Result Value Ref Range    Sodium 134 133 - 144 mmol/L    Potassium 3.9 3.4 - 5.3 mmol/L    Chloride 100 94 - 109 mmol/L    Carbon Dioxide 30 20 - 32 mmol/L    Anion Gap 4 3 - 14 mmol/L    Glucose 101 (H) 70 - 99 mg/dL    Urea Nitrogen 10 7 - 30 mg/dL    Creatinine 0.81 0.52 - 1.04 mg/dL    GFR Estimate 71 >60 mL/min/1.7m2    GFR Estimate If Black 86 >60 mL/min/1.7m2    Calcium 9.2 8.5 - 10.1 mg/dL    Bilirubin Total 0.5 0.2 - 1.3 mg/dL    Albumin 3.3 (L) 3.4 - 5.0 g/dL    Protein Total 7.1 6.8 - 8.8 g/dL    Alkaline Phosphatase 71 40 - 150 U/L    ALT 26 0 - 50 U/L    AST 21 0 - 45 U/L   INR   Result Value Ref Range    INR 0.90 0.86 - 1.14   Lactic acid whole blood   Result Value Ref Range    Lactic Acid 1.0 0.7 - 2.0 mmol/L   Erythrocyte sedimentation rate auto   Result Value Ref Range    Sed Rate 37 (H) 0 - 30 mm/h   CRP Inflammation   Result Value Ref Range    CRP Inflammation 150.0 (H) 0.0 - 8.0 mg/L   Blood gas venous   Result Value Ref Range    Ph Venous 7.48 (H) 7.32 - 7.43 pH    PCO2 Venous 42 40 - 50 mm Hg    PO2 Venous 36 25 - 47 mm Hg    Bicarbonate Venous 31 (H) 21 - 28 mmol/L    Base Excess Venous 7.0 mmol/L   UA with Microscopic   Result Value Ref Range    Color Urine Yellow     Appearance Urine Clear     Glucose Urine Negative NEG^Negative mg/dL    Bilirubin Urine Negative NEG^Negative    Ketones Urine Negative NEG^Negative mg/dL    Specific Gravity Urine 1.011 1.003 - 1.035    Blood Urine Negative NEG^Negative    pH Urine 7.0 5.0 - 7.0 pH    Protein Albumin Urine Negative NEG^Negative mg/dL    Urobilinogen mg/dL 0.0 0.0 - 2.0 mg/dL    Nitrite Urine Negative NEG^Negative     Leukocyte Esterase Urine Negative NEG^Negative    Source Midstream Urine     WBC Urine 2 0 - 5 /HPF    RBC Urine 1 0 - 2 /HPF   XR Chest 2 Views    Narrative    CHEST 2 VIEWS  7/26/2018 11:27 PM     HISTORY: Fever.    COMPARISON: None.    FINDINGS: Curvilinear opacities in the medial aspects of bilateral  lung bases likely represent atelectasis or scarring. The lungs are  otherwise clear. Normal-sized cardiac silhouette.      Impression    IMPRESSION: No evidence of active cardiopulmonary disease.       Medications   lidocaine 1 % 1 mL (not administered)   lidocaine (LMX4) kit (not administered)   sodium chloride (PF) 0.9% PF flush 3 mL (not administered)   sodium chloride (PF) 0.9% PF flush 3 mL (not administered)   vancomycin (VANCOCIN) 1000 mg in dextrose 5% 200 mL PREMIX (not administered)   lactated ringers BOLUS 1,500 mL (1,500 mLs Intravenous New Bag 7/26/18 2236)   ibuprofen (ADVIL/MOTRIN) tablet 600 mg (600 mg Oral Given 7/26/18 2236)   HYDROmorphone (DILAUDID) injection 1 mg (1 mg Intravenous Given 7/26/18 2236)         Assessments & Plan (with Medical Decision Making)   Preeti Sherman is a 62 year old female who presented to the department for evaluation of increasing right knee pain and swelling with fever from local transitional care facility.  She had received antipyretics prior to EMS transport but arrived febrile with oral temperature >101 F.  Other than chills, she denies infectious symptoms such as headache, sore throat, cough, chest pain, abdominal pain, nausea/vomiting, diarrhea, or dysuria.  She has been tolerating by mouth well but has yet to have a bowel movement since her procedure.  Abdominal examination benign and clear lung sounds to auscultation.  Analysis without sign of infection.  Chest radiograph independently reviewed and no sign of infiltrate.  Low suspicion for pulmonary embolism with stable vital signs and no hypoxia, tachypnea, chest pain or or shortness of breath.  CBC is without  leukocytosis and lactate within reference range, however ESR and CRP are significantly elevated.  These are nonspecific markers in particular in the postoperative state, however there is no other identifiable infectious focus at this time.  Consulted on-call orthopedist Dr. Lai who recommended medical admission with IV antibiotic vancomycin, orthopedics to consult in the morning.  Hospitalist MILLER Reed has accepted ongoing care for the patient at this time.  No further recommendations.  Temporary transition orders were placed per hospital protocol to prevent any potential delay in patient care.    Patient has been informed of her results and the recommendation for admission.  They have verbalized an understanding, all questions answered, and they are in agreement with the plan at this time.      Disclaimer:  This note consists of symbols derived from keyboarding, dictation, and/or voice recognition software.  As a result, there may be errors in the script that have gone undetected.  Please consider this when interpreting information found in the chart.        I have reviewed the nursing notes.    I have reviewed the findings, diagnosis, plan and need for follow up with the patient.       New Prescriptions    No medications on file       Final diagnoses:   Postoperative fever   Postoperative pain of right knee       7/26/2018   [facility] EMERGENCY DEPARTMENT     Elmer Yang,   07/27/18 0057

## 2018-07-27 NOTE — ED NOTES
PT mis resting in position of comfort. States her pain is getting worse. No further changes in PT condition from previous assessments. All needs are being met and all comfort measures are being addressed. Awaiting MD cole and orders at this time.

## 2018-07-27 NOTE — PLAN OF CARE
Problem: Patient Care Overview  Goal: Individualization & Mutuality  Outcome: No Change  [place] [facility] ADMISSION NOTE    Patient admitted to room 2211 at approximately 0231 via cart from emergency room. Patient was accompanied by transport tech.     Verbal SBAR report received from Manny PALUMBO RN prior to patient arrival.     Patient ambulated to bed with stand-by assist. Patient alert and oriented X 3. Pain is controlled with current analgesics.  Medication(s) being used: narcotic analgesics including hydromorphone (Dilaudid) and oxycodone (Oxycontin, Oxyir). 0-10 Pain Scale: 9. Admission vital signs: Blood pressure 126/67, temperature 97.8  F (36.6  C), temperature source Oral, resp. rate 18, height 1.524 m (5'), weight 63.3 kg (139 lb 8.8 oz), SpO2 96 %. Patient was oriented to plan of care, call light, bed controls, tv, telephone, bathroom and visiting hours.     Risk Assessment    The following safety risks were identified during admission: fall. Yellow risk band applied: YES.     Skin Initial Assessment    This writer admitted this patient and completed a full skin assessment and Vernon score in the Adult PCS flowsheet. Appropriate interventions initiated as needed.     Secondary skin check completed by Leah JIMENEZ RN.    Incision on right knee is edematous, warm to touch, pink, no drainage noted.    Rest of skin is intact.       Vernon Risk Assessment  Sensory Perception: 4-->no impairment  Moisture: 4-->rarely moist  Activity: 3-->walks occasionally  Mobility: 3-->slightly limited  Nutrition: 3-->adequate  Friction and Shear: 3-->no apparent problem  Vernon Score: 20    Chasity Gómez

## 2018-07-27 NOTE — H&P
[facility]    History and Physical  Hospital Medicine       Date of Admission:  7/26/2018  Date of Service: 7/27/2018     Assessment & Plan   Preeti Sherman is a 62 year old female with past medical history of depression, hyperlipidemia, and hepatitis B who presents with fever and increased swelling in her surgical knee, discharged 7/26/2018 after right total knee arthroplasty.    Postoperative fever  POD 4 Right Total Knee Arthroplasty  Patient discharged yesterday after right total knee arthroplasty. Reportedly febrile up to 103, presented with fever of 101.1. Patient with chills otherwise no localizing symptoms. Increased knee swelling. Stable calf and knee pain. WBC normal. ESR and CRP elevated, though difficult to assess significance given recent surgery. , ESR 37. UA not consistent with infection. No infiltrate on CXR. ED spoke with ortho in ED who recommended admission and initiation of IV vancomycin. Ortho evaluated the patient this morning, low suspicion for post-operative infection. Agree as no increase in pain at site, WBC normal, no erythema noted. Though she does have increase swelling and subtle warmth. DVT possible given calf pain with fever, US ordered and negative. Low suspicion for PE without tachycardia, pain or shortness of breath, though at risk with recent surgery and did have very brief episodes of hypoxemia overnight. Hemarthrosis also possible given increase in swelling. Atelectasis also possible. No current localizing infectious symptoms on review of symptoms or signs exam.  - ortho consult, following  - pain management: acetaminophen and oxycodone  - continue enoxaparin, per orthopedic service  - discontinue IV vancomycin, low suspicion   - follow pending blood cultures  - add on procalcitonin  - AM CBC  - monitor for further fevers or localization of symptoms    Post-operative anemia  Hgb 9.8. Last check, 9.6 prior to discharge. Though she was 11.1 on  presentation and repeat is down just over 1 point. She did receive 1.5 L of fluids, so possibly associated with dilution. Small amount of blood on sheet near knee, no acute bleeding from incision. Hemarthrosis, knee with increased swelling, also noted on orthopedic note. Overall hemoglobin is stable from discharge.  - AM CBC  - monitor for signs of bleeding    Major depression in partial remission   Stable.   - continue home Celexa      Hyperlipidemia LDL goal <130  Per chart review. Not currently on any medications for this.  - Continue outpatient management      History of hepatitis B  Per chart review. Follows at [facility], records not available.    FEN:  - Not indicated, encourage oral intake  - Will monitor electrolytes and replace as needed  - regular adult diet    DVT Prophylaxis: Enoxaparin (Lovenox) subcutaneous, per orthopedic service  Code Status: Full Code    Disposition: Anticipate discharge in 1-2 days once patient improving. Appropriate for observation level care    I have discussed patient and formulated plan with Dr. Ted Segura.    Astrid Raman PA-C  [facility] Medicine        Primary Care Physician   Clinic, [facility] [phone]    History is obtained from the patient, ED notes and review of the EMR.    Past Medical History    No past medical history on file.  Patient Active Problem List    Diagnosis Date Noted     Postoperative fever 07/27/2018     Priority: Medium      Past Surgical History   Total knee replacement  Urethroplasty  Hysterectomy  Appendectomy  Bladder Surgery, tumor removed  Foot surgery  Left rotator cuff repaired    History of Present Illness   Preeti Sherman is a 62 year old female who presents with fever from TCU.    Discharged yesterday from hospital after right total knee replacement. Was feeling well when she left the hospital. Still having right knee pain as well as right sided calf pain, which started initially after surgery. Described as achy pain. Walking and pushing  on it makes it worse. Pain medications have made it better. Currently pain in calf and knee is about the same as when she left. Feels like her knee though is somewhat more swollen than when she left.    States she felt chilled when she was at the TCU, but did not feel feverish. No cough, shortness of breath, sore throat or urinary symptoms. No bowel movement since surgery, is passing gas.     Patient denies lightheadedness, dizziness, cough, congestion, rhinorrhea, sore throat, shortness of breath, palpitations, chest pain, abdominal pain, nausea, vomiting, diarrhea, changes in urination (dysuria, changes in frequency/urgency), rash or wounds.    Prior to Admission Medications   Prior to Admission Medications   Prescriptions Last Dose Informant Patient Reported? Taking?   Biotin 1 MG CAPS   Yes Yes   Sig: Take 1 mg by mouth daily   Calcium 250 MG CAPS   Yes Yes   Sig: Take 250 mg by mouth daily   OXYCODONE HCL PO 7/27/2018 at 0315  Yes Yes   Sig: Take 5-10 mg by mouth every 3 hours as needed (pain)   TURMERIC CURCUMIN PO   Yes Yes   Sig: Take 1 capsule by mouth daily   acetaminophen (TYLENOL) 325 MG tablet 7/26/2018 at pm  Yes Yes   Sig: Take 325-650 mg by mouth every 6 hours as needed for mild pain   aspirin 325 MG tablet 7/26/2018 at Unknown time  Yes Yes   Sig: Take 325 mg by mouth daily   citalopram (CELEXA) 20 MG tablet 7/26/2018 at Unknown time  Yes Yes   Sig: Take 20 mg by mouth daily   senna-docusate (SENOKOT-S;PERICOLACE) 8.6-50 MG per tablet 7/26/2018 at pm  Yes Yes   Sig: Take 1 tablet by mouth 2 times daily      Facility-Administered Medications: None     Allergies   Allergies   Allergen Reactions     Penicillins Hives     Sulfa Drugs Hives     Family History    No family history on file.      Social History   Social History     Social History     Marital status:      Spouse name: N/A     Number of children: N/A     Years of education: N/A     Occupational History     Not on file.     Social  History Main Topics     Smoking status: Never Smoker     Smokeless tobacco: Not on file     Alcohol use Yes      Comment: socially     Drug use: No     Sexual activity: Not on file     Other Topics Concern     Not on file     Social History Narrative    Primarily lives in [place]. Often comes to [place] for the summer for up to 6 weeks. Family lives in [place].      Review of Systems   The 10 point Review of Systems is negative other than noted in the HPI or here.     Physical Exam   /72 (BP Location: Right arm)  Temp 97.8  F (36.6  C) (Oral)  Resp 18  Ht 1.524 m (5')  Wt 63.3 kg (139 lb 8.8 oz)  SpO2 99%  BMI 27.25 kg/m2     Weight: 139 lbs 8.82 oz Body mass index is 27.25 kg/(m^2).     Constitutional: Patient is lying down comfortablly on exam. Alert & oriented. Pleasant & cooperative. No apparent distress. Appears nontoxic. Appears stated age.  Eyes: Sclera are anicteric, EOMI, PEERL  HENT: Normocephalic. Atraumatic. Oropharynx is clear and moist.   Lymph/Hematologic: No epitrochlear, axillary, preauricular, postauricular, occipital, sub-mandibular, tonsillar, sub-mental, anterior or posterior cervical, or supraclavicular lymphadenopathy is appreciated.  Cardiovascular: Regular rate and normal rhythm. No murmur, rubs or gallops noted. Radial pulses are 2+ bilaterally. Distal pulses are intact. Mild lower extremity edema of the right lower extremity.  Respiratory: No accessory muscle usage. Speaking in full sentences. Clear to auscultation bilaterally without wheezes, crackles or rhonchi.  GI: Normal bowel sounds present, soft, non-tender, non-distended. No rebound or guarding. No CVA tenderness.  Genitourinary: Deferred  Musculoskeletal: Normal muscle bulk and tone. Moves all extremities appropriately with somewhat decreased ROM of right lower extremity due to pain Right calf tender to palpation diffusely, no real localization. Right knee swelling with slight warmth. No erythema. Distal  pulses intact. Cap refill < 2. Sensation intact.  Skin: Warm and dry, no rashes.   Neurologic: Neck supple. Cranial nerves 3-12 are grossly intact.  is symmetric.     Data   Data reviewed today:     Recent Labs  Lab 07/27/18  0536 07/26/18  2210   WBC 6.5 9.2   HGB 9.8* 11.1*   MCV 95 94    262   INR  --  0.90    134   POTASSIUM 4.5 3.9   CHLORIDE 106 100   CO2 33* 30   BUN 9 10   CR 0.73 0.81   ANIONGAP 2* 4   LATOYA 8.7 9.2   * 101*   ALBUMIN  --  3.3*   PROTTOTAL  --  7.1   BILITOTAL  --  0.5   ALKPHOS  --  71   ALT  --  26   AST  --  21     Recent Results (from the past 24 hour(s))   XR Chest 2 Views    Narrative    CHEST 2 VIEWS  7/26/2018 11:27 PM     HISTORY: Fever.    COMPARISON: None.    FINDINGS: Curvilinear opacities in the medial aspects of bilateral  lung bases likely represent atelectasis or scarring. The lungs are  otherwise clear. Normal-sized cardiac silhouette.      Impression    IMPRESSION: No evidence of active cardiopulmonary disease.    JUANCARLOS VALDIVIA MD   US Lower Extremity Venous Duplex Right    Narrative    US LOWER EXTREMITY VENOUS DUPLEX RIGHT 7/27/2018 9:54 AM    HISTORY: Right lower extremity edema with calf tenderness. Swelling.    TECHNIQUE: Color flow and doppler spectral waveform analysis of deep  venous structures is performed.  Imaged deep venous structures of the  right lower extremity include the right common femoral vein, femoral  vein, popliteal vein, and visualized posterior deep calf veins.    COMPARISON: None.    FINDINGS: No DVT is demonstrated. There is a complex Baker's cyst in  the popliteal fossa that measures 4.0 x 2.8 x 1.4 cm.      Impression    IMPRESSION:  1. Negative right lower extremity venous Doppler for DVT.  2. Perez's cyst.    VARUN MADDOX MD     I personally reviewed no images or EKG's today.    I have discussed patient and formulated plan with Dr. Ted Segura.    Chart documentation with keystrokes and/or Dragon voice recognition  software. Although reviewed after completion, some word and grammatical error may remain.  Astrid Raman PA-C  [facility] Medicine

## 2018-07-27 NOTE — ED NOTES
Preeti Sherman is a 62 year old female who presents to the ED VIA EMS from [facility] for fever and possibly infected wound. PT is post op day 3 from a total right knee replacement. States today she was discharged from the hospital and sent to rehab. States upon arrival to to rehab she noted the wound to be more painful, and hot to touch and PT noted to be febrile.   At this time wound is noted to have no drainage and hot to touch. PT is noted to be febrile. PT is A&OX4, appears to be in pain. All needs are being assessed and will be met and all comfort measures are being addressed. MD at bedside and awaiting orders at this time.

## 2018-07-27 NOTE — PROGRESS NOTES
Secondary skin check completed and agree with recorded assessment and johnson scale document by admitting nurse.

## 2018-07-27 NOTE — CONSULTS
[facility] Consultation    Consultation - [facility]  Level of consult: Consult, follow and place orders    Preeti Sherman,  1955, MRN [id number]     Admitting Dx: Postoperative fever [R50.82]  Postoperative pain of right knee [M25.561, G89.18]  Fever, unspecified fever cause [R50.9]     PCP: [facility], [phone]     Code status:  No Order     Extended Emergency Contact Information  Primary Emergency Contact: AVTAR SHERMAN   [facility]  Home Phone: [phone]  Mobile Phone: [phone]  Relation: Spouse     Assessment:  Post operative fever, normalized since admission. R knee edema/ erythema with pain and tenderness to calf. Low suspicion for prosthetic joint infection, higher suspicion for RLE DVT. Higher suspicion for spontaneous hemarthrosis given anticoagulation and recent surgery.    Plan:  Venous duplex US to RLE to r/o DVT. Will change to Lovenox while inpatient pending US results. If positive, begin coumadin for DVT. If negative, monitor for signs of infection, drainage or erythema. At this point, there is not enough evidence to support joint infection but this could change.     Active Problems:    Postoperative fever       Chief Complaint  Fever     HPI  We have been requested to evaluate Preeti Sherman who is a 62 year old year old female for post operative fever. I evaluated the patient for her recent TKA by Dr. rodriguez on . She presents with new onset effusion to the R knee overnight and calf tenderness with mild lower extremity edema. She is uncertain if she has any mechanism of twisting to facilitate hemarthrosis. Uncertain if her pain is any worse today with associated fever.      History is obtained from the patient     Past Medical History  No past medical history on file.    Surgical History  No past surgical history on file.     Social History  Social History     Social History     Marital status:      Spouse name: N/A     Number of  children: N/A     Years of education: N/A     Occupational History     Not on file.     Social History Main Topics     Smoking status: Not on file     Smokeless tobacco: Not on file     Alcohol use Not on file     Drug use: Not on file     Sexual activity: Not on file     Other Topics Concern     Not on file     Social History Narrative       Family History  No family history on file.     Allergies:  Penicillins and Sulfa drugs      Current Medications:  Current Facility-Administered Medications   Medication     citalopram (celeXA) tablet 20 mg     enoxaparin (LOVENOX) injection 40 mg     HYDROmorphone (PF) (DILAUDID) injection 0.3-0.5 mg     lidocaine (LMX4) kit     lidocaine 1 % 1 mL     naloxone (NARCAN) injection 0.1-0.4 mg     ondansetron (ZOFRAN-ODT) ODT tab 4 mg    Or     ondansetron (ZOFRAN) injection 4 mg     oxyCODONE IR (ROXICODONE) tablet 5 mg     senna-docusate (SENOKOT-S;PERICOLACE) 8.6-50 MG per tablet 1 tablet     sodium chloride (PF) 0.9% PF flush 3 mL     sodium chloride (PF) 0.9% PF flush 3 mL     sodium chloride 0.9% infusion     vancomycin (VANCOCIN) 1000 mg in dextrose 5% 200 mL PREMIX       Review of Systems:  The Review of Systems is negative other than noted in the HPI    Physical Exam:  Temp:  [97.8  F (36.6  C)-101.1  F (38.4  C)] 97.8  F (36.6  C)  Heart Rate:  [67-94] 67  Resp:  [18-20] 18  BP: ()/(54-80) 113/72  SpO2:  [92 %-99 %] 99 %    MSK: R knee: mild-moderate joint effusion. Slightly warm to palpation. No signs of erythema or drainage. AROM 0-90 without grimacing. Distal NVI. Calf tender to palpation and with mild edema.      Pertinent Labs  Lab Results: personally reviewed.  Lab Results   Component Value Date    WBC 6.5 07/27/2018    HGB 9.8 (L) 07/27/2018    HCT 30.0 (L) 07/27/2018    MCV 95 07/27/2018     07/27/2018       Recent Labs  Lab 07/26/18  2210   INR 0.90       Pertinent Radiology  Radiology Results: images and radiology report reviewed  Recent Results  (from the past 24 hour(s))   XR Chest 2 Views    Narrative    CHEST 2 VIEWS  7/26/2018 11:27 PM     HISTORY: Fever.    COMPARISON: None.    FINDINGS: Curvilinear opacities in the medial aspects of bilateral  lung bases likely represent atelectasis or scarring. The lungs are  otherwise clear. Normal-sized cardiac silhouette.      Impression    IMPRESSION: No evidence of active cardiopulmonary disease.    JUANCARLOS VALDIVIA MD       Attestation:  I have reviewed today's vital signs, notes, medications, labs and imaging.  Amount of time performed on this consult: 30 minutes.     Mary Cannon

## 2018-07-27 NOTE — PROGRESS NOTES
"Pt is frustrated with equipment. Was monitoring SATs with cont pulse ox that was noted to drop down to 88-89% for a few seconds and then would rebound back into the mid to high 90s. This drop would cause the machine to alarm which would interrupt her sleep making patient irritable.   With initiation of tele, pt stated, \"Come on. I'm not here for my heart and don't even have a heart history. I'm here for my knee.\" Pt declined tele monitor.   MILLER Cabral updated with order to DC cont pulse ox and tele monitoring.   "

## 2018-07-27 NOTE — ED NOTES
Report called to ASHLEY Gaspar and all questions answered. She is to assume care of PT upon arrival to room 2211.

## 2018-07-27 NOTE — ED NOTES
PT is resting in position of comfort at this time and states pain is tolerable at this time. No further changes in PT condition from previous assessment. Awaiting PT to go to Radiology. I will continue to assess and monitor PT.

## 2018-07-27 NOTE — ED NOTES
No changes in PT condition from previous assessments. All needs are being met and all comfort measures are being addressed. Awaiting MD dispo at this time.

## 2018-07-27 NOTE — PLAN OF CARE
Problem: Patient Care Overview  Goal: Plan of Care/Patient Progress Review  Outcome: Improving  Patient pain controlled with PRN oxycodone.  Right knee remains moderately swollen.  Ambulated in ball with stand by assist and walker.  Afebrile this shift.

## 2018-07-27 NOTE — ED NOTES
"Patient has  [facility] to Observation  order. Patient has been given the Observation brochure -  What does Observation mean to me.\"  Patient has been given the opportunity to ask questions about observation status and their plan of care.      Ed Sheikh  "

## 2018-07-27 NOTE — ED NOTES
PT OOB to bathroom. Up on her feet and walking behind wheelchair with standby assist. Urine sample collected and sent to lab.

## 2018-07-27 NOTE — ED NOTES
PT is resting in position of comfort at this time. States she is pain free. No further changes in PT condition from previous assessments. All needs are being met and all comfort measures ar being addressed. Awaiting MD dispo at this time. I will continue to assess and monitor PT.

## 2018-07-27 NOTE — PHARMACY-VANCOMYCIN DOSING SERVICE
Pharmacy Vancomycin Initial Note  Date of Service 2018  Patient's  1955  62 year old, female    Indication: Surgical Prophylaxis    Current estimated CrCl = CrCl cannot be calculated (Unknown ideal weight.).    Creatinine for last 3 days  2018: 10:10 PM Creatinine 0.81 mg/dL    Recent Vancomycin Level(s) for last 3 days  No results found for requested labs within last 72 hours.      Vancomycin IV Administrations (past 72 hours)      No vancomycin orders with administrations in past 72 hours.                Nephrotoxins and other renal medications (Future)    Start     Dose/Rate Route Frequency Ordered Stop    18 0100  vancomycin (VANCOCIN) 1000 mg in dextrose 5% 200 mL PREMIX      1,000 mg  200 mL/hr over 1 Hours Intravenous EVERY 12 HOURS 18 0051            Contrast Orders - past 72 hours     None                Plan:  1.  Start vancomycin  1000 mg IV q12h.   2.  Goal Trough Level: 10-15 mg/L   3.  Pharmacy will check trough levels as appropriate in 1-3 Days.    4. Serum creatinine levels will be ordered daily for the first week of therapy and at least twice weekly for subsequent weeks.    5. [place] method utilized to dose vancomycin therapy: Method 2 (for orthopedic indication) and is also consistent with recommended surgical prophylaxis dosing.    Epifanio Garcia

## 2018-07-28 VITALS
OXYGEN SATURATION: 94 % | WEIGHT: 139.55 LBS | TEMPERATURE: 98.7 F | SYSTOLIC BLOOD PRESSURE: 109 MMHG | HEIGHT: 60 IN | DIASTOLIC BLOOD PRESSURE: 63 MMHG | BODY MASS INDEX: 27.4 KG/M2 | RESPIRATION RATE: 18 BRPM | HEART RATE: 93 BPM

## 2018-07-28 LAB
ANION GAP SERPL CALCULATED.3IONS-SCNC: 5 MMOL/L (ref 3–14)
BACTERIA SPEC CULT: NO GROWTH
BUN SERPL-MCNC: 14 MG/DL (ref 7–30)
CALCIUM SERPL-MCNC: 9 MG/DL (ref 8.5–10.1)
CHLORIDE SERPL-SCNC: 101 MMOL/L (ref 94–109)
CO2 SERPL-SCNC: 28 MMOL/L (ref 20–32)
CREAT SERPL-MCNC: 0.77 MG/DL (ref 0.52–1.04)
ERYTHROCYTE [DISTWIDTH] IN BLOOD BY AUTOMATED COUNT: 13.3 % (ref 10–15)
GFR SERPL CREATININE-BSD FRML MDRD: 76 ML/MIN/1.7M2
GLUCOSE SERPL-MCNC: 99 MG/DL (ref 70–99)
HCT VFR BLD AUTO: 30.8 % (ref 35–47)
HGB BLD-MCNC: 10 G/DL (ref 11.7–15.7)
Lab: NORMAL
MCH RBC QN AUTO: 30.2 PG (ref 26.5–33)
MCHC RBC AUTO-ENTMCNC: 32.5 G/DL (ref 31.5–36.5)
MCV RBC AUTO: 93 FL (ref 78–100)
PLATELET # BLD AUTO: 252 10E9/L (ref 150–450)
POTASSIUM SERPL-SCNC: 3.9 MMOL/L (ref 3.4–5.3)
RBC # BLD AUTO: 3.31 10E12/L (ref 3.8–5.2)
SODIUM SERPL-SCNC: 134 MMOL/L (ref 133–144)
SPECIMEN SOURCE: NORMAL
WBC # BLD AUTO: 7.1 10E9/L (ref 4–11)

## 2018-07-28 PROCEDURE — 25000128 H RX IP 250 OP 636: Performed by: PHYSICIAN ASSISTANT

## 2018-07-28 PROCEDURE — 25000132 ZZH RX MED GY IP 250 OP 250 PS 637: Performed by: STUDENT IN AN ORGANIZED HEALTH CARE EDUCATION/TRAINING PROGRAM

## 2018-07-28 PROCEDURE — 85027 COMPLETE CBC AUTOMATED: CPT | Performed by: PHYSICIAN ASSISTANT

## 2018-07-28 PROCEDURE — G0378 HOSPITAL OBSERVATION PER HR: HCPCS

## 2018-07-28 PROCEDURE — 96372 THER/PROPH/DIAG INJ SC/IM: CPT

## 2018-07-28 PROCEDURE — 36415 COLL VENOUS BLD VENIPUNCTURE: CPT | Performed by: PHYSICIAN ASSISTANT

## 2018-07-28 PROCEDURE — 80048 BASIC METABOLIC PNL TOTAL CA: CPT | Performed by: PHYSICIAN ASSISTANT

## 2018-07-28 PROCEDURE — 25000132 ZZH RX MED GY IP 250 OP 250 PS 637: Performed by: PHYSICIAN ASSISTANT

## 2018-07-28 PROCEDURE — 99217 ZZC OBSERVATION CARE DISCHARGE: CPT | Performed by: FAMILY MEDICINE

## 2018-07-28 RX ORDER — OXYCODONE HYDROCHLORIDE 5 MG/1
5-10 TABLET ORAL
Qty: 30 TABLET | Refills: 0 | Status: SHIPPED | OUTPATIENT
Start: 2018-07-28 | End: 2021-05-11

## 2018-07-28 RX ORDER — ACETAMINOPHEN 500 MG
1000 TABLET ORAL 3 TIMES DAILY
Qty: 100 TABLET | Refills: 0 | DISCHARGE
Start: 2018-07-28 | End: 2021-05-11

## 2018-07-28 RX ADMIN — OXYCODONE HYDROCHLORIDE 5 MG: 5 TABLET ORAL at 07:04

## 2018-07-28 RX ADMIN — OXYCODONE HYDROCHLORIDE 5 MG: 5 TABLET ORAL at 10:05

## 2018-07-28 RX ADMIN — ENOXAPARIN SODIUM 40 MG: 40 INJECTION SUBCUTANEOUS at 10:05

## 2018-07-28 RX ADMIN — OXYCODONE HYDROCHLORIDE 5 MG: 5 TABLET ORAL at 01:06

## 2018-07-28 RX ADMIN — CITALOPRAM HYDROBROMIDE 20 MG: 20 TABLET ORAL at 08:41

## 2018-07-28 RX ADMIN — OXYCODONE HYDROCHLORIDE 5 MG: 5 TABLET ORAL at 13:05

## 2018-07-28 RX ADMIN — SENNOSIDES AND DOCUSATE SODIUM 1 TABLET: 8.6; 5 TABLET ORAL at 08:41

## 2018-07-28 RX ADMIN — Medication 1 MG: at 01:38

## 2018-07-28 RX ADMIN — OXYCODONE HYDROCHLORIDE 5 MG: 5 TABLET ORAL at 04:01

## 2018-07-28 NOTE — PROGRESS NOTES
CARE TRANSITION SOCIAL WORK INITIAL ASSESSMENT:  Reason For Consult: discharge planning   Met with: Patient.    DATA  Active Problems:    Postoperative fever       Primary Care Clinic Name:  ([facility])     Contact information and PCP information verified: Yes      ASSESSMENT  Cognitive Status: awake, alert and oriented.       Resources List: Skilled Nursing Facility, Transitional Care     Lives With: spouse  Living Arrangements: house     Description of Support System: Supportive, Involved   Who is your support system?: , Children   Support Assessment: Adequate family and caregiver support, Adequate social supports   Insurance Concerns: No Insurance issues identified        This writer met with pt introduced self and role. Discussed discharge planning and medicare guidelines in regards to home care and SNF benefits. Pt is admitted here from [facility] TCU Phone: (Admissions: [phone] RN Report: [phone] Fax: [phone])  And the plan is for her to return there today. Pts dtr is transporting pt at 1400.       PLAN    TCU    Discharge Planner   Discharge Plans in progress: TCu  Barriers to discharge plan: none  Follow up plan: DC       Entered by: Nina Paige 07/28/2018 10:27 AM             Nina MCPHERSON, LICSW, [phone]

## 2018-07-28 NOTE — PLAN OF CARE
Problem: Infection, Risk/Actual (Adult)  Goal: Identify Related Risk Factors and Signs and Symptoms  Related risk factors and signs and symptoms are identified upon initiation of Human Response Clinical Practice Guideline (CPG).   Outcome: No Change  Right knee remains swollen and painful. Medicated with Oxycodone 5mg and Dilaudid 0.5mg PRN. States fair pain control after a few doses. Ambulated in ball and to bathroom with standby assist. Using ice to incision.

## 2018-07-28 NOTE — PLAN OF CARE
Problem: Patient Care Overview  Goal: Plan of Care/Patient Progress Review  [place] [facility] DISCHARGE NOTE    Patient discharged to transitional care unit at 1:45 PM via wheel chair. Accompanied by daughter and staff. Discharge instructions reviewed with patient, opportunity offered to ask questions. Prescriptions - None ordered for discharge. All belongings sent with patient.    Report called to Yarelis at [facility].    Dana Chino

## 2018-07-28 NOTE — PLAN OF CARE
Problem: Patient Care Overview  Goal: Plan of Care/Patient Progress Review  Outcome: Improving  Pain is controlled with 5 mg oxycodone PRN approximately every 3 hours.  Ice applied to knee.  Patient walked in ball with stand-by assistance and gets up to void. Tolerates well but says she is discouraged by what she sees as lack of progress due to continued need for pain control.

## 2018-07-28 NOTE — PROGRESS NOTES
"[facility] Progress Note      Post-operative Day:      * No surgery found *      Subjective:    Pain: minimal  Chest pain, SOB:  No  Knee feels \"swollen and warm\".    Objective:  Blood pressure 115/74, pulse 93, temperature 98.6  F (37  C), temperature source Oral, resp. rate 18, height 1.524 m (5'), weight 63.3 kg (139 lb 8.8 oz), SpO2 94 %.    Doppler US was negative for DVT. Chest XR did show likely atelectasis changes.    Patient Vitals for the past 24 hrs:   BP Temp Temp src Pulse Heart Rate Resp SpO2   07/28/18 0832 115/74 - - 93 - 18 94 %   07/28/18 0410 119/69 98.6  F (37  C) Oral 80 - 20 96 %   07/27/18 2316 129/68 99.2  F (37.3  C) Oral 81 - 18 95 %   07/27/18 1942 114/66 98.8  F (37.1  C) Oral 74 - 18 94 %   07/27/18 1551 112/68 99.3  F (37.4  C) Oral - 74 16 96 %   07/27/18 1156 105/59 98.5  F (36.9  C) Oral - 84 18 94 %       Wt Readings from Last 4 Encounters:   07/27/18 63.3 kg (139 lb 8.8 oz)     Patient sitting up comfortably eating breakfast this am.  She is able to easily straight leg raise and move the knee through motion without pain.  Knee without erythema. Moderate effusion typical of TKA one week postop.   Motor function, sensation, and circulation intact   Yes  Wound status: incisions are clean dry and intact. Yes  Calf tenderness: Bilateral  No    Pertinent Labs   Lab Results: personally reviewed.     Recent Labs   Lab Test  07/28/18   0528  07/27/18   0536  07/26/18   2210   INR   --    --   0.90   HGB  10.0*  9.8*  11.1*   HCT  30.8*  30.0*  34.3*   MCV  93  95  94   PLT  252  225  262   NA  134  141  134   CRP   --    --   150.0*       Plan: Anticoagulation protocol: Lovenox inpatient and then  mg daily at discharge  x 42  days            Pain medications:  oxycodone            Weight bearing status:  WBAT            Disposition:  Back to TCU today likely per hospital medicine            Continue cares and rehabilitation     Report completed by:  Ed Valdez " SHANE Bazan  Date: 7/28/2018  Time: 8:39 AM

## 2018-07-28 NOTE — PHARMACY - DISCHARGE MEDICATION RECONCILIATION
Discharge medication review for this patient is complete. Pharmacist assisted with medication reconciliation of discharge medications with prior to admission medications.     The following changes were made to the discharge medication list based on pharmacist review:  Added:  NA  Discontinued: NA  Changed:  Increase APAP to 1000 mg TID      Patient's Discharge Medication List  - medications as listed on After Visit Summary (AVS)     Review of your medicines      CONTINUE these medicines which may have CHANGED, or have new prescriptions. If we are uncertain of the size of tablets/capsules you have at home, strength may be listed as something that might have changed.       Dose / Directions    acetaminophen 500 MG tablet   Commonly known as:  TYLENOL   This may have changed:    - medication strength  - how much to take  - when to take this  - reasons to take this   Used for:  Postoperative pain of right knee        Dose:  1000 mg   Take 2 tablets (1,000 mg) by mouth 3 times daily   Quantity:  100 tablet   Refills:  0         CONTINUE these medicines which have NOT CHANGED       Dose / Directions    aspirin 325 MG tablet        Dose:  325 mg   Take 325 mg by mouth daily   Refills:  0       Biotin 1 MG Caps        Dose:  1 mg   Take 1 mg by mouth daily   Refills:  0       Calcium 250 MG Caps        Dose:  250 mg   Take 250 mg by mouth daily   Refills:  0       citalopram 20 MG tablet   Commonly known as:  celeXA        Dose:  20 mg   Take 20 mg by mouth daily   Refills:  0       OXYCODONE HCL PO        Dose:  5-10 mg   Take 5-10 mg by mouth every 3 hours as needed (pain)   Refills:  0       senna-docusate 8.6-50 MG per tablet   Commonly known as:  SENOKOT-S;PERICOLACE        Dose:  1 tablet   Take 1 tablet by mouth 2 times daily   Refills:  0       TURMERIC CURCUMIN PO        Dose:  1 capsule   Take 1 capsule by mouth daily   Refills:  0            Where to get your medicines      Some of these will need a paper  prescription and others can be bought over the counter. Ask your nurse if you have questions.     You don't need a prescription for these medications      acetaminophen 500 MG tablet

## 2018-07-29 NOTE — DISCHARGE SUMMARY
[place] Hospitalist Discharge Summary    Preeti Sherman MRN# [id number]   Age: 62 year old YOB: 1955     Date of Admission:  7/26/2018  Date of Discharge::  7/28/2018  1:55 PM  Admitting Physician:  Douglas Rodriguez MD  Discharge Physician:  Iván Winn MD  Primary Physician: [facility] clinic: [facility]                Discharge Diagnosis:   Principle diagnosis: Post op fever without evidence of infection.      Secondary diagnoses:  S/P right TKA.   Hx Hep B  Post op anemia      Discharge Instructions:   Tylenol and oxycodone for pain.   No further antibiotics, since no evidence of infection found  Would recheck if any renewed fever.      Follow up with primary care provider in 7 days        Procedures:       Results for orders placed or performed during the hospital encounter of 07/26/18   XR Chest 2 Views    Narrative    CHEST 2 VIEWS  7/26/2018 11:27 PM     HISTORY: Fever.    COMPARISON: None.    FINDINGS: Curvilinear opacities in the medial aspects of bilateral  lung bases likely represent atelectasis or scarring. The lungs are  otherwise clear. Normal-sized cardiac silhouette.      Impression    IMPRESSION: No evidence of active cardiopulmonary disease.    JUANCARLOS VALDIVIA MD   US Lower Extremity Venous Duplex Right    Narrative    US LOWER EXTREMITY VENOUS DUPLEX RIGHT 7/27/2018 9:54 AM    HISTORY: Right lower extremity edema with calf tenderness. Swelling.    TECHNIQUE: Color flow and doppler spectral waveform analysis of deep  venous structures is performed.  Imaged deep venous structures of the  right lower extremity include the right common femoral vein, femoral  vein, popliteal vein, and visualized posterior deep calf veins.    COMPARISON: None.    FINDINGS: No DVT is demonstrated. There is a complex Baker's cyst in  the popliteal fossa that measures 4.0 x 2.8 x 1.4 cm.      Impression    IMPRESSION:  1. Negative right lower extremity venous Doppler for DVT.  2.  Perez's cyst.    VARUN MADDOX MD                    Allergies:      Allergies   Allergen Reactions     Penicillins Hives     Sulfa Drugs Hives                  Discharge Medications:     Discharge Medication List as of 7/28/2018  1:26 PM      CONTINUE these medications which have CHANGED    Details   acetaminophen (TYLENOL) 500 MG tablet Take 2 tablets (1,000 mg) by mouth 3 times daily, Disp-100 tablet, R-0, Transitional         CONTINUE these medications which have NOT CHANGED    Details   aspirin 325 MG tablet Take 325 mg by mouth daily, Historical      Biotin 1 MG CAPS Take 1 mg by mouth daily, Historical      Calcium 250 MG CAPS Take 250 mg by mouth daily, Historical      citalopram (CELEXA) 20 MG tablet Take 20 mg by mouth daily, Historical      senna-docusate (SENOKOT-S;PERICOLACE) 8.6-50 MG per tablet Take 1 tablet by mouth 2 times daily, Historical      TURMERIC CURCUMIN PO Take 1 capsule by mouth daily, Historical      OXYCODONE HCL PO Take 5-10 mg by mouth every 3 hours as needed (pain), Historical                   Consultations:   Ortho           Brief History of Presenting Illness:   Discharged yesterday from hospital after right total knee replacement. Was feeling well when she left the hospital. Still having right knee pain as well as right sided calf pain, which started initially after surgery. Described as achy pain. Walking and pushing on it makes it worse. Pain medications have made it better. Currently pain in calf and knee is about the same as when she left. Feels like her knee though is somewhat more swollen than when she left.     States she felt chilled when she was at the TCU, but did not feel feverish. No cough, shortness of breath, sore throat or urinary symptoms. No bowel movement since surgery, is passing gas.      Patient denies lightheadedness, dizziness, cough, congestion, rhinorrhea, sore throat, shortness of breath, palpitations, chest pain, abdominal pain, nausea, vomiting,  diarrhea, changes in urination (dysuria, changes in frequency/urgency), rash or wounds.             Hospital Course:   POD 4 Right Total Knee Arthroplasty  Patient discharged yesterday after right total knee arthroplasty. Reportedly febrile up to 102, presented with fever of 101.1. Patient with chills otherwise no localizing symptoms. Increased knee swelling. Stable calf and knee pain. WBC normal. ESR and CRP elevated, though difficult to assess significance given recent surgery. , ESR 37. UA not consistent with infection. No infiltrate on CXR. ED spoke with ortho in ED who recommended admission and initiation of IV vancomycin. Ortho evaluated the patient this morning, low suspicion for post-operative infection. Agree as no increase in pain at site, WBC normal, no erythema noted. Though she does have increase swelling and subtle warmth. DVT possible given calf pain with fever, US ordered and negative. Low suspicion for PE without tachycardia, pain or shortness of breath, though at risk with recent surgery and did have very brief episodes of hypoxemia overnight. Hemarthrosis also possible given increase in swelling. Atelectasis also possible. No current localizing infectious symptoms on review of symptoms or signs exam.  - ortho consult, did not feel the knee looked infected.    - pain management: acetaminophen and oxycodone  - continue enoxaparin, per orthopedic service  - discontinue IV vancomycin, low suspicion   - follow pending blood cultures  - add on procalcitonin was low.    - no localizing findings seen      Post-operative anemia  Hgb 9.8. Last check, 9.6 prior to discharge. Though she was 11.1 on presentation and repeat is down just over 1 point. She did receive 1.5 L of fluids, so possibly associated with dilution. Small amount of blood on sheet near knee, no acute bleeding from incision. Hemarthrosis, knee with increased swelling, also noted on orthopedic note. Overall hemoglobin is stable from  discharge.  - AM CBC  - monitor for signs of bleeding     Major depression in partial remission   Stable.   - continue home Celexa      Hyperlipidemia LDL goal <130  Per chart review. Not currently on any medications for this.  - Continue outpatient management      History of hepatitis B  Per chart review. Follows at [facility], records not available.               Discharge Exam:   OBJECTIVE:   /63 (BP Location: Right arm)  Pulse 93  Temp 98.7  F (37.1  C) (Oral)  Resp 18  Ht 1.524 m (5')  Wt 63.3 kg (139 lb 8.8 oz)  SpO2 94%  BMI 27.25 kg/m2    GENERAL APPEARANCE:  Alert, NAD, Ox3     RESP:clear      CV: regular rates and rhythm,no murmur, no click or rub - no edema     Abdomen: soft, nontender, no liver or spleen enlargement, no masses, BSs normal   Skin: no cyanosis, pallor, or jaundice   Right knee without erythema, only minimal tenderness as expected.             Pending Tests at Discharge:     Unresulted Labs Ordered in the Past 30 Days of this Admission     Date and Time Order Name Status Description    7/26/2018 2212 Blood culture Preliminary     7/26/2018 2212 Blood culture Preliminary                    Discharge Disposition:   Discharged to rehabilitation facility      Attestation:  Amount of time performed on this discharge : 30 minutes.    Iván Winn MD

## 2018-07-30 ENCOUNTER — OFFICE VISIT - HEALTHEAST (OUTPATIENT)
Dept: GERIATRICS | Facility: CLINIC | Age: 63
End: 2018-07-30

## 2018-07-30 DIAGNOSIS — Z96.659 FAILED TOTAL KNEE ARTHROPLASTY (H): ICD-10-CM

## 2018-07-30 DIAGNOSIS — D64.9 POSTOPERATIVE ANEMIA: ICD-10-CM

## 2018-07-30 DIAGNOSIS — R52 PAIN MANAGEMENT: ICD-10-CM

## 2018-07-30 DIAGNOSIS — T84.018A FAILED TOTAL KNEE ARTHROPLASTY (H): ICD-10-CM

## 2018-08-02 ENCOUNTER — OFFICE VISIT - HEALTHEAST (OUTPATIENT)
Dept: GERIATRICS | Facility: CLINIC | Age: 63
End: 2018-08-02

## 2018-08-02 DIAGNOSIS — R52 PAIN MANAGEMENT: ICD-10-CM

## 2018-08-02 DIAGNOSIS — Z96.652 S/P TOTAL KNEE ARTHROPLASTY, LEFT: ICD-10-CM

## 2018-08-02 DIAGNOSIS — M17.9 OA (OSTEOARTHRITIS) OF KNEE: ICD-10-CM

## 2018-08-02 DIAGNOSIS — F32.A DEPRESSION: ICD-10-CM

## 2018-08-02 LAB
BACTERIA SPEC CULT: NO GROWTH
BACTERIA SPEC CULT: NO GROWTH
Lab: NORMAL
Lab: NORMAL
SPECIMEN SOURCE: NORMAL
SPECIMEN SOURCE: NORMAL

## 2018-08-06 ENCOUNTER — AMBULATORY - HEALTHEAST (OUTPATIENT)
Dept: GERIATRICS | Facility: CLINIC | Age: 63
End: 2018-08-06

## 2018-08-15 ENCOUNTER — THERAPY VISIT (OUTPATIENT)
Dept: PHYSICAL THERAPY | Facility: CLINIC | Age: 63
End: 2018-08-15
Attending: FAMILY MEDICINE
Payer: COMMERCIAL

## 2018-08-15 DIAGNOSIS — M25.561 KNEE PAIN, RIGHT: Primary | ICD-10-CM

## 2018-08-15 PROCEDURE — 97110 THERAPEUTIC EXERCISES: CPT | Mod: GP | Performed by: PHYSICAL THERAPIST

## 2018-08-15 PROCEDURE — 97161 PT EVAL LOW COMPLEX 20 MIN: CPT | Mod: GP | Performed by: PHYSICAL THERAPIST

## 2018-08-15 ASSESSMENT — ACTIVITIES OF DAILY LIVING (ADL)
HOW_WOULD_YOU_RATE_THE_OVERALL_FUNCTION_OF_YOUR_KNEE_DURING_YOUR_USUAL_DAILY_ACTIVITIES?: ABNORMAL
SIT WITH YOUR KNEE BENT: ACTIVITY IS SOMEWHAT DIFFICULT
SWELLING: THE SYMPTOM AFFECTS MY ACTIVITY SEVERELY
HOW_WOULD_YOU_RATE_THE_CURRENT_FUNCTION_OF_YOUR_KNEE_DURING_YOUR_USUAL_DAILY_ACTIVITIES_ON_A_SCALE_FROM_0_TO_100_WITH_100_BEING_YOUR_LEVEL_OF_KNEE_FUNCTION_PRIOR_TO_YOUR_INJURY_AND_0_BEING_THE_INABILITY_TO_PERFORM_ANY_OF_YOUR_USUAL_DAILY_ACTIVITIES?: 70
KNEEL ON THE FRONT OF YOUR KNEE: I AM UNABLE TO DO THE ACTIVITY
AS_A_RESULT_OF_YOUR_KNEE_INJURY,_HOW_WOULD_YOU_RATE_YOUR_CURRENT_LEVEL_OF_DAILY_ACTIVITY?: ABNORMAL
GO UP STAIRS: ACTIVITY IS SOMEWHAT DIFFICULT
KNEE_ACTIVITY_OF_DAILY_LIVING_SUM: 29
GIVING WAY, BUCKLING OR SHIFTING OF KNEE: THE SYMPTOM AFFECTS MY ACTIVITY SEVERELY
GO DOWN STAIRS: ACTIVITY IS SOMEWHAT DIFFICULT
RAW_SCORE: 29
LIMPING: THE SYMPTOM AFFECTS MY ACTIVITY SEVERELY
RISE FROM A CHAIR: ACTIVITY IS SOMEWHAT DIFFICULT
STIFFNESS: THE SYMPTOM AFFECTS MY ACTIVITY SEVERELY
KNEE_ACTIVITY_OF_DAILY_LIVING_SCORE: 41.43
STAND: ACTIVITY IS NOT DIFFICULT
WEAKNESS: THE SYMPTOM AFFECTS MY ACTIVITY SEVERELY
WALK: ACTIVITY IS SOMEWHAT DIFFICULT
PAIN: THE SYMPTOM AFFECTS MY ACTIVITY SEVERELY
SQUAT: ACTIVITY IS SOMEWHAT DIFFICULT

## 2018-08-15 NOTE — MR AVS SNAPSHOT
After Visit Summary   8/15/2018    Preeti Sherman    MRN: [id number]           Patient Information     Date Of Birth          1955        Visit Information        Provider Department      8/15/2018 4:25 PM Elmer Yepez, PT [facility]        Today's Diagnoses     Knee pain, right    -  1       Follow-ups after your visit        Your next 10 appointments already scheduled     Aug 22, 2018  1:45 PM CDT   ALETHEA Extremity with Elmer Yepez [facility]    [address] [facility] [id number] [phone]            Aug 29, 2018 10:10 AM CDT   ALETHEA Extremity with [facility] ([facility])    [address] [facility] [id number] [phone]              Who to contact     If you have questions or need follow up information about today's clinic visit or your schedule please contact [place] [facility] directly at [phone].  Normal or non-critical lab and imaging results will be communicated to you by [email or web address], letter or phone within 4 business days after the clinic has received the results. If you do not hear from us within 7 days, please contact the clinic through [employer] or phone. If you have a critical or abnormal lab result, we will notify you by phone as soon as possible.  Submit refill requests through [employer] or call your pharmacy and they will forward the refill request to us. Please allow 3 business days for your refill to be completed.          Additional Information About Your Visit        [email or web address] Information     [employer] gives you secure access to your electronic health record. If you see a primary care provider, you can also send messages to your care team and make appointments. If you have questions, please call your primary care clinic.  If you do not have a primary care provider, please call [phone] and they will assist you.        Care EveryWhere ID      This is your Care EveryWhere ID. This could be used by other organizations to access your [facility] medical records  [id number]         Blood Pressure from Last 3 Encounters:   07/28/18 109/63   07/26/18 132/67   07/20/18 132/82    Weight from Last 3 Encounters:   07/27/18 63.3 kg (139 lb 8.8 oz)   07/23/18 59.9 kg (132 lb)   07/20/18 59.9 kg (132 lb)              We Performed the Following     HC PT EVAL, LOW COMPLEXITY     ALETHEA INITIAL EVAL REPORT     THERAPEUTIC EXERCISES          Today's Medication Changes          These changes are accurate as of 8/15/18 11:59 PM.  If you have any questions, ask your nurse or doctor.               These medicines have changed or have updated prescriptions.        Dose/Directions    * citalopram 20 MG tablet   Commonly known as:  celeXA   This may have changed:  Another medication with the same name was changed. Make sure you understand how and when to take each.        Dose:  20 mg   Take 20 mg by mouth daily   Refills:  0       * citalopram 20 MG tablet   Commonly known as:  celeXA   This may have changed:    - how much to take  - how to take this  - when to take this  - additional instructions   Used for:  Major depressive disorder, recurrent episode, in partial remission (H)        Take 1/2 tablet (10 mg) for 1-2 weeks, then increase to 1 tablet orally daily   Quantity:  30 tablet   Refills:  1       * Notice:  This list has 2 medication(s) that are the same as other medications prescribed for you. Read the directions carefully, and ask your doctor or other care provider to review them with you.             Primary Care Provider Office Phone # Fax #    [facility] [phone] [address] [facility] [address]        Equal Access to Services     CHRISTIAN CAGE : Tayla Carlson, ofe nye, noel fu. So [facility] [phone].    ATENCIÓN: Si charlee ventura wan  disposición servicios gratuitos de asistencia lingüística. Rashid jimenez [phone].    We comply with applicable federal civil rights laws and [place] laws. We do not discriminate on the basis of race, color, national origin, age, disability, sex, sexual orientation, or gender identity.            Thank you!     Thank you for choosing [facility]  for your care. Our goal is always to provide you with excellent care. Hearing back from our patients is one way we can continue to improve our services. Please take a few minutes to complete the written survey that you may receive in the mail after your visit with us. Thank you!             Your Updated Medication List - Protect others around you: Learn how to safely use, store and throw away your medicines at www.disposemymeds.org.          This list is accurate as of 8/15/18 11:59 PM.  Always use your most recent med list.                   Brand Name Dispense Instructions for use Diagnosis    * acetaminophen 325 MG tablet    TYLENOL    100 tablet    Take 2 tablets (650 mg) by mouth every 4 hours as needed for mild pain    S/P total knee arthroplasty, right       * acetaminophen 500 MG tablet    TYLENOL    100 tablet    Take 2 tablets (1,000 mg) by mouth 3 times daily    Postoperative pain of right knee       * aspirin 325 MG tablet      Take 325 mg by mouth daily        * aspirin 325 MG EC tablet     42 tablet    Take 1 tablet (325 mg) by mouth daily    S/P total knee arthroplasty, right       * Biotin 1 MG Caps      Take 1 capsule by mouth daily        * Biotin 1 MG Caps      Take 1 mg by mouth daily        * Calcium 250 MG Caps      Take 1 capsule by mouth daily        * Calcium 250 MG Caps      Take 250 mg by mouth daily        * citalopram 20 MG tablet    celeXA     Take 20 mg by mouth daily        * citalopram 20 MG tablet    celeXA    30 tablet    Take 1/2 tablet (10 mg) for 1-2 weeks, then increase to 1 tablet orally daily    Major depressive  disorder, recurrent episode, in partial remission (H)       * oxyCODONE IR 5 MG tablet    ROXICODONE    60 tablet    Take 1-2 tablets (5-10 mg) by mouth every 3 hours as needed for other (pain control or improvement in physical function. Hold dose for analgesic side effects.)    S/P total knee arthroplasty, right       * oxyCODONE IR 5 MG tablet    ROXICODONE    30 tablet    Take 1-2 tablets (5-10 mg) by mouth every 3 hours as needed (pain)    Postoperative pain of right knee       * senna-docusate 8.6-50 MG per tablet    SENOKOT-S;PERICOLACE     Take 1 tablet by mouth 2 times daily        * senna-docusate 8.6-50 MG per tablet    SENOKOT-S;PERICOLACE    100 tablet    Take 1 tablet by mouth 2 times daily    S/P total knee arthroplasty, right       * TURMERIC CURCUMIN PO      Take 1 capsule by mouth daily        * TURMERIC CURCUMIN PO      Take 1 capsule by mouth daily        * Notice:  This list has 16 medication(s) that are the same as other medications prescribed for you. Read the directions carefully, and ask your doctor or other care provider to review them with you.

## 2018-08-15 NOTE — PROGRESS NOTES
[facility] Initial Evaluation  Subjective:  Patient is a 62 year old female presenting with rehab right knee hpi.   Preeti Sherman is a 62 year old female with a right knee condition.  Condition occurred with:  Degenerative joint disease.  Condition occurred: at home.  This is a new condition  R TKA 7/30/18. Patient wants to drive home to [place] as soon as MD clears her. Not using SEC for any ambulation..    Patient reports pain:  Anterior and in the joint.  Radiates to: none.  Pain is described as sharp and is intermittent and reported as 7/10.  Associated symptoms:  Loss of motion/stiffness, loss of strength and edema. Pain is worse during the day.  Symptoms are exacerbated by activity, ascending stairs, bending/squatting, descending stairs, kneeling, weight bearing, walking, standing and sitting and relieved by rest and activity/movement.  Since onset symptoms are gradually improving.  Special tests:  X-ray.  Previous treatment includes surgery.  There was moderate improvement following previous treatment.  General health as reported by patient is good.  Past medical history: hepatitis, significant weakness.  Medical allergies: no.  Other surgeries include:  None reported.  Current medications:  Anti-depressants, anti-inflammatory and pain medication.  Current occupation is .  Patient is currently not working due to present treatment problem.  Primary job tasks include:  Driving, lifting and prolonged standing.    Barriers include:  None as reported by the patient.    Red flags:  None as reported by the patient.                        Objective:    Gait:  Ambulates without SEC but moderate limp favoring R leg                                                          Knee Evaluation:  ROM:      PROM      Extension: Left:   Right:  6  Flexion: Left:   Right:  125      Strength:         Quad Set Left: Fair    Pain:   Quad Set Right: Poor    Pain:                  General      ROS    Assessment/Plan:    Patient is a 62 year old female with right side knee complaints.    Patient has the following significant findings with corresponding treatment plan.                Diagnosis 1:  S/p R TKA  Pain -  manual therapy, splint/taping/bracing/orthotics, self management, education and home program  Decreased ROM/flexibility - manual therapy and therapeutic exercise  Decreased joint mobility - manual therapy and therapeutic exercise  Decreased strength - therapeutic exercise and therapeutic activities  Impaired balance - neuro re-education and therapeutic activities  Edema - electric stimulation, cold therapy and self management/home program  Impaired gait - gait training, assistive devices and home program    Therapy Evaluation Codes:   1) History comprised of:   Personal factors that impact the plan of care:      Overall behavior pattern, Past/current experiences and Time since onset of symptoms.    Comorbidity factors that impact the plan of care are:      None.     Medications impacting care: Anti-depressant, Anti-inflammatory and Pain.  2) Examination of Body Systems comprised of:   Body structures and functions that impact the plan of care:      Knee.   Activity limitations that impact the plan of care are:      Bathing, Bending, Driving, Dressing, Lifting, Sitting, Squatting/kneeling, Stairs, Standing, Walking, Working, Sleeping and Laying down.  3) Clinical presentation characteristics are:   Stable/Uncomplicated.  4) Decision-Making    Low complexity using standardized patient assessment instrument and/or measureable assessment of functional outcome.  Cumulative Therapy Evaluation is: Low complexity.    Previous and current functional limitations:  (See Goal Flow Sheet for this information)    Short term and Long term goals: (See Goal Flow Sheet for this information)     Communication ability:  Patient appears to be able to clearly communicate and understand verbal and written  communication and follow directions correctly.  Treatment Explanation - The following has been discussed with the patient:   RX ordered/plan of care  Anticipated outcomes  Possible risks and side effects  This patient would benefit from PT intervention to resume normal activities.   Rehab potential is good.    Frequency:  1 X week, once daily  Duration:  for 8 weeks  Discharge Plan:  Achieve all LTG.  Independent in home treatment program.  Reach maximal therapeutic benefit.    Please refer to the daily flowsheet for treatment today, total treatment time and time spent performing 1:1 timed codes.

## 2018-08-16 PROBLEM — M25.561 KNEE PAIN, RIGHT: Status: ACTIVE | Noted: 2018-08-16

## 2019-03-01 ENCOUNTER — TRANSFERRED RECORDS (OUTPATIENT)
Dept: HEALTH INFORMATION MANAGEMENT | Facility: CLINIC | Age: 64
End: 2019-03-01

## 2019-03-02 ENCOUNTER — TRANSFERRED RECORDS (OUTPATIENT)
Dept: HEALTH INFORMATION MANAGEMENT | Facility: CLINIC | Age: 64
End: 2019-03-02

## 2019-03-03 ENCOUNTER — TRANSFERRED RECORDS (OUTPATIENT)
Dept: HEALTH INFORMATION MANAGEMENT | Facility: CLINIC | Age: 64
End: 2019-03-03

## 2019-03-04 LAB
ALT SERPL-CCNC: 16 IU/L (ref 10–33)
AST SERPL-CCNC: 23 IU/L (ref 10–32)
CREAT SERPL-MCNC: 1.6 MG/DL (ref 0.5–0.9)
GFR SERPL CREATININE-BSD FRML MDRD: 36 ML/MIN/{1.73_M2}
GLUCOSE SERPL-MCNC: 93 MG/DL (ref 74–106)
POTASSIUM SERPL-SCNC: 4 MMOL/L (ref 3.5–5.1)

## 2019-04-30 ENCOUNTER — OFFICE VISIT (OUTPATIENT)
Dept: FAMILY MEDICINE | Facility: CLINIC | Age: 64
End: 2019-04-30
Payer: COMMERCIAL

## 2019-04-30 VITALS
RESPIRATION RATE: 14 BRPM | HEART RATE: 72 BPM | TEMPERATURE: 97.4 F | BODY MASS INDEX: 24.94 KG/M2 | WEIGHT: 127 LBS | HEIGHT: 60 IN | SYSTOLIC BLOOD PRESSURE: 120 MMHG | DIASTOLIC BLOOD PRESSURE: 68 MMHG

## 2019-04-30 DIAGNOSIS — N30.00 ACUTE CYSTITIS WITHOUT HEMATURIA: Primary | ICD-10-CM

## 2019-04-30 DIAGNOSIS — Z87.828 H/O KIDNEY INJURY: ICD-10-CM

## 2019-04-30 DIAGNOSIS — R82.90 NONSPECIFIC FINDING ON EXAMINATION OF URINE: ICD-10-CM

## 2019-04-30 DIAGNOSIS — R35.0 URINE FREQUENCY: ICD-10-CM

## 2019-04-30 LAB
ALBUMIN SERPL-MCNC: 3.8 G/DL (ref 3.4–5)
ALBUMIN UR-MCNC: NEGATIVE MG/DL
ALP SERPL-CCNC: 90 U/L (ref 40–150)
ALT SERPL W P-5'-P-CCNC: 47 U/L (ref 0–50)
ANION GAP SERPL CALCULATED.3IONS-SCNC: 4 MMOL/L (ref 3–14)
APPEARANCE UR: CLEAR
AST SERPL W P-5'-P-CCNC: 35 U/L (ref 0–45)
BACTERIA #/AREA URNS HPF: ABNORMAL /HPF
BASOPHILS # BLD AUTO: 0 10E9/L (ref 0–0.2)
BASOPHILS NFR BLD AUTO: 0.2 %
BILIRUB SERPL-MCNC: 0.5 MG/DL (ref 0.2–1.3)
BILIRUB UR QL STRIP: NEGATIVE
BUN SERPL-MCNC: 14 MG/DL (ref 7–30)
CALCIUM SERPL-MCNC: 9.3 MG/DL (ref 8.5–10.1)
CHLORIDE SERPL-SCNC: 105 MMOL/L (ref 94–109)
CO2 SERPL-SCNC: 28 MMOL/L (ref 20–32)
COLOR UR AUTO: YELLOW
CREAT SERPL-MCNC: 1.09 MG/DL (ref 0.52–1.04)
CRP SERPL-MCNC: 4.4 MG/L (ref 0–8)
DIFFERENTIAL METHOD BLD: NORMAL
EOSINOPHIL # BLD AUTO: 0.1 10E9/L (ref 0–0.7)
EOSINOPHIL NFR BLD AUTO: 2.8 %
ERYTHROCYTE [DISTWIDTH] IN BLOOD BY AUTOMATED COUNT: 13.9 % (ref 10–15)
GFR SERPL CREATININE-BSD FRML MDRD: 54 ML/MIN/{1.73_M2}
GLUCOSE SERPL-MCNC: 82 MG/DL (ref 70–99)
GLUCOSE UR STRIP-MCNC: NEGATIVE MG/DL
HCT VFR BLD AUTO: 39.8 % (ref 35–47)
HGB BLD-MCNC: 13 G/DL (ref 11.7–15.7)
HGB UR QL STRIP: NEGATIVE
KETONES UR STRIP-MCNC: NEGATIVE MG/DL
LEUKOCYTE ESTERASE UR QL STRIP: ABNORMAL
LYMPHOCYTES # BLD AUTO: 1.3 10E9/L (ref 0.8–5.3)
LYMPHOCYTES NFR BLD AUTO: 25.3 %
MCH RBC QN AUTO: 29.8 PG (ref 26.5–33)
MCHC RBC AUTO-ENTMCNC: 32.7 G/DL (ref 31.5–36.5)
MCV RBC AUTO: 91 FL (ref 78–100)
MONOCYTES # BLD AUTO: 0.5 10E9/L (ref 0–1.3)
MONOCYTES NFR BLD AUTO: 9.9 %
NEUTROPHILS # BLD AUTO: 3.1 10E9/L (ref 1.6–8.3)
NEUTROPHILS NFR BLD AUTO: 61.8 %
NITRATE UR QL: POSITIVE
PH UR STRIP: 6 PH (ref 5–7)
PLATELET # BLD AUTO: 310 10E9/L (ref 150–450)
POTASSIUM SERPL-SCNC: 4.2 MMOL/L (ref 3.4–5.3)
PROT SERPL-MCNC: 7.4 G/DL (ref 6.8–8.8)
RBC # BLD AUTO: 4.36 10E12/L (ref 3.8–5.2)
RBC #/AREA URNS AUTO: ABNORMAL /HPF
SODIUM SERPL-SCNC: 137 MMOL/L (ref 133–144)
SOURCE: ABNORMAL
SP GR UR STRIP: 1.02 (ref 1–1.03)
UROBILINOGEN UR STRIP-ACNC: 0.2 EU/DL (ref 0.2–1)
WBC # BLD AUTO: 5.1 10E9/L (ref 4–11)
WBC #/AREA URNS AUTO: ABNORMAL /HPF
WBC CLUMPS #/AREA URNS HPF: PRESENT /HPF

## 2019-04-30 PROCEDURE — 81001 URINALYSIS AUTO W/SCOPE: CPT | Performed by: PHYSICIAN ASSISTANT

## 2019-04-30 PROCEDURE — 87088 URINE BACTERIA CULTURE: CPT | Performed by: PHYSICIAN ASSISTANT

## 2019-04-30 PROCEDURE — 80053 COMPREHEN METABOLIC PANEL: CPT | Performed by: PHYSICIAN ASSISTANT

## 2019-04-30 PROCEDURE — 87186 SC STD MICRODIL/AGAR DIL: CPT | Performed by: PHYSICIAN ASSISTANT

## 2019-04-30 PROCEDURE — 36415 COLL VENOUS BLD VENIPUNCTURE: CPT | Performed by: PHYSICIAN ASSISTANT

## 2019-04-30 PROCEDURE — 86140 C-REACTIVE PROTEIN: CPT | Performed by: PHYSICIAN ASSISTANT

## 2019-04-30 PROCEDURE — 99214 OFFICE O/P EST MOD 30 MIN: CPT | Performed by: PHYSICIAN ASSISTANT

## 2019-04-30 PROCEDURE — 87086 URINE CULTURE/COLONY COUNT: CPT | Performed by: PHYSICIAN ASSISTANT

## 2019-04-30 PROCEDURE — 85025 COMPLETE CBC W/AUTO DIFF WBC: CPT | Performed by: PHYSICIAN ASSISTANT

## 2019-04-30 RX ORDER — CIPROFLOXACIN 500 MG/1
500 TABLET, FILM COATED ORAL 2 TIMES DAILY
Qty: 10 TABLET | Refills: 0 | Status: SHIPPED | OUTPATIENT
Start: 2019-04-30 | End: 2019-05-05

## 2019-04-30 ASSESSMENT — PAIN SCALES - GENERAL: PAINLEVEL: MILD PAIN (3)

## 2019-04-30 ASSESSMENT — MIFFLIN-ST. JEOR: SCORE: 1057.57

## 2019-04-30 NOTE — PATIENT INSTRUCTIONS
Start the antibiotic    Labs today - I should have those results in the next 1-2 days    We will try to collect records from the hospitals in [place] so I can try and figure out what was done (specifically what scans may have been done)

## 2019-04-30 NOTE — PROGRESS NOTES
"  SUBJECTIVE:   Preeti Sherman is a 63 year old female who presents to clinic today for the following   health issues:      URINARY TRACT SYMPTOMS  Onset: 2-3 days     Description:   Painful urination (Dysuria): no   Blood in urine (Hematuria): no   Delay in urine (Hesitency): no     Intensity: moderate    Progression of Symptoms:  worsening    Accompanying Signs & Symptoms:  Fever/chills: no   Flank pain YES- mild   Nausea and vomiting: no   Any vaginal symptoms: none  Abdominal/Pelvic Pain: no     History:   History of frequent UTI's: YES  History of kidney stones: YES- she did have one a month and a half ago   Sexually Active: no   Possibility of pregnancy: No    Precipitating factors:   None    Therapies Tried and outcome: Increase fluid intake        Additional history:     Patient drove herself up from [place] arriving yesterday  Has a home in [place] as well as here  Plans to go back to [place] in a week but fed up with the health care in [place] and needs to figure out \"what's going on\"     Says that in the end of Feb she became very ill with a fever  Said she did not have any urinary symptoms to suggest a UTI (and notes she is familiar with UTI's so knows what they feel like)  When she wasn't getting better her daughter called one of her friends to go check in on her and he suggested bringing her to the doctor  She did not want to go but eventually agreed to the ED    Was taken to the [facility] ED  Not sure exactly what they did but thinks they did an US and labs and then found that her kidneys were \"failing\" so had her transferred to the bigger hospital in the [facility]  Again, not sure what all they did but says they could not figure out why her kidneys failed to begin with. PUt her on medications for presumed UTI and had her f/u with nephrology on discharge    Nephrologist rechecked the kidney function per her report and told her she had regained 70% of the function (from a low of 10%)  She asked to see a urologist " "given her history   Saw him before she drove back here - he didn't feel labs were needed. She did leave a urine sample but says she still has not heard anything about the results despite trying to call them hence her frustration with things    Tells me today she feels like something is being missed  No one could explain why her kidneys failed which bothers her  Still having LLQ pain that she can reproduce with palpation  No fevers  Bowels are normal for her  Appetite okay    Urinary issues not new for her  States she had infections constantly when she was younger  Dx with reflux and had her ureter \"moved/reattached\"           Reviewed  and updated as needed this visit by clinical staff         Reviewed and updated as needed this visit by Provider         Current Outpatient Medications   Medication Sig Dispense Refill     acetaminophen (TYLENOL) 325 MG tablet Take 2 tablets (650 mg) by mouth every 4 hours as needed for mild pain 100 tablet 0     acetaminophen (TYLENOL) 500 MG tablet Take 2 tablets (1,000 mg) by mouth 3 times daily 100 tablet 0     aspirin 325 MG tablet Take 325 mg by mouth daily       Biotin 1 MG CAPS Take 1 mg by mouth daily       Biotin 1 MG CAPS Take 1 capsule by mouth daily        Calcium 250 MG CAPS Take 250 mg by mouth daily       Calcium 250 MG CAPS Take 1 capsule by mouth daily        citalopram (CELEXA) 20 MG tablet Take 20 mg by mouth daily       citalopram (CELEXA) 20 MG tablet Take 1/2 tablet (10 mg) for 1-2 weeks, then increase to 1 tablet orally daily (Patient taking differently: Take 20 mg by mouth every morning Take 1/2 tablet (10 mg) for 1-2 weeks, then increase to 1 tablet orally daily) 30 tablet 1     oxyCODONE IR (ROXICODONE) 5 MG tablet Take 1-2 tablets (5-10 mg) by mouth every 3 hours as needed (pain) 30 tablet 0     oxyCODONE IR (ROXICODONE) 5 MG tablet Take 1-2 tablets (5-10 mg) by mouth every 3 hours as needed for other (pain control or improvement in physical function. Hold " dose for analgesic side effects.) 60 tablet 0     senna-docusate (SENOKOT-S;PERICOLACE) 8.6-50 MG per tablet Take 1 tablet by mouth 2 times daily 100 tablet 0     senna-docusate (SENOKOT-S;PERICOLACE) 8.6-50 MG per tablet Take 1 tablet by mouth 2 times daily       TURMERIC CURCUMIN PO Take 1 capsule by mouth daily       TURMERIC CURCUMIN PO Take 1 capsule by mouth daily       Allergies   Allergen Reactions     Penicillins Itching     Patient notes she can take keflex  Astridsteve Marin PA-C      Penicillins Hives     Sulfa Drugs Itching     Sulfa Drugs Hives     BP Readings from Last 3 Encounters:   04/30/19 120/68   07/28/18 109/63   07/26/18 132/67    Wt Readings from Last 3 Encounters:   04/30/19 57.6 kg (127 lb)   07/27/18 63.3 kg (139 lb 8.8 oz)   07/23/18 59.9 kg (132 lb)                    ROS:  Remainder of ROS obtained and found to be negative other than that which was documented above      OBJECTIVE:     /68   Pulse 72   Temp 97.4  F (36.3  C) (Tympanic)   Resp 14   Ht 1.524 m (5')   Wt 57.6 kg (127 lb)   BMI 24.80 kg/m    Body mass index is 24.8 kg/m .  GENERAL: healthy, alert and no distress  EYES: Eyes grossly normal to inspection  RESP: lungs clear to auscultation - no rales, rhonchi or wheezes  CV: regular rates and rhythm, normal S1 S2, no S3 or S4 and no murmur, click or rub  ABDOMEN: soft, normal bowel sounds. No reboudn or guarding. Tender to palpation in LLQ    Diagnostic Test Results:  UA RESULTS:  Recent Labs   Lab Test 04/30/19  1309   COLOR Yellow   APPEARANCE Clear   URINEGLC Negative   URINEBILI Negative   URINEKETONE Negative   SG 1.020   UBLD Negative   URINEPH 6.0   PROTEIN Negative   UROBILINOGEN 0.2   NITRITE Positive*   LEUKEST Trace*   RBCU O - 2   WBCU 0 - 5         ASSESSMENT/PLAN:     (N30.00) Acute cystitis without hematuria  (primary encounter diagnosis)  Comment: no WBC but nitrites and given her history would rather empirically start treatment while awaiting  culture  Plan: cipro  Await culture  Get records from [facility]    (Z87.828) H/O kidney injury  Comment:   Plan: Comprehensive metabolic panel (BMP + Alb, Alk         Phos, ALT, AST, Total. Bili, TP), CRP,         inflammation, CBC with platelets and         differential, ciprofloxacin (CIPRO) 500 MG         tablet            (R35.0) Urine frequency  Comment:   Plan: *UA reflex to Microscopic and Culture ([place]         and [facility] (except [place] and         [place]), Urine Microscopic            (R82.90) Nonspecific finding on examination of urine  Comment:   Plan: Urine Culture Aerobic Bacterial            Patient frustrated and still not feeling well with reproducible pain in the LLQ. No red flags on exam but unclear if that pain is related to recent kidney injury or preceded the kidney injury. Considering CT but possible this was done at the outside hospital in which case I do not want to repeat tests/images that were done recently. Plan to treat given history and nitrites on exam. Will attempt to get records from outside hospital. Labs today - need to check Cr and make sure cipro does not need to be dose adjusted.   Follow up once we can obtain records and have our results. Unfortunately patient plans on driving back to [place] in a week so not sure what we can get done here before she returns        Jacquelin Sanchez PA-C  [facility]

## 2019-05-02 LAB
BACTERIA SPEC CULT: ABNORMAL
Lab: ABNORMAL
SPECIMEN SOURCE: ABNORMAL

## 2019-05-03 ENCOUNTER — OFFICE VISIT (OUTPATIENT)
Dept: FAMILY MEDICINE | Facility: CLINIC | Age: 64
End: 2019-05-03
Payer: COMMERCIAL

## 2019-05-03 VITALS
TEMPERATURE: 98 F | RESPIRATION RATE: 14 BRPM | BODY MASS INDEX: 24.94 KG/M2 | HEIGHT: 60 IN | SYSTOLIC BLOOD PRESSURE: 100 MMHG | HEART RATE: 98 BPM | WEIGHT: 127 LBS | DIASTOLIC BLOOD PRESSURE: 65 MMHG

## 2019-05-03 DIAGNOSIS — Z87.448 H/O PYELONEPHRITIS: Primary | ICD-10-CM

## 2019-05-03 DIAGNOSIS — Z87.440 HISTORY OF RECURRENT UTIS: ICD-10-CM

## 2019-05-03 PROCEDURE — 99213 OFFICE O/P EST LOW 20 MIN: CPT | Performed by: PHYSICIAN ASSISTANT

## 2019-05-03 ASSESSMENT — MIFFLIN-ST. JEOR: SCORE: 1057.57

## 2019-05-03 ASSESSMENT — PAIN SCALES - GENERAL: PAINLEVEL: NO PAIN (0)

## 2019-05-03 NOTE — PROGRESS NOTES
"  SUBJECTIVE:   Preeti Sherman is a 63 year old female who presents to clinic today for the following   health issues:      Patient is here today for a follow up from her visit on 4/30/19.        Additional history:     Still very concerned about the recent events  Still doesn't feel \"right\" although encouraged to know that we are checking and trying to do something as she doesn't feel she gets good care in [place]  DRiving back to [place] Monday or Tuesday of next week  Will be back up here in July when her daughter is due    Reviewed  and updated as needed this visit by clinical staff         Reviewed and updated as needed this visit by Provider         Current Outpatient Medications   Medication Sig Dispense Refill     acetaminophen (TYLENOL) 325 MG tablet Take 2 tablets (650 mg) by mouth every 4 hours as needed for mild pain 100 tablet 0     acetaminophen (TYLENOL) 500 MG tablet Take 2 tablets (1,000 mg) by mouth 3 times daily 100 tablet 0     aspirin 325 MG tablet Take 325 mg by mouth daily       Biotin 1 MG CAPS Take 1 mg by mouth daily       Biotin 1 MG CAPS Take 1 capsule by mouth daily        Calcium 250 MG CAPS Take 250 mg by mouth daily       Calcium 250 MG CAPS Take 1 capsule by mouth daily        citalopram (CELEXA) 20 MG tablet Take 20 mg by mouth daily       citalopram (CELEXA) 20 MG tablet Take 1/2 tablet (10 mg) for 1-2 weeks, then increase to 1 tablet orally daily (Patient taking differently: Take 20 mg by mouth every morning Take 1/2 tablet (10 mg) for 1-2 weeks, then increase to 1 tablet orally daily) 30 tablet 1     oxyCODONE IR (ROXICODONE) 5 MG tablet Take 1-2 tablets (5-10 mg) by mouth every 3 hours as needed (pain) 30 tablet 0     oxyCODONE IR (ROXICODONE) 5 MG tablet Take 1-2 tablets (5-10 mg) by mouth every 3 hours as needed for other (pain control or improvement in physical function. Hold dose for analgesic side effects.) 60 tablet 0     senna-docusate (SENOKOT-S;PERICOLACE) 8.6-50 MG per " tablet Take 1 tablet by mouth 2 times daily 100 tablet 0     senna-docusate (SENOKOT-S;PERICOLACE) 8.6-50 MG per tablet Take 1 tablet by mouth 2 times daily       TURMERIC CURCUMIN PO Take 1 capsule by mouth daily       TURMERIC CURCUMIN PO Take 1 capsule by mouth daily       Allergies   Allergen Reactions     Penicillins Itching     Patient notes she can take keflex  Astrid Marin PA-C      Penicillins Hives     Sulfa Drugs Itching     Sulfa Drugs Hives     BP Readings from Last 3 Encounters:   05/03/19 100/65   04/30/19 120/68   07/28/18 109/63    Wt Readings from Last 3 Encounters:   05/03/19 57.6 kg (127 lb)   04/30/19 57.6 kg (127 lb)   07/27/18 63.3 kg (139 lb 8.8 oz)                    ROS:  Remainder of ROS obtained and found to be negative other than that which was documented above      OBJECTIVE:     /65   Pulse 98   Temp 98  F (36.7  C) (Tympanic)   Resp 14   Ht 1.524 m (5')   Wt 57.6 kg (127 lb)   BMI 24.80 kg/m    Body mass index is 24.8 kg/m .  GENERAL: healthy, alert and no distress  RESP: lungs clear to auscultation - no rales, rhonchi or wheezes  CV: regular rates and rhythm, normal S1 S2, no S3 or S4 and no murmur, click or rub    Diagnostic Test Results:  UA pended for next week      ASSESSMENT/PLAN:     (Z87.448) H/O pyelonephritis  (primary encounter diagnosis)  Comment: patient will be coming back in July when her daughter is due - discussed scheduling an appointment now to follow up when she [place]  Plan: UROLOGY ADULT REFERRAL            (Z87.440) History of recurrent UTIs  Comment:   Plan: UROLOGY ADULT REFERRAL          Patient will drop a urine sample off next week before she leaves for [place] so we can ensure she has cleared her most recent infection      Jacquelin Sanchez PA-C  [facility]

## 2019-05-06 DIAGNOSIS — N39.0 RECURRENT UTI: Primary | ICD-10-CM

## 2019-05-06 PROCEDURE — 81001 URINALYSIS AUTO W/SCOPE: CPT | Performed by: PHYSICIAN ASSISTANT

## 2019-05-07 LAB
ALBUMIN UR-MCNC: NEGATIVE MG/DL
APPEARANCE UR: CLEAR
BILIRUB UR QL STRIP: NEGATIVE
COLOR UR AUTO: YELLOW
GLUCOSE UR STRIP-MCNC: NEGATIVE MG/DL
HGB UR QL STRIP: NEGATIVE
KETONES UR STRIP-MCNC: NEGATIVE MG/DL
LEUKOCYTE ESTERASE UR QL STRIP: NEGATIVE
NITRATE UR QL: NEGATIVE
PH UR STRIP: 5.5 PH (ref 5–7)
RBC #/AREA URNS AUTO: NORMAL /HPF
SOURCE: NORMAL
SP GR UR STRIP: 1.01 (ref 1–1.03)
UROBILINOGEN UR STRIP-ACNC: 0.2 EU/DL (ref 0.2–1)
WBC #/AREA URNS AUTO: NORMAL /HPF

## 2019-07-08 PROBLEM — M25.561 KNEE PAIN, RIGHT: Status: RESOLVED | Noted: 2018-08-16 | Resolved: 2019-07-08

## 2019-08-19 ENCOUNTER — APPOINTMENT (OUTPATIENT)
Dept: GENERAL RADIOLOGY | Facility: CLINIC | Age: 64
End: 2019-08-19
Attending: FAMILY MEDICINE
Payer: COMMERCIAL

## 2019-08-19 ENCOUNTER — APPOINTMENT (OUTPATIENT)
Dept: CT IMAGING | Facility: CLINIC | Age: 64
End: 2019-08-19
Attending: FAMILY MEDICINE
Payer: COMMERCIAL

## 2019-08-19 ENCOUNTER — NURSE TRIAGE (OUTPATIENT)
Dept: NURSING | Facility: CLINIC | Age: 64
End: 2019-08-19

## 2019-08-19 ENCOUNTER — HOSPITAL ENCOUNTER (EMERGENCY)
Facility: CLINIC | Age: 64
Discharge: HOME | End: 2019-08-19
Attending: FAMILY MEDICINE | Admitting: FAMILY MEDICINE
Payer: COMMERCIAL

## 2019-08-19 VITALS
TEMPERATURE: 97.8 F | RESPIRATION RATE: 16 BRPM | DIASTOLIC BLOOD PRESSURE: 97 MMHG | OXYGEN SATURATION: 95 % | SYSTOLIC BLOOD PRESSURE: 134 MMHG

## 2019-08-19 DIAGNOSIS — J18.9 PNEUMONIA OF LEFT LOWER LOBE DUE TO INFECTIOUS ORGANISM: ICD-10-CM

## 2019-08-19 DIAGNOSIS — R51.9 NONINTRACTABLE HEADACHE, UNSPECIFIED CHRONICITY PATTERN, UNSPECIFIED HEADACHE TYPE: ICD-10-CM

## 2019-08-19 DIAGNOSIS — R35.0 URINARY FREQUENCY: ICD-10-CM

## 2019-08-19 LAB
ALBUMIN SERPL-MCNC: 3.5 G/DL (ref 3.4–5)
ALBUMIN UR-MCNC: NEGATIVE MG/DL
ALP SERPL-CCNC: 80 U/L (ref 40–150)
ALT SERPL W P-5'-P-CCNC: 73 U/L (ref 0–50)
ANION GAP SERPL CALCULATED.3IONS-SCNC: 8 MMOL/L (ref 3–14)
APPEARANCE UR: ABNORMAL
AST SERPL W P-5'-P-CCNC: 56 U/L (ref 0–45)
BASOPHILS # BLD AUTO: 0 10E9/L (ref 0–0.2)
BASOPHILS NFR BLD AUTO: 0.6 %
BILIRUB SERPL-MCNC: 0.5 MG/DL (ref 0.2–1.3)
BILIRUB UR QL STRIP: NEGATIVE
BUN SERPL-MCNC: 13 MG/DL (ref 7–30)
CALCIUM SERPL-MCNC: 8.4 MG/DL (ref 8.5–10.1)
CHLORIDE SERPL-SCNC: 106 MMOL/L (ref 94–109)
CO2 SERPL-SCNC: 23 MMOL/L (ref 20–32)
COLOR UR AUTO: YELLOW
CREAT SERPL-MCNC: 0.81 MG/DL (ref 0.52–1.04)
DIFFERENTIAL METHOD BLD: ABNORMAL
EOSINOPHIL # BLD AUTO: 0 10E9/L (ref 0–0.7)
EOSINOPHIL NFR BLD AUTO: 0.1 %
ERYTHROCYTE [DISTWIDTH] IN BLOOD BY AUTOMATED COUNT: 14 % (ref 10–15)
GFR SERPL CREATININE-BSD FRML MDRD: 77 ML/MIN/{1.73_M2}
GLUCOSE SERPL-MCNC: 123 MG/DL (ref 70–99)
GLUCOSE UR STRIP-MCNC: NEGATIVE MG/DL
HCT VFR BLD AUTO: 41.4 % (ref 35–47)
HGB BLD-MCNC: 13.6 G/DL (ref 11.7–15.7)
HGB UR QL STRIP: ABNORMAL
IMM GRANULOCYTES # BLD: 0 10E9/L (ref 0–0.4)
IMM GRANULOCYTES NFR BLD: 0.4 %
KETONES UR STRIP-MCNC: NEGATIVE MG/DL
LEUKOCYTE ESTERASE UR QL STRIP: NEGATIVE
LIPASE SERPL-CCNC: 123 U/L (ref 73–393)
LYMPHOCYTES # BLD AUTO: 0.5 10E9/L (ref 0.8–5.3)
LYMPHOCYTES NFR BLD AUTO: 7.1 %
MCH RBC QN AUTO: 29.6 PG (ref 26.5–33)
MCHC RBC AUTO-ENTMCNC: 32.9 G/DL (ref 31.5–36.5)
MCV RBC AUTO: 90 FL (ref 78–100)
MONOCYTES # BLD AUTO: 0.4 10E9/L (ref 0–1.3)
MONOCYTES NFR BLD AUTO: 5.8 %
NEUTROPHILS # BLD AUTO: 5.9 10E9/L (ref 1.6–8.3)
NEUTROPHILS NFR BLD AUTO: 86 %
NITRATE UR QL: NEGATIVE
NRBC # BLD AUTO: 0 10*3/UL
NRBC BLD AUTO-RTO: 0 /100
PH UR STRIP: 5 PH (ref 5–7)
PLATELET # BLD AUTO: 228 10E9/L (ref 150–450)
POTASSIUM SERPL-SCNC: 3.6 MMOL/L (ref 3.4–5.3)
PROT SERPL-MCNC: 7.1 G/DL (ref 6.8–8.8)
RBC # BLD AUTO: 4.59 10E12/L (ref 3.8–5.2)
SODIUM SERPL-SCNC: 137 MMOL/L (ref 133–144)
SOURCE: ABNORMAL
SP GR UR STRIP: 1.02 (ref 1–1.03)
UROBILINOGEN UR STRIP-MCNC: 0 MG/DL (ref 0–2)
WBC # BLD AUTO: 6.9 10E9/L (ref 4–11)

## 2019-08-19 PROCEDURE — 25000128 H RX IP 250 OP 636: Performed by: FAMILY MEDICINE

## 2019-08-19 PROCEDURE — 74176 CT ABD & PELVIS W/O CONTRAST: CPT

## 2019-08-19 PROCEDURE — 96375 TX/PRO/DX INJ NEW DRUG ADDON: CPT

## 2019-08-19 PROCEDURE — 96376 TX/PRO/DX INJ SAME DRUG ADON: CPT

## 2019-08-19 PROCEDURE — 83690 ASSAY OF LIPASE: CPT | Performed by: FAMILY MEDICINE

## 2019-08-19 PROCEDURE — 96374 THER/PROPH/DIAG INJ IV PUSH: CPT

## 2019-08-19 PROCEDURE — 96361 HYDRATE IV INFUSION ADD-ON: CPT

## 2019-08-19 PROCEDURE — 85025 COMPLETE CBC W/AUTO DIFF WBC: CPT | Performed by: FAMILY MEDICINE

## 2019-08-19 PROCEDURE — 99284 EMERGENCY DEPT VISIT MOD MDM: CPT | Mod: 25

## 2019-08-19 PROCEDURE — 71046 X-RAY EXAM CHEST 2 VIEWS: CPT

## 2019-08-19 PROCEDURE — 81003 URINALYSIS AUTO W/O SCOPE: CPT | Performed by: FAMILY MEDICINE

## 2019-08-19 PROCEDURE — 80053 COMPREHEN METABOLIC PANEL: CPT | Performed by: FAMILY MEDICINE

## 2019-08-19 PROCEDURE — 99284 EMERGENCY DEPT VISIT MOD MDM: CPT | Mod: Z6 | Performed by: FAMILY MEDICINE

## 2019-08-19 PROCEDURE — 25800030 ZZH RX IP 258 OP 636: Performed by: FAMILY MEDICINE

## 2019-08-19 RX ORDER — METOCLOPRAMIDE HYDROCHLORIDE 5 MG/ML
5 INJECTION INTRAMUSCULAR; INTRAVENOUS ONCE
Status: COMPLETED | OUTPATIENT
Start: 2019-08-19 | End: 2019-08-19

## 2019-08-19 RX ORDER — KETOROLAC TROMETHAMINE 15 MG/ML
15 INJECTION, SOLUTION INTRAMUSCULAR; INTRAVENOUS ONCE
Status: COMPLETED | OUTPATIENT
Start: 2019-08-19 | End: 2019-08-19

## 2019-08-19 RX ORDER — CEFPROZIL 500 MG/1
500 TABLET, FILM COATED ORAL 2 TIMES DAILY
Qty: 14 TABLET | Refills: 0 | Status: SHIPPED | OUTPATIENT
Start: 2019-08-19 | End: 2019-08-26

## 2019-08-19 RX ORDER — AZITHROMYCIN 250 MG/1
TABLET, FILM COATED ORAL
Qty: 6 TABLET | Refills: 0 | Status: SHIPPED | OUTPATIENT
Start: 2019-08-19 | End: 2019-08-24

## 2019-08-19 RX ORDER — SODIUM CHLORIDE 9 MG/ML
1000 INJECTION, SOLUTION INTRAVENOUS CONTINUOUS
Status: DISCONTINUED | OUTPATIENT
Start: 2019-08-19 | End: 2019-08-19 | Stop reason: HOSPADM

## 2019-08-19 RX ADMIN — METOCLOPRAMIDE 5 MG: 5 INJECTION, SOLUTION INTRAMUSCULAR; INTRAVENOUS at 08:51

## 2019-08-19 RX ADMIN — KETOROLAC TROMETHAMINE 15 MG: 15 INJECTION, SOLUTION INTRAMUSCULAR; INTRAVENOUS at 09:25

## 2019-08-19 RX ADMIN — SODIUM CHLORIDE 1000 ML: 9 INJECTION, SOLUTION INTRAVENOUS at 08:48

## 2019-08-19 RX ADMIN — KETOROLAC TROMETHAMINE 15 MG: 15 INJECTION, SOLUTION INTRAMUSCULAR; INTRAVENOUS at 11:28

## 2019-08-19 RX ADMIN — SODIUM CHLORIDE 1000 ML: 9 INJECTION, SOLUTION INTRAVENOUS at 11:27

## 2019-08-19 RX ADMIN — METOCLOPRAMIDE 5 MG: 5 INJECTION, SOLUTION INTRAMUSCULAR; INTRAVENOUS at 11:27

## 2019-08-19 SDOH — HEALTH STABILITY: MENTAL HEALTH: HOW OFTEN DO YOU HAVE A DRINK CONTAINING ALCOHOL?: NEVER

## 2019-08-19 ASSESSMENT — ENCOUNTER SYMPTOMS
COUGH: 0
BLOOD IN STOOL: 0
NAUSEA: 1
SINUS PRESSURE: 0
DIAPHORESIS: 0
FEVER: 0
DIARRHEA: 0
HEADACHES: 1
PALPITATIONS: 0
ABDOMINAL PAIN: 0
CHILLS: 0
FREQUENCY: 1
SORE THROAT: 0
VOMITING: 1
CONSTIPATION: 0
SHORTNESS OF BREATH: 0
FATIGUE: 1
DYSURIA: 0
WHEEZING: 0
PHOTOPHOBIA: 0

## 2019-08-19 NOTE — ED AVS SNAPSHOT
[facility]  [address] [address]  Phone:  [phone]  Fax:  [phone]                                    Preeti Sherman   MRN: [id number]    Department:  [facility]   Date of Visit:  8/19/2019           After Visit Summary Signature Page    I have received my discharge instructions, and my questions have been answered. I have discussed any challenges I see with this plan with the nurse or doctor.    ..........................................................................................................................................  Patient/Patient Representative Signature      ..........................................................................................................................................  Patient Representative Print Name and Relationship to Patient    ..................................................               ................................................  Date                                   Time    ..........................................................................................................................................  Reviewed by Signature/Title    ...................................................              ..............................................  Date                                               Time          22EPIC Rev 08/18

## 2019-08-19 NOTE — DISCHARGE INSTRUCTIONS
ICD-10-CM    1. Pneumonia of left lower lobe due to infectious organism (H) J18.1     cefzil twice daily for 7 days. take azithromycine for 5 days.  recheck 1 week. return for worsrning. stay hydrated.  follow-up chest imaging to show complete clearance on xray or CT (by 3 weeks).   2. Urinary frequency R35.0     no signs of infection.  no signs of elevated blood sugars   3. Nonintractable headache, unspecified chronicity pattern, unspecified headache type R51     resume home management

## 2019-08-19 NOTE — ED TRIAGE NOTES
Pt here with what she says is a possible bladder infection. Pt states that she is having a terrible headache. Pt is in tears while explaining. Pt has had kidney failure in the past and was in hospital for 8 days in [place] where she lives.

## 2019-08-19 NOTE — ED PROVIDER NOTES
History     Chief Complaint   Patient presents with     Cystitis     HPI  Preeti Sherman is a 63 year old female who presents with a history of travel from [place] where she resides.  She is currently visiting family here for a delivery of a grandchild when she developed urinary tract symptoms frequency over the last week.  This was accompanied by urgency without hematuria and without dysuria.  No back pain.  Feels febrile but temp is normal here.  She has associated malaise.  Also with a headache today without associated neurologic changes and without light sensitivity but one episode of emesis prior to arriving here.  She has had prior recurrent UTIs and any ureteral stone within the last year that was 3 mm.  She has no current flank pain.  She was on Macrodantin once daily prophylaxis as well as topical estrogen for atrophic vaginitis as a possible cause for recurrent UTI.  She has no underlying diabetes.    Allergies:  Allergies   Allergen Reactions     Penicillins Itching     Patient notes she can take keflex  Astrid Pankratz, PA-C      Penicillins Hives     Sulfa Drugs Itching     Sulfa Drugs Hives       Problem List:    Patient Active Problem List    Diagnosis Date Noted     Postoperative fever 07/27/2018     Priority: Medium     S/P total knee arthroplasty, right 07/24/2018     Priority: Medium     Right knee DJD 07/23/2018     Priority: Medium     History of hepatitis B 07/20/2015     Priority: Medium     Hyperlipidemia LDL goal <130 03/19/2012     Priority: Medium     Major depression in partial remission (H) 06/30/2011     Priority: Medium      quadraplegic from accident now for over 10 years. Does well with lexapro.       CARDIOVASCULAR SCREENING; LDL GOAL LESS THAN 160 06/30/2011     Priority: Medium     Disorder of bursae and tendons in shoulder region 07/03/2007     Priority: Medium     Problem list name updated by automated process. 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     HYSTERECTOMY  1988     shoulder  2007    Left rotator cuff repair.     URETHROPLASTY         Family History:    Family History   Problem Relation Age of Onset     Cancer Maternal Grandmother         Brain     Cancer Maternal Grandfather         Lung and throat cancer       Social History:  Marital Status:   [2]  Social History     Tobacco Use     Smoking status: Never Smoker     Smokeless tobacco: Never Used   Substance Use Topics     Alcohol use: Never     Frequency: Never     Comment: Socially; 0-1 drinks per week     Drug use: No        Medications:      acetaminophen (TYLENOL) 325 MG tablet   acetaminophen (TYLENOL) 500 MG tablet   aspirin 325 MG tablet   Biotin 1 MG CAPS   Biotin 1 MG CAPS   Calcium 250 MG CAPS   Calcium 250 MG CAPS   citalopram (CELEXA) 20 MG tablet   citalopram (CELEXA) 20 MG tablet   oxyCODONE IR (ROXICODONE) 5 MG tablet   oxyCODONE IR (ROXICODONE) 5 MG tablet   senna-docusate (SENOKOT-S;PERICOLACE) 8.6-50 MG per tablet   senna-docusate (SENOKOT-S;PERICOLACE) 8.6-50 MG per tablet   TURMERIC CURCUMIN PO   TURMERIC CURCUMIN PO         Review of Systems   Constitutional: Positive for fatigue. Negative for chills, diaphoresis and fever.   HENT: Negative for ear pain, sinus pressure and sore throat.    Eyes: Negative for photophobia and visual disturbance.   Respiratory: Negative for cough, shortness of breath and wheezing.    Cardiovascular: Negative for chest pain and palpitations.   Gastrointestinal: Positive for nausea and vomiting. Negative for  abdominal pain, blood in stool, constipation and diarrhea.   Genitourinary: Positive for frequency and urgency. Negative for dysuria.   Skin: Negative for rash.   Neurological: Positive for headaches.   All other systems reviewed and are negative.      Physical Exam   BP: (!) 134/97  Heart Rate: 82  Temp: 99.3  F (37.4  C)  Resp: 16  SpO2: 95 %      Physical Exam   Constitutional: She appears distressed.   HENT:   Mouth/Throat: Oropharynx is clear and moist.   Eyes: Conjunctivae are normal.   Neck: Neck supple.   Cardiovascular: Normal rate, regular rhythm and normal heart sounds. Exam reveals no friction rub.   No murmur heard.  Pulmonary/Chest: Effort normal and breath sounds normal. No stridor. No respiratory distress. She has no wheezes.   Abdominal: Soft. Bowel sounds are normal. She exhibits no distension and no mass. There is tenderness (mild left side). There is no guarding.   Musculoskeletal: She exhibits no edema.   Neurological: She is alert.   Skin: No rash noted. She is not diaphoretic. No pallor.       ED Course        Procedures               Critical Care time:  none               Results for orders placed or performed during the hospital encounter of 08/19/19 (from the past 24 hour(s))   CBC with platelets differential   Result Value Ref Range    WBC 6.9 4.0 - 11.0 10e9/L    RBC Count 4.59 3.8 - 5.2 10e12/L    Hemoglobin 13.6 11.7 - 15.7 g/dL    Hematocrit 41.4 35.0 - 47.0 %    MCV 90 78 - 100 fl    MCH 29.6 26.5 - 33.0 pg    MCHC 32.9 31.5 - 36.5 g/dL    RDW 14.0 10.0 - 15.0 %    Platelet Count 228 150 - 450 10e9/L    Diff Method Automated Method     % Neutrophils 86.0 %    % Lymphocytes 7.1 %    % Monocytes 5.8 %    % Eosinophils 0.1 %    % Basophils 0.6 %    % Immature Granulocytes 0.4 %    Nucleated RBCs 0 0 /100    Absolute Neutrophil 5.9 1.6 - 8.3 10e9/L    Absolute Lymphocytes 0.5 (L) 0.8 - 5.3 10e9/L    Absolute Monocytes 0.4 0.0 - 1.3 10e9/L    Absolute Eosinophils 0.0 0.0 - 0.7 10e9/L     Absolute Basophils 0.0 0.0 - 0.2 10e9/L    Abs Immature Granulocytes 0.0 0 - 0.4 10e9/L    Absolute Nucleated RBC 0.0    Comprehensive metabolic panel   Result Value Ref Range    Sodium 137 133 - 144 mmol/L    Potassium 3.6 3.4 - 5.3 mmol/L    Chloride 106 94 - 109 mmol/L    Carbon Dioxide 23 20 - 32 mmol/L    Anion Gap 8 3 - 14 mmol/L    Glucose 123 (H) 70 - 99 mg/dL    Urea Nitrogen 13 7 - 30 mg/dL    Creatinine 0.81 0.52 - 1.04 mg/dL    GFR Estimate 77 >60 mL/min/[1.73_m2]    GFR Estimate If Black 89 >60 mL/min/[1.73_m2]    Calcium 8.4 (L) 8.5 - 10.1 mg/dL    Bilirubin Total 0.5 0.2 - 1.3 mg/dL    Albumin 3.5 3.4 - 5.0 g/dL    Protein Total 7.1 6.8 - 8.8 g/dL    Alkaline Phosphatase 80 40 - 150 U/L    ALT 73 (H) 0 - 50 U/L    AST 56 (H) 0 - 45 U/L   UA without Microscopic   Result Value Ref Range    Color Urine Yellow     Appearance Urine Slightly Cloudy     Glucose Urine Negative NEG^Negative mg/dL    Bilirubin Urine Negative NEG^Negative    Ketones Urine Negative NEG^Negative mg/dL    Specific Gravity Urine 1.020 1.003 - 1.035    Blood Urine Small (A) NEG^Negative    pH Urine 5.0 5.0 - 7.0 pH    Protein Albumin Urine Negative NEG^Negative mg/dL    Urobilinogen mg/dL 0.0 0.0 - 2.0 mg/dL    Nitrite Urine Negative NEG^Negative    Leukocyte Esterase Urine Negative NEG^Negative    Source Midstream Urine    Lipase   Result Value Ref Range    Lipase 123 73 - 393 U/L       Medications   0.9% sodium chloride BOLUS (has no administration in time range)   sodium chloride 0.9% infusion (has no administration in time range)   metoclopramide (REGLAN) injection 5 mg (has no administration in time range)       Assessments & Plan (with Medical Decision Making)     Presents with a history of recurrent UTI and recently on Macrodantin prophylaxis until the last week when she developed a sense of urine frequency urgency without hematuria or dysuria no back pain.  Also with a recent history of ureterolithiasis 3 mm stones she  passed.  Resented with some suprapubic discomfort no associated fever although some sense of tactile warmth.  Underwent urinalysis which was reassuring CT abdomen pelvis to evaluate for possible recurrent stone, chemistry panel CBC all read reassuring.  Differential diagnosis of her symptoms might include UTI, pyelonephritis, recurrent ureteral stone all evaluated for today.  Also diabetes with hyperglycemia.  No serious findings on laboratory testing.  She does have mild elevations of her ALT and AST likely consistent with a recently diagnosed fatty liver disease.  No other concerns today.    However her CT of the abdomen demonstrated a possible infiltrate in the left lower lobe and on chest x-ray this is also confirmed as a pneumonia.  She will be treated as such as below.  This is a surprise based on her recent history although her malaise sensation could be related to this.  She currently has no respiratory symptoms.  We discussed that chest x-ray must be completed to confirm that her infiltrate clears lung masses may-give a similar appearance.  Discussed follow-up in clinic.  Precautions given for return.       I have reviewed the nursing notes.    I have reviewed the findings, diagnosis, plan and need for follow up with the patient.       New Prescriptions    No medications on file       Final diagnoses:   Pneumonia of left lower lobe due to infectious organism (H) - cefzil twice daily for 7 days. take azithromycine for 5 days.  recheck 1 week. return for worsrning. stay hydrated.  follow-up chest imaging to show complete clearance on xray or CT (by 3 weeks).   Urinary frequency - no signs of infection.  no signs of elevated blood sugars   Nonintractable headache, unspecified chronicity pattern, unspecified headache type - resume home management       8/19/2019   [facility] EMERGENCY DEPARTMENT     Luigi Sharpe MD  08/19/19 [id number]

## 2019-08-19 NOTE — TELEPHONE ENCOUNTER
Patient states she is visiting her from [place].  Was hospitalized in February of this year with renal failure and now feels the same way.  Will go to ED.

## 2019-11-03 ENCOUNTER — HEALTH MAINTENANCE LETTER (OUTPATIENT)
Age: 64
End: 2019-11-03

## 2019-11-26 ENCOUNTER — TELEPHONE (OUTPATIENT)
Dept: FAMILY MEDICINE | Facility: CLINIC | Age: 64
End: 2019-11-26

## 2019-11-26 DIAGNOSIS — Z12.31 ENCOUNTER FOR SCREENING MAMMOGRAM FOR BREAST CANCER: Primary | ICD-10-CM

## 2019-11-26 NOTE — LETTER
November 26, 2019      [address]reeti Sherman  [address] [facility] [address]        Dear Preeti,     As part of [place]'s commitment to health and wellness we have reviewed your chart and it indicates that you are due for one or more of the following:    -- Mammogram.  Please call one of the following numbers to schedule:  ** [facility] [phone] (at [facility] once a month)  [facility] [phone]  [facility] [phone]  [facility] [phone]  [facility] [phone]  [facility] [phone]        Please try to schedule and/or complete the tests above within the next 2-4 weeks.   The number to call to schedule an appointment at [facility] is [phone].    While we work hard to maintain accurate records, it is always possible that this notice does not accurately reflect tests that you may have had. To ensure that we do not send you unnecessary notices please verify that we have accurate dates of your tests (even if these were done many years ago) or if you are seeking care at another clinic.      Sincerely,     Jacquelin Sanchez PA-C, Mekhi CMA

## 2019-11-26 NOTE — TELEPHONE ENCOUNTER
Panel Management Review      Patient has the following on her problem list: None      Composite cancer screening  Chart review shows that this patient is due/due soon for the following Mammogram  Summary:    Patient is due/failing the following:   MAMMOGRAM    Action needed:   Patient needs referral/order: Mammogram     Type of outreach:    Sent letter.    Questions for provider review:    None                                                                                                                                    Mekhi Deluna CMA       Chart routed to self.

## 2020-06-17 ENCOUNTER — NURSE TRIAGE (OUTPATIENT)
Dept: NURSING | Facility: CLINIC | Age: 65
End: 2020-06-17

## 2020-06-17 NOTE — TELEPHONE ENCOUNTER
"\"I think I may have a UTI\".  Caller is reporting cloudy urine, urgency and frequency.    Warm transfer to scheduling to make an appointment.    COVID 19 Nurse Triage Plan/Patient Instructions    Please be aware that novel coronavirus (COVID-19) may be circulating in the community. If you develop symptoms such as fever, cough, or SOB or if you have concerns about the presence of another infection including coronavirus (COVID-19), please contact your health care provider or visit [email or web address].     Disposition/Instructions    Patient to schedule an In Person Visit with provider. Reference Visit Selection Guide.    Thank you for taking steps to prevent the spread of this virus.  o Limit your contact with others.  o Wear a simple mask to cover your cough.  o Wash your hands well and often.    Resources    [facility]: About COVID-19: [email or web address]    CDC: What to Do If You're Sick: www.cdc.gov/coronavirus/2019-ncov/about/steps-when-sick.html    CDC: Ending Home Isolation: www.cdc.gov/coronavirus/2019-ncov/hcp/disposition-in-home-patients.html     CDC: Caring for Someone: www.cdc.gov/coronavirus/2019-ncov/if-you-are-sick/care-for-someone.html     [facility]: Interim Guidance for Hospital Discharge to Home: www.health.[facility].[place].us/diseases/coronavirus/hcp/hospdischarge.pdf    [facility] clinical trials (COVID-19 research studies): [email or web address]     Below are the COVID-19 hotlines at the [facility] of [facility] ([facility]). Interpreters are available.   o For health questions: Call 874-258-1019 or 1-691.596.1341 (7 a.m. to 7 p.m.)  o For questions about schools and childcare: Call 086-616-6691 or 1-312.762.1350 (7 a.m. to 7 p.m.)       Additional Information    Negative: Shock suspected (e.g., cold/pale/clammy skin, too weak to stand, low BP, rapid pulse)    Negative: Sounds like a life-threatening emergency to the triager    Negative: Followed a genital area " injury    Negative: Followed a genital area injury (penis, scrotum)    Negative: Vaginal discharge    Negative: Pus (white, yellow) or bloody discharge from end of penis    Negative: [1] Taking antibiotic for urinary tract infection (UTI) AND [2] female    Negative: [1] Taking antibiotic for urinary tract infection (UTI) AND [2] male    Negative: [1] Discomfort (pain, burning or stinging) when passing urine AND [2] pregnant    Negative: [1] Discomfort (pain, burning or stinging) when passing urine AND [2] postpartum (from 0 to 6 weeks after delivery)    Negative: [1] Discomfort (pain, burning or stinging) when passing urine AND [2] female    Negative: [1] Discomfort (pain, burning or stinging) when passing urine AND [2] male    Negative: Pain or itching in the vulvar area    Negative: Pain in scrotum is main symptom    Negative: Blood in the urine is main symptom    Negative: Symptoms arising from use of a urinary catheter ([place] or [place])    Negative: [1] Unable to urinate (or only a few drops) > 4 hours AND     [2] bladder feels very full (e.g., palpable bladder or strong urge to urinate)    Negative: [1] Decreased urination and [2] drinking very little AND [2] dehydration suspected (e.g., dark urine, no urine > 12 hours, very dry mouth, very lightheaded)    Negative: Patient sounds very sick or weak to the triager    Negative: Fever > 100.5 F (38.1 C)    Negative: Side (flank) or lower back pain present    Negative: [1] Can't control passage of urine (i.e., urinary incontinence) AND [2] new onset (< 2 weeks) or worsening    Urinating more frequently than usual (i.e., frequency)    Protocols used: URINARY SYMPTOMS-JOSHUA-HERIBERTO Pack RN  [facility]

## 2020-11-16 ENCOUNTER — HEALTH MAINTENANCE LETTER (OUTPATIENT)
Age: 65
End: 2020-11-16

## 2021-05-11 ENCOUNTER — OFFICE VISIT (OUTPATIENT)
Dept: FAMILY MEDICINE | Facility: CLINIC | Age: 66
End: 2021-05-11
Payer: COMMERCIAL

## 2021-05-11 VITALS
OXYGEN SATURATION: 98 % | BODY MASS INDEX: 26.5 KG/M2 | WEIGHT: 135 LBS | HEIGHT: 60 IN | HEART RATE: 57 BPM | TEMPERATURE: 97.3 F | SYSTOLIC BLOOD PRESSURE: 118 MMHG | DIASTOLIC BLOOD PRESSURE: 66 MMHG

## 2021-05-11 DIAGNOSIS — R35.0 URINE FREQUENCY: ICD-10-CM

## 2021-05-11 DIAGNOSIS — F33.41 RECURRENT MAJOR DEPRESSIVE DISORDER, IN PARTIAL REMISSION (H): ICD-10-CM

## 2021-05-11 DIAGNOSIS — N30.00 ACUTE CYSTITIS WITHOUT HEMATURIA: Primary | ICD-10-CM

## 2021-05-11 DIAGNOSIS — R82.90 NONSPECIFIC FINDING ON EXAMINATION OF URINE: ICD-10-CM

## 2021-05-11 LAB
ALBUMIN UR-MCNC: NEGATIVE MG/DL
APPEARANCE UR: ABNORMAL
BACTERIA #/AREA URNS HPF: ABNORMAL /HPF
BILIRUB UR QL STRIP: NEGATIVE
COLOR UR AUTO: YELLOW
GLUCOSE UR STRIP-MCNC: NEGATIVE MG/DL
HGB UR QL STRIP: NEGATIVE
KETONES UR STRIP-MCNC: NEGATIVE MG/DL
LEUKOCYTE ESTERASE UR QL STRIP: ABNORMAL
NITRATE UR QL: POSITIVE
PH UR STRIP: 6 PH (ref 5–7)
RBC #/AREA URNS AUTO: ABNORMAL /HPF
SOURCE: ABNORMAL
SP GR UR STRIP: 1.02 (ref 1–1.03)
UROBILINOGEN UR STRIP-ACNC: 0.2 EU/DL (ref 0.2–1)
WBC #/AREA URNS AUTO: ABNORMAL /HPF

## 2021-05-11 PROCEDURE — 81001 URINALYSIS AUTO W/SCOPE: CPT | Performed by: PHYSICIAN ASSISTANT

## 2021-05-11 PROCEDURE — 87088 URINE BACTERIA CULTURE: CPT | Performed by: PHYSICIAN ASSISTANT

## 2021-05-11 PROCEDURE — 87086 URINE CULTURE/COLONY COUNT: CPT | Performed by: PHYSICIAN ASSISTANT

## 2021-05-11 PROCEDURE — 87186 SC STD MICRODIL/AGAR DIL: CPT | Performed by: PHYSICIAN ASSISTANT

## 2021-05-11 PROCEDURE — 99214 OFFICE O/P EST MOD 30 MIN: CPT | Performed by: PHYSICIAN ASSISTANT

## 2021-05-11 RX ORDER — ESCITALOPRAM OXALATE 20 MG/1
20 TABLET ORAL DAILY
Qty: 90 TABLET | Refills: 1 | Status: SHIPPED | OUTPATIENT
Start: 2021-05-11 | End: 2022-08-22

## 2021-05-11 RX ORDER — CIPROFLOXACIN 500 MG/1
500 TABLET, FILM COATED ORAL 2 TIMES DAILY
Qty: 14 TABLET | Refills: 0 | Status: SHIPPED | OUTPATIENT
Start: 2021-05-11 | End: 2021-05-18

## 2021-05-11 ASSESSMENT — MIFFLIN-ST. JEOR: SCORE: 1083.86

## 2021-05-11 ASSESSMENT — PAIN SCALES - GENERAL: PAINLEVEL: NO PAIN (0)

## 2021-05-11 NOTE — LETTER
May 17, 2021      Preeti Miriam Sherman  [address] [place] [address]        Dear ,    We are writing to inform you of your test results.    The urine did end up growing E coli which is a very common cause of UTI's. The antibiotic you started on should be very effective at clearing this so I'm hoping you are already feeling much better. If symptoms do NOT improve as expected, please follow up in clinic.     Jacquelin Sanchez PA-C/am    Resulted Orders   *UA reflex to Microscopic and Culture ([place] [facility] (except [place] and [place])   Result Value Ref Range    Color Urine Yellow     Appearance Urine Slightly Cloudy     Glucose Urine Negative NEG^Negative mg/dL    Bilirubin Urine Negative NEG^Negative    Ketones Urine Negative NEG^Negative mg/dL    Specific Gravity Urine 1.025 1.003 - 1.035    Blood Urine Negative NEG^Negative    pH Urine 6.0 5.0 - 7.0 pH    Protein Albumin Urine Negative NEG^Negative mg/dL    Urobilinogen Urine 0.2 0.2 - 1.0 EU/dL    Nitrite Urine Positive (A) NEG^Negative    Leukocyte Esterase Urine Small (A) NEG^Negative    Source Midstream Urine    Urine Culture Aerobic Bacterial   Result Value Ref Range    Specimen Description Midstream Urine     Special Requests Specimen received in preservative     Culture Micro >100,000 colonies/mL  Escherichia coli   (A)    Urine Microscopic   Result Value Ref Range    WBC Urine 10-25 (A) OTO5^0 - 5 /HPF    RBC Urine O - 2 OTO2^O - 2 /HPF    Bacteria Urine Many (A) NEG^Negative /HPF

## 2021-05-11 NOTE — PROGRESS NOTES
Assessment & Plan       (N30.00) Acute cystitis without hematuria  (primary encounter diagnosis)  Comment: treat based on preliminary UA - await culture. Based on last culture/sensitivities and her allergies, cipro chosen  Plan: ciprofloxacin (CIPRO) 500 MG tablet            (F33.41) Recurrent major depressive disorder, in partial remission (H)  Comment: left her script at home. Needs refills here  Plan: escitalopram (LEXAPRO) 20 MG tablet            (R35.0) Urine frequency  Comment:   Plan: *UA reflex to Microscopic and Culture ([place]         and [facility] (except [place] and         [place]), Urine Microscopic            (R82.90) Nonspecific finding on examination of urine  Comment:   Plan: Urine Culture Aerobic Bacterial                BMI:   Estimated body mass index is 26.37 kg/m  as calculated from the following:    Height as of this encounter: 1.524 m (5').    Weight as of this encounter: 61.2 kg (135 lb).     Return in about 1 week (around 5/18/2021) for If not improving or worsening.    SHANE Martel [facility] DELORES Kaplan is a 65 year old who presents for the following health issues   HPI     Genitourinary - Female  Onset/Duration: Ongoing for a while   Description:   Painful urination (Dysuria): no           Frequency: YES  Blood in urine (Hematuria): no  Delay in urine (Hesitency): no  Intensity: moderate  Progression of Symptoms:  worsening  Accompanying Signs & Symptoms:  Fever/chills: no  Flank pain: no  Nausea and vomiting: no  Vaginal symptoms: none  Abdominal/Pelvic Pain: no  History:   History of frequent UTI s: YES  History of kidney stones: YES- 2 years ago   Sexually Active: No  Possibility of pregnancy: No  Precipitating or alleviating factors: None  Therapies tried and outcome: Increase fluid intake    Medication Followup of Lexapro 20 mg     Taking Medication as prescribed: yes    Side Effects:  None    Medication Helping Symptoms:  yes  "  Mood has been stable on lexapro  Has helped since her 's accident (broke his neck in 1992, now a quadraplegic. They are  but she does not live with him and he stays in [place] when she goes to [place]). She states she doesn't get out of bed, doesn't go to the doctor so she has continued to live her own life      -Patient lives in [place] and is home here in [place] for the summer. She just got here yesterday and she left her Lexapro in [place] so she is wanting a refill today if possible.     Back in [place] for the summer visiting her daughter and nearly 1 yo granddaughter  Since our last visit did follow up with urology in [place]  They tried to put her on topical estrogen thinking that would reduce UTI frequency but she did not like the side effects so stopped (felt it made her horny)  Has been doing better overall  Feels like these current symptoms have been there for awhile but she has been ignoring them  Drove up here from [place] yesterday in her van/camper - had to urinate overnight so went in a bowl to dump it out - noticed \"white flakes\" in her urine which is why she decided it was time to come in    Review of Systems   Remainder of ROS obtained and found to be negative other than that which was documented above        Objective    /66   Pulse 57   Temp 97.3  F (36.3  C) (Tympanic)   Ht 1.524 m (5')   Wt 61.2 kg (135 lb)   SpO2 98%   BMI 26.37 kg/m    Body mass index is 26.37 kg/m .  Physical Exam   GENERAL: healthy, alert and no distress  ABDOMEN: soft, nontender and bowel sounds normal  PSYCH: mentation appears normal, affect normal/bright    Diagnostic Tests:   UA RESULTS:  Recent Labs   Lab Test 05/11/21  1005   COLOR Yellow   APPEARANCE Slightly Cloudy   URINEGLC Negative   URINEBILI Negative   URINEKETONE Negative   SG 1.025   UBLD Negative   URINEPH 6.0   PROTEIN Negative   UROBILINOGEN 0.2   NITRITE Positive*   LEUKEST Small*   RBCU O - 2   WBCU 10-25*             "

## 2021-05-12 LAB
BACTERIA SPEC CULT: ABNORMAL
Lab: ABNORMAL
SPECIMEN SOURCE: ABNORMAL

## 2021-05-24 ENCOUNTER — RECORDS - HEALTHEAST (OUTPATIENT)
Dept: ADMINISTRATIVE | Facility: CLINIC | Age: 66
End: 2021-05-24

## 2021-05-25 ENCOUNTER — RECORDS - HEALTHEAST (OUTPATIENT)
Dept: ADMINISTRATIVE | Facility: CLINIC | Age: 66
End: 2021-05-25

## 2021-08-10 NOTE — PROGRESS NOTES
[facility]    Facility:   [facility] [[id number]]   Code Status: FULL CODE  PCP: No Primary Care Provider   Phone: None   Fax: [phone]      CHIEF COMPLAINT/REASON FOR VISIT:  Chief Complaint   Patient presents with     Discharge Summary       HISTORY COURSE:  Preeti Pineda is a 62 y.o. female who was residing here at the [facility] from previous hospitalization which she had not right total knee arthroplasty.  She this was done secondary DJD was refractory to conservative management.  She then was discharged from the hospital and she was sent here to the TCU she had increased swelling and redness which was a concern for the family.  She then went into the hospital and was worked up very thoroughly no real infection was found.  She was then sent back here to the TCU which her pain is well-managed and she is moving her bowels currently she denies chills and fever coughing wheezing chest pain dizziness or vertigo nausea vomiting diarrhea or flulike symptoms headaches or stiff neck without difficulty.  She has no new issues and her depression is well controlled.  She was treated probably and transferred here to the TCU in stable condition.  She has done well from a therapy standpoint at this point will be discharging home August 4, 2018 without any services.  Her follow-up visit with orthopedics is August 6.  She is able to flex at 90 .  Able to use a 2 wheeled walker independently.  For pain she is on scheduled Tylenol thousand milligrams 3 times a day also can have oxycodone as needed usually takes for 5 doses per day.  She has been normotensive afebrile room air normal appetite.    Review of Systems  Currently no reports of fever chills fatigue cough or cold flulike symptoms nausea vomiting diarrhea dysuria headache stiff neck or swollen glands.  Vitals:    08/02/18 0959   BP: 101/65   Pulse: 80   Resp: 18   Temp: 98.4  F (36.9  C)   SpO2: 97%        Physical Exam   Constitutional: No distress.   Neck: Neck supple. No thyromegaly present.   Cardiovascular: Normal rate, regular rhythm and normal heart sounds.    Pulmonary/Chest: Breath sounds normal.   Abdominal: Bowel sounds are normal. There is no tenderness. There is no guarding.   Musculoskeletal:   Left knee suture line appropriate no infection.  Able to flex at 90 .  Independent with a walker   Lymphadenopathy:     She has no cervical adenopathy.   Neurological: She is alert.   Skin: Skin is warm and dry. No rash noted.   Psychiatric: Her behavior is normal.     No results found for: WBC, HGB, HCT, MCV, PLT    MEDICATION LIST:  Current Outpatient Prescriptions   Medication Sig     acetaminophen (TYLENOL) 325 MG tablet Take 650 mg by mouth every 4 (four) hours as needed for pain.     aspirin 325 MG tablet Take 325 mg by mouth daily.     biotin 1 mg cap Take 1 mg by mouth daily.     calcium carbonate (OS-LATOYA) 250 mg calcium (625 mg) Take 1 tablet by mouth daily.     citalopram (CELEXA) 20 MG tablet Take 10 mg by mouth daily.     oxyCODONE (ROXICODONE) 5 MG immediate release tablet Take 5-10 mg by mouth every 3 (three) hours as needed for pain.     senna-docusate (SENNOSIDES-DOCUSATE SODIUM) 8.6-50 mg tablet Take 1 tablet by mouth 2 (two) times a day.       DISCHARGE DIAGNOSIS:    ICD-10-CM    1. OA (osteoarthritis) of knee M17.10    2. S/P total knee arthroplasty, left Z96.652    3. Pain management R52    4. Depression F32.9        MEDICAL EQUIPMENT NEEDS:  none    DISCHARGE PLAN/FACE TO FACE:  I certify that services are/were furnished while this patient was under the care of a physician and that a physician or an allowed non-physician practitioner (NPP), had a face-to-face encounter that meets the physician face-to-face encounter requirements. The encounter was in whole, or in part, related to the primary reason for home health. The patient is confined to his/her home and needs intermittent skilled  nursing, physical therapy, speech-language pathology, or the continued need for occupational therapy. A plan of care has been established by a physician and is periodically reviewed by a physician.  Date of Face-to-Face Encounter: August 2, 2018    I certify that, based on my findings, the following services are medically necessary home health services: Discharging home with current medications and narcotics without any services    My clinical findings support the need for the above skilled services because: (Please write a brief narrative summary that describes what the RN, PT, SLP, or other services will be doing in the home. A list of diagnoses in this section does not meet the CMS requirements.)  She will not require any skilled services    This patient is homebound because: (Please write a brief narrative summary describing the functional limitations as to why this patient is homebound and specifically what makes this patient homebound.)  She will not be homebound    The patient is, or has been, under my care and I have initiated the establishment of the plan of care. This patient will be followed by a physician who will periodically review the plan of care.    Schedule follow up visit with primary care provider within 7 days to reestablish care.  She will follow-up with orthopedics for follow-up on August 6, 2018.  She may also at some point want to do outpatient rehabilitation.  She will follow-up with her primary care doctor regarding medication management and any future laboratory studies.  She did not have any further questions.    Discharge coordination care greater than 30 minutes  Electronically signed by: Michael Duane Johnson, CNP

## 2021-08-10 NOTE — PROGRESS NOTES
[facility]      Facility:    [facility] [[id number]]  Code Status: UNKNOWN      Chief Complaint/Reason for Visit:  Chief Complaint   Patient presents with     H & P     Status post right total knee arthroplasty, history of hepatitis B, postoperative anemia, rehospitalization for possible infection and swelling of that knee.       HPI:   Preeti Pineda is a 62 y.o. female who was residing here at the [facility] from previous hospitalization which she had not right total knee arthroplasty.  She this was done secondary DJD was refractory to conservative management.  She then was discharged from the hospital and she was sent here to the TCU she had increased swelling and redness which was a concern for the family.  She then went into the hospital and was worked up very thoroughly no real infection was found.  She was then sent back here to the TCU which her pain is well-managed and she is moving her bowels without difficulty.  She has no new issues and her depression is well controlled.  She was treated probably and transferred here to the TCU in stable condition.    Past Medical History:  Past Medical History:   Diagnosis Date     DJD (degenerative joint disease) of knee      History of hepatitis B      HLD (hyperlipidemia)      Major depression            Surgical History:  Past Surgical History:   Procedure Laterality Date     APPENDECTOMY       BLADDER TUMOR EXCISION       FOOT SURGERY      arch inserted in right foot and cord shortened     HYSTERECTOMY       ROTATOR CUFF REPAIR Left 2007     TOTAL KNEE ARTHROPLASTY Right 07/23/2018     URETHROPLASTY         Family History:   Family History   Problem Relation Age of Onset     Brain cancer Maternal Grandmother      Lung cancer Maternal Grandfather      Throat cancer Maternal Grandfather        Social History:    Social History     Social History     Marital status:      Spouse name: N/A     Number of  children: N/A     Years of education: N/A     Social History Main Topics     Smoking status: Never Smoker     Smokeless tobacco: Never Used     Alcohol use Yes     Drug use: None     Sexual activity: Not Asked     Other Topics Concern     None     Social History Narrative          Review of Systems   Constitutional: Negative for activity change, chills and fever.   HENT: Negative for hearing loss.    Eyes: Negative for visual disturbance.   Respiratory: Negative for apnea, chest tightness and shortness of breath.    Cardiovascular: Negative for chest pain and palpitations.   Gastrointestinal: Negative for abdominal pain, constipation, nausea and vomiting.   Endocrine: Negative for polydipsia, polyphagia and polyuria.   Genitourinary: Negative for decreased urine volume and urgency.   Musculoskeletal: Negative for neck pain and neck stiffness.   Skin: Negative for rash.   Hematological: Does not bruise/bleed easily.   Psychiatric/Behavioral: Negative for agitation and behavioral problems.       Vitals:    07/30/18 1306   BP: 121/73   Pulse: 70   Resp: 14   Temp: 97.1  F (36.2  C)   SpO2: 97%       Physical Exam   Constitutional: She is oriented to person, place, and time. She appears well-developed and well-nourished. No distress.   HENT:   Head: Normocephalic and atraumatic.   Nose: Nose normal.   Mouth/Throat: Oropharynx is clear and moist. No oropharyngeal exudate.   Eyes: Conjunctivae and EOM are normal. Pupils are equal, round, and reactive to light. Right eye exhibits no discharge. Left eye exhibits no discharge. No scleral icterus.   Neck: Neck supple. No tracheal deviation present. No thyromegaly present.   Cardiovascular: Normal rate, regular rhythm and normal heart sounds.    No murmur heard.  Pulmonary/Chest: Effort normal. No respiratory distress. She has no wheezes. She has no rales.   Abdominal: Soft. Bowel sounds are normal. She exhibits no distension. There is no tenderness.   Lymphadenopathy:     She  has no cervical adenopathy.   Neurological: She is alert and oriented to person, place, and time. She displays normal reflexes. No cranial nerve deficit. She exhibits normal muscle tone. Coordination normal.   Skin: Skin is warm and dry. She is not diaphoretic. No erythema.   Psychiatric: She has a normal mood and affect. Her behavior is normal.       Medication List:  Current Outpatient Prescriptions   Medication Sig     acetaminophen (TYLENOL) 325 MG tablet Take 650 mg by mouth every 4 (four) hours as needed for pain.     aspirin 325 MG tablet Take 325 mg by mouth daily.     biotin 1 mg cap Take 1 mg by mouth daily.     calcium carbonate (OS-LATOYA) 250 mg calcium (625 mg) Take 1 tablet by mouth daily.     citalopram (CELEXA) 20 MG tablet Take 10 mg by mouth daily.     oxyCODONE (ROXICODONE) 5 MG immediate release tablet Take 5-10 mg by mouth every 3 (three) hours as needed for pain.     senna-docusate (SENNOSIDES-DOCUSATE SODIUM) 8.6-50 mg tablet Take 1 tablet by mouth 2 (two) times a day.       Labs: Viewed.      Assessment:    ICD-10-CM    1. 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 Pain management R52    3. Postoperative anemia D64.9        Plan: Plan at this time no changes in medications at this time patient is doing well and she is progressing as anticipated with physical ocular patient therapy.  The the knee itself does not look infected at this time is no signs of any dehiscence or discharge or redness.  She does not appear to be anemic at this time and pain is well controlled.  I will continue to monitor above medical problems at this time.        Electronically signed by: Epifanio Dudley DO

## 2021-09-12 ENCOUNTER — HEALTH MAINTENANCE LETTER (OUTPATIENT)
Age: 66
End: 2021-09-12

## 2021-11-07 ENCOUNTER — HEALTH MAINTENANCE LETTER (OUTPATIENT)
Age: 66
End: 2021-11-07

## 2022-01-02 ENCOUNTER — HEALTH MAINTENANCE LETTER (OUTPATIENT)
Age: 67
End: 2022-01-02

## 2022-03-15 NOTE — PATIENT INSTRUCTIONS
Before Your Surgery      Call your surgeon if there is any change in your health. This includes signs of a cold or flu (such as a sore throat, runny nose, cough, rash or fever).    Do not smoke, drink alcohol or take over the counter medicine (unless your surgeon or primary care doctor tells you to) for the 24 hours before and after surgery.    If you take prescribed drugs: Follow your doctor s orders about which medicines to take and which to stop until after surgery.    Eating and drinking prior to surgery: follow the instructions from your surgeon    Take a shower or bath the night before surgery. Use the soap your surgeon gave you to gently clean your skin. If you do not have soap from your surgeon, use your regular soap. Do not shave or scrub the surgery site.  Wear clean pajamas and have clean sheets on your bed.    Chart reviewed, patient seen and evaluated at bedside. No acute overnight events reported.     Encounter found patient to be in room, calm, cooperative, reporting that he is well. Patient reports he has been going to groups and remains motivated to go to rehab so that he may improve his life's circumstances. Patient reported that he has was able to stay sober after rehab for months previously and reports that he hopes to do it again. Patient offers no new complaints at this time, denies si/ hi, auditory or visual hallucinations.

## 2022-08-22 ENCOUNTER — OFFICE VISIT (OUTPATIENT)
Dept: FAMILY MEDICINE | Facility: CLINIC | Age: 67
End: 2022-08-22
Payer: COMMERCIAL

## 2022-08-22 VITALS
RESPIRATION RATE: 16 BRPM | BODY MASS INDEX: 25.54 KG/M2 | WEIGHT: 130.1 LBS | OXYGEN SATURATION: 97 % | HEIGHT: 60 IN | TEMPERATURE: 97.8 F | HEART RATE: 63 BPM | DIASTOLIC BLOOD PRESSURE: 78 MMHG | SYSTOLIC BLOOD PRESSURE: 130 MMHG

## 2022-08-22 DIAGNOSIS — G89.29 CHRONIC LEFT SHOULDER PAIN: ICD-10-CM

## 2022-08-22 DIAGNOSIS — F33.41 RECURRENT MAJOR DEPRESSIVE DISORDER, IN PARTIAL REMISSION (H): ICD-10-CM

## 2022-08-22 DIAGNOSIS — Z01.818 PREOPERATIVE EXAMINATION: Primary | ICD-10-CM

## 2022-08-22 DIAGNOSIS — M25.512 CHRONIC LEFT SHOULDER PAIN: ICD-10-CM

## 2022-08-22 LAB
ANION GAP SERPL CALCULATED.3IONS-SCNC: 5 MMOL/L (ref 3–14)
BASOPHILS # BLD AUTO: 0 10E3/UL (ref 0–0.2)
BASOPHILS NFR BLD AUTO: 0 %
BUN SERPL-MCNC: 17 MG/DL (ref 7–30)
CALCIUM SERPL-MCNC: 9.2 MG/DL (ref 8.5–10.1)
CHLORIDE BLD-SCNC: 105 MMOL/L (ref 94–109)
CO2 SERPL-SCNC: 29 MMOL/L (ref 20–32)
CREAT SERPL-MCNC: 0.68 MG/DL (ref 0.52–1.04)
EOSINOPHIL # BLD AUTO: 0.1 10E3/UL (ref 0–0.7)
EOSINOPHIL NFR BLD AUTO: 2 %
ERYTHROCYTE [DISTWIDTH] IN BLOOD BY AUTOMATED COUNT: 13.9 % (ref 10–15)
GFR SERPL CREATININE-BSD FRML MDRD: >90 ML/MIN/1.73M2
GLUCOSE BLD-MCNC: 98 MG/DL (ref 70–99)
HCT VFR BLD AUTO: 41 % (ref 35–47)
HGB BLD-MCNC: 13.2 G/DL (ref 11.7–15.7)
LYMPHOCYTES # BLD AUTO: 1.8 10E3/UL (ref 0.8–5.3)
LYMPHOCYTES NFR BLD AUTO: 27 %
MCH RBC QN AUTO: 30.9 PG (ref 26.5–33)
MCHC RBC AUTO-ENTMCNC: 32.2 G/DL (ref 31.5–36.5)
MCV RBC AUTO: 96 FL (ref 78–100)
MONOCYTES # BLD AUTO: 0.6 10E3/UL (ref 0–1.3)
MONOCYTES NFR BLD AUTO: 9 %
NEUTROPHILS # BLD AUTO: 4.2 10E3/UL (ref 1.6–8.3)
NEUTROPHILS NFR BLD AUTO: 62 %
PLATELET # BLD AUTO: 295 10E3/UL (ref 150–450)
POTASSIUM BLD-SCNC: 3.9 MMOL/L (ref 3.4–5.3)
RBC # BLD AUTO: 4.27 10E6/UL (ref 3.8–5.2)
SODIUM SERPL-SCNC: 139 MMOL/L (ref 133–144)
WBC # BLD AUTO: 6.8 10E3/UL (ref 4–11)

## 2022-08-22 PROCEDURE — 85025 COMPLETE CBC W/AUTO DIFF WBC: CPT | Performed by: NURSE PRACTITIONER

## 2022-08-22 PROCEDURE — 80048 BASIC METABOLIC PNL TOTAL CA: CPT | Performed by: NURSE PRACTITIONER

## 2022-08-22 PROCEDURE — 99214 OFFICE O/P EST MOD 30 MIN: CPT | Performed by: NURSE PRACTITIONER

## 2022-08-22 PROCEDURE — 36415 COLL VENOUS BLD VENIPUNCTURE: CPT | Performed by: NURSE PRACTITIONER

## 2022-08-22 RX ORDER — ESCITALOPRAM OXALATE 20 MG/1
20 TABLET ORAL DAILY
Qty: 90 TABLET | Refills: 3 | Status: SHIPPED | OUTPATIENT
Start: 2022-08-22 | End: 2023-08-24

## 2022-08-22 ASSESSMENT — PATIENT HEALTH QUESTIONNAIRE - PHQ9
SUM OF ALL RESPONSES TO PHQ QUESTIONS 1-9: 2
SUM OF ALL RESPONSES TO PHQ QUESTIONS 1-9: 2
10. IF YOU CHECKED OFF ANY PROBLEMS, HOW DIFFICULT HAVE THESE PROBLEMS MADE IT FOR YOU TO DO YOUR WORK, TAKE CARE OF THINGS AT HOME, OR GET ALONG WITH OTHER PEOPLE: SOMEWHAT DIFFICULT

## 2022-08-22 ASSESSMENT — PAIN SCALES - GENERAL: PAINLEVEL: MILD PAIN (2)

## 2022-08-22 NOTE — PROGRESS NOTES
Answers for HPI/ROS submitted by the patient on 8/22/2022  If you checked off any problems, how difficult have these problems made it for you to do your work, take care of things at home, or get along with other people?: Somewhat difficult  PHQ9 TOTAL SCORE: 2    [facility]  [address]  [facility] [address]  Phone: [phone]  Primary Provider: Ilana - DEEPALI Gomez [facility]  Pre-op Performing Provider: ALEXI DE LA GARZA  [id number]}  PREOPERATIVE EVALUATION:  Today's date: 8/22/2022    Preeti Sherman is a 66 year old female who presents for a preoperative evaluation.    Surgical Information:  Surgery/Procedure: left reverse total shoulder arthroplasty   Surgery Location: [facility]   Surgeon: DR Lia   Surgery Date: 8/31/22  Time of Surgery: TBD   Where patient plans to recover: At home with family  Fax number for surgical facility: [phone]     Type of Anesthesia Anticipated: to be determined    Assessment & Plan     The proposed surgical procedure is considered INTERMEDIATE risk.    Preoperative examination    - Basic metabolic panel  (Ca, Cl, CO2, Creat, Gluc, K, Na, BUN); Future  - CBC with platelets and differential; Future  - Basic metabolic panel  (Ca, Cl, CO2, Creat, Gluc, K, Na, BUN)  - CBC with platelets and differential    Chronic left shoulder pain  -patient is cleared for left shoulder arthroplasty   - Basic metabolic panel  (Ca, Cl, CO2, Creat, Gluc, K, Na, BUN); Future  - CBC with platelets and differential; Future  - Basic metabolic panel  (Ca, Cl, CO2, Creat, Gluc, K, Na, BUN)  - CBC with platelets and differential    Recurrent major depressive disorder, in partial remission (H)  -well controlled, refill provided   - escitalopram (LEXAPRO) 20 MG tablet; Take 1 tablet (20 mg) by mouth daily      Risks and Recommendations:  The patient has the following additional risks and recommendations for perioperative complications:   - No identified  additional risk factors other than previously addressed    Medication Instructions:  Patient is to take all scheduled medications on the day of surgery    RECOMMENDATION:  APPROVAL GIVEN to proceed with proposed procedure, without further diagnostic evaluation.    56}    Subjective     HPI related to upcoming procedure: chronic left shoulder pain    Preop Questions 8/22/2022   1. Have you ever had a heart attack or stroke? No   2. Have you ever had surgery on your heart or blood vessels, such as a stent placement, a coronary artery bypass, or surgery on an artery in your head, neck, heart, or legs? No   3. Do you have chest pain with activity? No   4. Do you have a history of  heart failure? No   5. Do you currently have a cold, bronchitis or symptoms of other infection? No   6. Do you have a cough, shortness of breath, or wheezing? No   7. Do you or anyone in your family have previous history of blood clots? No   8. Do you or does anyone in your family have a serious bleeding problem such as prolonged bleeding following surgeries or cuts? No   9. Have you ever had problems with anemia or been told to take iron pills? YES    10. Have you had any abnormal blood loss such as black, tarry or bloody stools, or abnormal vaginal bleeding? No   11. Have you ever had a blood transfusion? No   12. Are you willing to have a blood transfusion if it is medically needed before, during, or after your surgery? Yes   13. Have you or any of your relatives ever had problems with anesthesia? UNKNOWN    14. Do you have sleep apnea, excessive snoring or daytime drowsiness? No   15. Do you have any artifical heart valves or other implanted medical devices like a pacemaker, defibrillator, or continuous glucose monitor? No   16. Do you have artificial joints? YES    17. Are you allergic to latex? No         Preoperative Review of :   reviewed - no record of controlled substances prescribed.      Status of Chronic Conditions:  See  problem list for active medical problems.  Problems all longstanding and stable, except as noted/documented.  See ROS for pertinent symptoms related to these conditions.      Review of Systems  Constitutional, neuro, ENT, endocrine, pulmonary, cardiac, gastrointestinal, genitourinary, musculoskeletal, integument and psychiatric systems are negative, except as otherwise noted.    Patient Active Problem List    Diagnosis Date Noted     Postoperative fever 07/27/2018     Priority: Medium     S/P total knee arthroplasty, right 07/24/2018     Priority: Medium     Right knee DJD 07/23/2018     Priority: Medium     History of hepatitis B 07/20/2015     Priority: Medium     Hyperlipidemia LDL goal <130 03/19/2012     Priority: Medium     Major depression in partial remission (H) 06/30/2011     Priority: Medium      quadraplegic from accident now for over 10 years. Does well with lexapro.       CARDIOVASCULAR SCREENING; LDL GOAL LESS THAN 160 06/30/2011     Priority: Medium     Disorder of bursae and tendons in shoulder region 07/03/2007     Priority: Medium     Problem list name updated by automated process. 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     FOOT SURGERY      arch inserted in right foot and cord shortened     HYSTERECTOMY  1988     HYSTERECTOMY       shoulder  2007    Left rotator cuff repair.     TOTAL KNEE ARTHROPLASTY Right 07/23/2018     URETHROPLASTY       URETHROPLASTY       Current Outpatient  Medications   Medication Sig Dispense Refill     Biotin 1 MG CAPS Take 1 capsule by mouth daily        Calcium 250 MG CAPS Take 1 capsule by mouth daily        escitalopram (LEXAPRO) 20 MG tablet Take 1 tablet (20 mg) by mouth daily 90 tablet 3     TURMERIC CURCUMIN PO Take 1 capsule by mouth daily         Allergies   Allergen Reactions     Pcn [Penicillins] Hives     Penicillins Itching     Patient notes she can take keflex  Astrid Pankratz, PA-C      Sulfa Drugs Itching     Sulfa Drugs Hives        Social History     Tobacco Use     Smoking status: Never Smoker     Smokeless tobacco: Never Used   Substance Use Topics     Alcohol use: Never     Comment: Socially; 0-1 drinks per week       History   Drug Use No         Objective     /78 (BP Location: Left arm, Patient Position: Sitting, Cuff Size: Adult Regular)   Pulse 63   Temp 97.8  F (36.6  C) (Tympanic)   Resp 16   Ht 1.524 m (5')   Wt 59 kg (130 lb 1.6 oz)   SpO2 97%   BMI 25.41 kg/m      Physical Exam    GENERAL APPEARANCE: healthy, alert and no distress     EYES: EOMI, PERRL     HENT: ear canals and TM's normal and nose and mouth without ulcers or lesions     NECK: no adenopathy, no asymmetry, masses, or scars and thyroid normal to palpation     RESP: lungs clear to auscultation - no rales, rhonchi or wheezes     CV: regular rates and rhythm, normal S1 S2, no S3 or S4 and no murmur, click or rub     MS: extremities normal- no gross deformities noted, no evidence of inflammation in joints, FROM in all extremities.     SKIN: no suspicious lesions or rashes     NEURO: Normal strength and tone, sensory exam grossly normal, mentation intact and speech normal     PSYCH: mentation appears normal. and affect normal/bright     LYMPHATICS: No cervical adenopathy    No results for input(s): HGB, PLT, INR, NA, POTASSIUM, CR, A1C in the last 05332 hours.     Diagnostics:  Recent Results (from the past 24 hour(s))   Basic metabolic panel  (Ca, Cl, CO2,  Creat, Gluc, K, Na, BUN)    Collection Time: 08/22/22 10:22 AM   Result Value Ref Range    Sodium 139 133 - 144 mmol/L    Potassium 3.9 3.4 - 5.3 mmol/L    Chloride 105 94 - 109 mmol/L    Carbon Dioxide (CO2) 29 20 - 32 mmol/L    Anion Gap 5 3 - 14 mmol/L    Urea Nitrogen 17 7 - 30 mg/dL    Creatinine 0.68 0.52 - 1.04 mg/dL    Calcium 9.2 8.5 - 10.1 mg/dL    Glucose 98 70 - 99 mg/dL    GFR Estimate >90 >60 mL/min/1.73m2   CBC with platelets and differential    Collection Time: 08/22/22 10:22 AM   Result Value Ref Range    WBC Count 6.8 4.0 - 11.0 10e3/uL    RBC Count 4.27 3.80 - 5.20 10e6/uL    Hemoglobin 13.2 11.7 - 15.7 g/dL    Hematocrit 41.0 35.0 - 47.0 %    MCV 96 78 - 100 fL    MCH 30.9 26.5 - 33.0 pg    MCHC 32.2 31.5 - 36.5 g/dL    RDW 13.9 10.0 - 15.0 %    Platelet Count 295 150 - 450 10e3/uL    % Neutrophils 62 %    % Lymphocytes 27 %    % Monocytes 9 %    % Eosinophils 2 %    % Basophils 0 %    Absolute Neutrophils 4.2 1.6 - 8.3 10e3/uL    Absolute Lymphocytes 1.8 0.8 - 5.3 10e3/uL    Absolute Monocytes 0.6 0.0 - 1.3 10e3/uL    Absolute Eosinophils 0.1 0.0 - 0.7 10e3/uL    Absolute Basophils 0.0 0.0 - 0.2 10e3/uL      No EKG required, no history of coronary heart disease, significant arrhythmia, peripheral arterial disease or other structural heart disease.    Revised Cardiac Risk Index (RCRI):  The patient has the following serious cardiovascular risks for perioperative complications:   - No serious cardiac risks = 0 points     RCRI Interpretation: 0 points: Class I (very low risk - 0.4% complication rate)           Signed Electronically by: FUENTES Andersen CNP  Copy of this evaluation report is provided to requesting physician.

## 2022-08-28 ENCOUNTER — LAB (OUTPATIENT)
Dept: FAMILY MEDICINE | Facility: CLINIC | Age: 67
End: 2022-08-28
Payer: COMMERCIAL

## 2022-08-28 DIAGNOSIS — Z20.822 ENCOUNTER FOR LABORATORY TESTING FOR COVID-19 VIRUS: ICD-10-CM

## 2022-08-28 PROCEDURE — 99207 PR NO CHARGE LOS: CPT

## 2022-08-28 PROCEDURE — U0003 INFECTIOUS AGENT DETECTION BY NUCLEIC ACID (DNA OR RNA); SEVERE ACUTE RESPIRATORY SYNDROME CORONAVIRUS 2 (SARS-COV-2) (CORONAVIRUS DISEASE [COVID-19]), AMPLIFIED PROBE TECHNIQUE, MAKING USE OF HIGH THROUGHPUT TECHNOLOGIES AS DESCRIBED BY CMS-2020-01-R: HCPCS

## 2022-08-28 PROCEDURE — U0005 INFEC AGEN DETEC AMPLI PROBE: HCPCS

## 2022-08-29 LAB — SARS-COV-2 RNA RESP QL NAA+PROBE: NEGATIVE

## 2022-11-19 ENCOUNTER — HEALTH MAINTENANCE LETTER (OUTPATIENT)
Age: 67
End: 2022-11-19

## 2023-01-05 ENCOUNTER — TELEPHONE (OUTPATIENT)
Dept: FAMILY MEDICINE | Facility: CLINIC | Age: 68
End: 2023-01-05

## 2023-01-05 NOTE — TELEPHONE ENCOUNTER
Patient Quality Outreach    Patient is due for the following:   Breast Cancer Screening - Mammogram    Next Steps:   needs to schedule mammogram     Type of outreach:    Sent letter.    Next Steps:  Reach out within 90 days via Letter.    Max number of attempts reached: No. Will try again in 90 days if patient still on fail list.    Questions for provider review:    None     Shiloh Elizalde CMA  Chart routed to none.

## 2023-01-05 NOTE — LETTER
January 5, 2023    To  Preeti Sherman  [address] [place] [address]    Your team at [facility] cares about your health. We have reviewed your chart and based on our findings; we are making the following recommendations to better manage your health.     You are in particular need of attention regarding the following:     Schedule Annual MAMMOGRAPHY. [facility] scheduling number is [phone] or schedule in [facility] (self referral).    If you have already completed these items, please contact the clinic via phone or   [facility] so your care team can review and update your records. Thank you for   choosing [facility] for your healthcare needs. For any questions,   concerns, or to schedule an appointment please contact our clinic.    Healthy Regards,      Your [facility] Care Team

## 2023-04-09 ENCOUNTER — HEALTH MAINTENANCE LETTER (OUTPATIENT)
Age: 68
End: 2023-04-09

## 2023-08-24 DIAGNOSIS — F33.41 RECURRENT MAJOR DEPRESSIVE DISORDER, IN PARTIAL REMISSION (H): ICD-10-CM

## 2023-08-24 RX ORDER — ESCITALOPRAM OXALATE 20 MG/1
20 TABLET ORAL DAILY
Qty: 90 TABLET | Refills: 0 | Status: SHIPPED | OUTPATIENT
Start: 2023-08-24 | End: 2024-08-05

## 2023-08-24 NOTE — TELEPHONE ENCOUNTER
Routing refill request to provider for review/approval because:  Patient needs to be seen because it has been more than 1 year since last office visit.  PHQ9     Thank you    Chio CARTAGENA RN

## 2023-11-19 ENCOUNTER — HEALTH MAINTENANCE LETTER (OUTPATIENT)
Age: 68
End: 2023-11-19

## 2024-01-04 ENCOUNTER — APPOINTMENT (RX ONLY)
Dept: URBAN - METROPOLITAN AREA TELEMEDICINE 2 | Facility: TELEMEDICINE | Age: 69
Setting detail: DERMATOLOGY
End: 2024-01-04

## 2024-01-04 DIAGNOSIS — L57.0 ACTINIC KERATOSIS: ICD-10-CM

## 2024-01-04 DIAGNOSIS — L81.4 OTHER MELANIN HYPERPIGMENTATION: ICD-10-CM

## 2024-01-04 DIAGNOSIS — L82.0 INFLAMED SEBORRHEIC KERATOSIS: ICD-10-CM

## 2024-01-04 DIAGNOSIS — D18.0 HEMANGIOMA: ICD-10-CM

## 2024-01-04 DIAGNOSIS — D22 MELANOCYTIC NEVI: ICD-10-CM

## 2024-01-04 DIAGNOSIS — Z71.89 OTHER SPECIFIED COUNSELING: ICD-10-CM

## 2024-01-04 DIAGNOSIS — L82.1 OTHER SEBORRHEIC KERATOSIS: ICD-10-CM

## 2024-01-04 DIAGNOSIS — L81.8 OTHER SPECIFIED DISORDERS OF PIGMENTATION: ICD-10-CM

## 2024-01-04 PROBLEM — D18.01 HEMANGIOMA OF SKIN AND SUBCUTANEOUS TISSUE: Status: ACTIVE | Noted: 2024-01-04

## 2024-01-04 PROBLEM — D48.5 NEOPLASM OF UNCERTAIN BEHAVIOR OF SKIN: Status: ACTIVE | Noted: 2024-01-04

## 2024-01-04 PROBLEM — D22.5 MELANOCYTIC NEVI OF TRUNK: Status: ACTIVE | Noted: 2024-01-04

## 2024-01-04 PROBLEM — D22.62 MELANOCYTIC NEVI OF LEFT UPPER LIMB, INCLUDING SHOULDER: Status: ACTIVE | Noted: 2024-01-04

## 2024-01-04 PROCEDURE — 99203 OFFICE O/P NEW LOW 30 MIN: CPT | Mod: 25

## 2024-01-04 PROCEDURE — ? COUNSELING

## 2024-01-04 PROCEDURE — 17000 DESTRUCT PREMALG LESION: CPT | Mod: 59

## 2024-01-04 PROCEDURE — ? BIOPSY BY SHAVE METHOD

## 2024-01-04 PROCEDURE — ? PRESCRIPTION

## 2024-01-04 PROCEDURE — 11102 TANGNTL BX SKIN SINGLE LES: CPT | Mod: 59

## 2024-01-04 PROCEDURE — 17110 DESTRUCTION B9 LES UP TO 14: CPT

## 2024-01-04 PROCEDURE — 17003 DESTRUCT PREMALG LES 2-14: CPT | Mod: 59

## 2024-01-04 PROCEDURE — ? PHOTO-DOCUMENTATION

## 2024-01-04 PROCEDURE — ? LIQUID NITROGEN

## 2024-01-04 RX ORDER — MUPIROCIN 20 MG/G
THIN LAYER OINTMENT TOPICAL BID
Qty: 22 | Refills: 0 | Status: ERX | COMMUNITY
Start: 2024-01-04

## 2024-01-04 RX ADMIN — MUPIROCIN THIN LAYER: 20 OINTMENT TOPICAL at 00:00

## 2024-01-04 ASSESSMENT — LOCATION DETAILED DESCRIPTION DERM
LOCATION DETAILED: RIGHT SUPERIOR PREAURICULAR CHEEK
LOCATION DETAILED: LEFT INFERIOR UPPER BACK
LOCATION DETAILED: RIGHT DISTAL POSTERIOR THIGH
LOCATION DETAILED: RIGHT SUPERIOR MEDIAL MIDBACK
LOCATION DETAILED: LEFT INFERIOR LATERAL FOREHEAD
LOCATION DETAILED: LEFT INFERIOR LATERAL UPPER BACK
LOCATION DETAILED: RIGHT MEDIAL TEMPLE
LOCATION DETAILED: RIGHT INFERIOR FOREHEAD
LOCATION DETAILED: LEFT RIB CAGE
LOCATION DETAILED: LEFT DISTAL POSTERIOR THIGH
LOCATION DETAILED: LEFT MEDIAL SUPERIOR CHEST
LOCATION DETAILED: INFERIOR THORACIC SPINE
LOCATION DETAILED: RIGHT CENTRAL TEMPLE
LOCATION DETAILED: RIGHT INFERIOR LATERAL FOREHEAD
LOCATION DETAILED: LEFT AXILLARY VAULT
LOCATION DETAILED: LEFT DISTAL POSTERIOR UPPER ARM
LOCATION DETAILED: RIGHT PROXIMAL DORSAL FOREARM
LOCATION DETAILED: SUPERIOR THORACIC SPINE
LOCATION DETAILED: RIGHT FOREHEAD
LOCATION DETAILED: LEFT LATERAL EYEBROW
LOCATION DETAILED: SUBXIPHOID
LOCATION DETAILED: LEFT PROXIMAL DORSAL FOREARM
LOCATION DETAILED: RIGHT DISTAL POSTERIOR UPPER ARM

## 2024-01-04 ASSESSMENT — LOCATION SIMPLE DESCRIPTION DERM
LOCATION SIMPLE: RIGHT FOREHEAD
LOCATION SIMPLE: RIGHT CHEEK
LOCATION SIMPLE: LEFT UPPER BACK
LOCATION SIMPLE: RIGHT POSTERIOR UPPER ARM
LOCATION SIMPLE: CHEST
LOCATION SIMPLE: LEFT FOREHEAD
LOCATION SIMPLE: LEFT AXILLARY VAULT
LOCATION SIMPLE: LEFT POSTERIOR THIGH
LOCATION SIMPLE: LEFT EYEBROW
LOCATION SIMPLE: RIGHT POSTERIOR THIGH
LOCATION SIMPLE: LEFT FOREARM
LOCATION SIMPLE: RIGHT FOREARM
LOCATION SIMPLE: ABDOMEN
LOCATION SIMPLE: UPPER BACK
LOCATION SIMPLE: LEFT POSTERIOR UPPER ARM
LOCATION SIMPLE: RIGHT TEMPLE
LOCATION SIMPLE: RIGHT LOWER BACK

## 2024-01-04 ASSESSMENT — LOCATION ZONE DERM
LOCATION ZONE: FACE
LOCATION ZONE: TRUNK
LOCATION ZONE: LEG
LOCATION ZONE: ARM
LOCATION ZONE: AXILLAE

## 2024-01-04 NOTE — PROCEDURE: LIQUID NITROGEN
Consent: The patient's consent was obtained including but not limited to risks of crusting, scabbing, blistering, scarring, darker or lighter pigmentary change, recurrence, incomplete removal and infection.
Show Aperture Variable?: Yes
Duration Of Freeze Thaw-Cycle (Seconds): 0
Render Note In Bullet Format When Appropriate: No
Post-Care Instructions: I reviewed with the patient in detail post-care instructions. Patient is to wear sunprotection, and avoid picking at any of the treated lesions. Pt may apply Vaseline to crusted or scabbing areas.
Detail Level: Detailed
Medical Necessity Information: It is in your best interest to select a reason for this procedure from the list below. All of these items fulfill various CMS LCD requirements except the new and changing color options.
Medical Necessity Clause: This procedure was medically necessary because the lesions that were treated were:
Spray Paint Text: The liquid nitrogen was applied to the skin utilizing a spray paint frosting technique.

## 2024-01-04 NOTE — PROCEDURE: BIOPSY BY SHAVE METHOD
Body Location Override (Optional - Billing Will Still Be Based On Selected Body Map Location If Applicable): left posterior thigh
Detail Level: Detailed
Depth Of Biopsy: dermis
Was A Bandage Applied: Yes
Size Of Lesion In Cm: 0
Biopsy Type: H and E
Biopsy Method: Dermablade
Anesthesia Type: 1% lidocaine with epinephrine
Anesthesia Volume In Cc: 0.5
Hemostasis: Drysol
Wound Care: Petrolatum
Dressing: bandage
Destruction After The Procedure: No
Type Of Destruction Used: Curettage
Curettage Text: The wound bed was treated with curettage after the biopsy was performed.
Cryotherapy Text: The wound bed was treated with cryotherapy after the biopsy was performed.
Electrodesiccation Text: The wound bed was treated with electrodesiccation after the biopsy was performed.
Electrodesiccation And Curettage Text: The wound bed was treated with electrodesiccation and curettage after the biopsy was performed.
Silver Nitrate Text: The wound bed was treated with silver nitrate after the biopsy was performed.
Lab: [id number]
Lab Facility: [id number]
Consent: Written consent was obtained and risks were reviewed including but not limited to scarring, infection, bleeding, scabbing, incomplete removal, nerve damage and allergy to anesthesia.
Post-Care Instructions: I reviewed with the patient in detail post-care instructions. Patient is to keep the biopsy site dry overnight, and then apply bacitracin twice daily until healed. Patient may apply hydrogen peroxide soaks to remove any crusting.
Notification Instructions: Patient will be notified of biopsy results. However, patient instructed to call the office if not contacted within 2 weeks.
Billing Type: Third-Party Bill
Information: Selecting Yes will display possible errors in your note based on the variables you have selected. This validation is only offered as a suggestion for you. PLEASE NOTE THAT THE VALIDATION TEXT WILL BE REMOVED WHEN YOU FINALIZE YOUR NOTE. IF YOU WANT TO FAX A PRELIMINARY NOTE YOU WILL NEED TO TOGGLE THIS TO 'NO' IF YOU DO NOT WANT IT IN YOUR FAXED NOTE.

## 2024-04-25 NOTE — PROGRESS NOTES
MELISSA MARTINEZ DISCHARGE NOTE    Patient discharged to transitional care unit at 4:50 PM via cart. Accompanied by other: staff. Discharge instructions reviewed with patient, opportunity offered to ask questions. Prescriptions sent to patients preferred pharmacy. All belongings sent with patient.    Joyce Pittman   98.3

## 2024-06-16 ENCOUNTER — HEALTH MAINTENANCE LETTER (OUTPATIENT)
Age: 69
End: 2024-06-16

## 2024-08-05 ENCOUNTER — OFFICE VISIT (OUTPATIENT)
Dept: FAMILY MEDICINE | Facility: CLINIC | Age: 69
End: 2024-08-05
Payer: COMMERCIAL

## 2024-08-05 VITALS
WEIGHT: 120.5 LBS | SYSTOLIC BLOOD PRESSURE: 122 MMHG | OXYGEN SATURATION: 98 % | TEMPERATURE: 98.4 F | BODY MASS INDEX: 23.66 KG/M2 | RESPIRATION RATE: 16 BRPM | HEART RATE: 64 BPM | DIASTOLIC BLOOD PRESSURE: 82 MMHG | HEIGHT: 60 IN

## 2024-08-05 DIAGNOSIS — Z12.31 ENCOUNTER FOR SCREENING MAMMOGRAM FOR BREAST CANCER: ICD-10-CM

## 2024-08-05 DIAGNOSIS — F43.21 SITUATIONAL DEPRESSION: ICD-10-CM

## 2024-08-05 DIAGNOSIS — R30.0 DYSURIA: Primary | ICD-10-CM

## 2024-08-05 PROBLEM — M17.11 RIGHT KNEE DJD: Status: RESOLVED | Noted: 2018-07-23 | Resolved: 2024-08-05

## 2024-08-05 LAB
ALBUMIN UR-MCNC: NEGATIVE MG/DL
APPEARANCE UR: CLEAR
BACTERIA #/AREA URNS HPF: ABNORMAL /HPF
BILIRUB UR QL STRIP: NEGATIVE
COLOR UR AUTO: YELLOW
GLUCOSE UR STRIP-MCNC: NEGATIVE MG/DL
HGB UR QL STRIP: NEGATIVE
KETONES UR STRIP-MCNC: NEGATIVE MG/DL
LEUKOCYTE ESTERASE UR QL STRIP: ABNORMAL
NITRATE UR QL: NEGATIVE
PH UR STRIP: 6.5 [PH] (ref 5–8)
RBC #/AREA URNS AUTO: ABNORMAL /HPF
SP GR UR STRIP: 1.01 (ref 1–1.03)
SQUAMOUS #/AREA URNS AUTO: ABNORMAL /LPF
UROBILINOGEN UR STRIP-ACNC: 0.2 E.U./DL
WBC #/AREA URNS AUTO: ABNORMAL /HPF

## 2024-08-05 PROCEDURE — 99214 OFFICE O/P EST MOD 30 MIN: CPT | Performed by: FAMILY MEDICINE

## 2024-08-05 PROCEDURE — 81001 URINALYSIS AUTO W/SCOPE: CPT | Performed by: FAMILY MEDICINE

## 2024-08-05 PROCEDURE — 87186 SC STD MICRODIL/AGAR DIL: CPT | Performed by: FAMILY MEDICINE

## 2024-08-05 PROCEDURE — 87086 URINE CULTURE/COLONY COUNT: CPT | Performed by: FAMILY MEDICINE

## 2024-08-05 RX ORDER — NITROFURANTOIN 25; 75 MG/1; MG/1
100 CAPSULE ORAL 2 TIMES DAILY
Qty: 10 CAPSULE | Refills: 0 | Status: SHIPPED | OUTPATIENT
Start: 2024-08-05 | End: 2024-08-10

## 2024-08-05 RX ORDER — ESCITALOPRAM OXALATE 10 MG/1
20 TABLET ORAL DAILY
Qty: 90 TABLET | Refills: 1 | Status: SHIPPED | OUTPATIENT
Start: 2024-08-05

## 2024-08-05 ASSESSMENT — PATIENT HEALTH QUESTIONNAIRE - PHQ9
SUM OF ALL RESPONSES TO PHQ QUESTIONS 1-9: 3
10. IF YOU CHECKED OFF ANY PROBLEMS, HOW DIFFICULT HAVE THESE PROBLEMS MADE IT FOR YOU TO DO YOUR WORK, TAKE CARE OF THINGS AT HOME, OR GET ALONG WITH OTHER PEOPLE: NOT DIFFICULT AT ALL
SUM OF ALL RESPONSES TO PHQ QUESTIONS 1-9: 3

## 2024-08-05 ASSESSMENT — ANXIETY QUESTIONNAIRES
5. BEING SO RESTLESS THAT IT IS HARD TO SIT STILL: NOT AT ALL
IF YOU CHECKED OFF ANY PROBLEMS ON THIS QUESTIONNAIRE, HOW DIFFICULT HAVE THESE PROBLEMS MADE IT FOR YOU TO DO YOUR WORK, TAKE CARE OF THINGS AT HOME, OR GET ALONG WITH OTHER PEOPLE: NOT DIFFICULT AT ALL
4. TROUBLE RELAXING: SEVERAL DAYS
GAD7 TOTAL SCORE: 4
GAD7 TOTAL SCORE: 4
7. FEELING AFRAID AS IF SOMETHING AWFUL MIGHT HAPPEN: NOT AT ALL
6. BECOMING EASILY ANNOYED OR IRRITABLE: SEVERAL DAYS
7. FEELING AFRAID AS IF SOMETHING AWFUL MIGHT HAPPEN: NOT AT ALL
3. WORRYING TOO MUCH ABOUT DIFFERENT THINGS: SEVERAL DAYS
GAD7 TOTAL SCORE: 4
8. IF YOU CHECKED OFF ANY PROBLEMS, HOW DIFFICULT HAVE THESE MADE IT FOR YOU TO DO YOUR WORK, TAKE CARE OF THINGS AT HOME, OR GET ALONG WITH OTHER PEOPLE?: NOT DIFFICULT AT ALL
2. NOT BEING ABLE TO STOP OR CONTROL WORRYING: NOT AT ALL
1. FEELING NERVOUS, ANXIOUS, OR ON EDGE: SEVERAL DAYS

## 2024-08-05 NOTE — ASSESSMENT & PLAN NOTE
She has been taking Lexapro during the summer months during her annual 3-month visit to [place].  Without it she finds herself to be tearful in the context of interacting with her quadriplegic .  No side effects.  We discussed that she may realize similar benefit from a lower dose of the medication.  10 mg escitalopram sent to pharmacy as a trial of a lower dose.  I encouraged her to fully establish care with a provider in [place] who could then manage her medicines.  She has not had recent mammography.  Mammogram order placed as well to be completed while she is in [place].

## 2024-08-05 NOTE — PROGRESS NOTES
"  Assessment & Plan     Dysuria: History of urinary tract infections.  UA with some markers of infection.  Will start therapy pending culture.  Macrobid previously tolerated.  No history of drug resistance.  - UA Macroscopic with reflex to Microscopic and Culture - Lab Collect; Future  - UA Macroscopic with reflex to Microscopic and Culture - Lab Collect  - UA Microscopic with Reflex to Culture  - Urine Culture Aerobic Bacterial - lab collect; Future  - nitroFURantoin macrocrystal-monohydrate (MACROBID) 100 MG capsule; Take 1 capsule (100 mg) by mouth 2 times daily for 5 days    Situational depression  She has been taking Lexapro during the summer months during her annual 3-month visit to [place].  Without it she finds herself to be tearful in the context of interacting with her quadriplegic .  No side effects.  We discussed that she may realize similar benefit from a lower dose of the medication.  10 mg escitalopram sent to pharmacy as a trial of a lower dose.  I encouraged her to fully establish care with a provider in [place] who could then manage her medicines.  She has not had recent mammography.  Mammogram order placed as well to be completed while she is in [place].      Shanell Kaplan is a 68 year old, presenting for the following health issues:  UTI (Frequency urination irritation since Saturday)        8/5/2024    12:54 PM   Additional Questions   Roomed by xl     Lower abdominal pain:   - 2-3 days of painful urination   - lower pelvic pain   - no fever.  No chills.   - history of renal failure (3-4 years ago).  Full recovery.   - most recent UTI was ~6 months ago.      Mental health:    - she has been on and off lexapro.  History of depression.  She struggles more while in [place] than in [place] (where she spends most of her year).  \"Situational.\"  Here in the summer.      History of Present Illness       CKD: She is not using over the counter pain medicine.     Mental Health Follow-up:  Patient " presents to follow-up on Anxiety.    Patient's anxiety since last visit has been:  Better  The patient is not having other symptoms associated with anxiety.  Any significant life events: other  Patient is feeling anxious or having panic attacks.  Patient has no concerns about alcohol or drug use.    She eats 0-1 servings of fruits and vegetables daily.She consumes 0 sweetened beverage(s) daily.She exercises with enough effort to increase her heart rate 9 or less minutes per day.  She exercises with enough effort to increase her heart rate 3 or less days per week.   She is taking medications regularly.        8/22/2022     9:41 AM 8/5/2024    12:43 PM   PHQ   PHQ-9 Total Score 2 3   Q9: Thoughts of better off dead/self-harm past 2 weeks Not at all Not at all         9/13/2011    11:14 AM 7/7/2015     6:03 PM 8/5/2024    12:46 PM   THUY-7 SCORE   Total Score 10 7    Total Score   4 (minimal anxiety)   Total Score   4               Objective    /82   Pulse 64   Temp 98.4  F (36.9  C) (Oral)   Resp 16   Ht 1.524 m (5')   Wt 54.7 kg (120 lb 8 oz)   SpO2 98%   BMI 23.53 kg/m    Body mass index is 23.53 kg/m .  Physical Exam  Nursing note reviewed.   Constitutional:       General: She is not in acute distress.     Appearance: Normal appearance. She is not ill-appearing.   HENT:      Head: Normocephalic and atraumatic.      Right Ear: External ear normal.      Left Ear: External ear normal.   Eyes:      General: No scleral icterus.     Extraocular Movements: Extraocular movements intact.      Conjunctiva/sclera: Conjunctivae normal.   Cardiovascular:      Rate and Rhythm: Normal rate and regular rhythm.      Heart sounds: Normal heart sounds. No murmur heard.     No friction rub. No gallop.   Pulmonary:      Effort: Pulmonary effort is normal. No respiratory distress.      Breath sounds: Normal breath sounds. No wheezing or rales.   Abdominal:      Palpations: Abdomen is soft.      Tenderness: There is abdominal  tenderness.      Comments: Left suprapubic tenderness.    Musculoskeletal:         General: No swelling. Normal range of motion.   Skin:     General: Skin is warm.      Coloration: Skin is not jaundiced.      Findings: No rash.   Neurological:      General: No focal deficit present.      Mental Status: She is alert and oriented to person, place, and time. Mental status is at baseline.   Psychiatric:         Attention and Perception: Attention normal.         Mood and Affect: Mood normal.         Speech: Speech normal.         Thought Content: Thought content normal.          Lab on 08/28/2022   Component Date Value Ref Range Status    SARS CoV2 PCR 08/28/2022 Negative  Negative Final    NEGATIVE: SARS-CoV-2 (COVID-19) RNA not detected, presumed negative.           Signed Electronically by: Chris Shahid MD

## 2024-08-06 LAB — BACTERIA UR CULT: ABNORMAL

## 2024-12-18 ENCOUNTER — PATIENT OUTREACH (OUTPATIENT)
Dept: CARE COORDINATION | Facility: CLINIC | Age: 69
End: 2024-12-18
Payer: COMMERCIAL

## 2025-06-21 ENCOUNTER — HEALTH MAINTENANCE LETTER (OUTPATIENT)
Age: 70
End: 2025-06-21

## 2025-07-31 ENCOUNTER — TELEPHONE (OUTPATIENT)
Dept: FAMILY MEDICINE | Facility: CLINIC | Age: 70
End: 2025-07-31
Payer: COMMERCIAL

## (undated) DEVICE — GOWN XLG DISP 9545

## (undated) DEVICE — BONE CEMENT KIT BOWL AND SPATULA STRK 6201-3-410

## (undated) DEVICE — KIT DRAIN CLOSED WOUND SUCTION MED 400ML RESVR

## (undated) DEVICE — SU STRATAFIX 3-0 MH 12" PS-2 SXMD1B103

## (undated) DEVICE — SU PDO 1 STRATAFIX 36X36CM CTX TAPERPOINT SXPD2B405

## (undated) DEVICE — SUCTION IRR SYSTEM W/TIP INTERPULSE

## (undated) DEVICE — PREP CHLORAPREP 26ML TINTED ORANGE  260815

## (undated) DEVICE — GLOVE PROTEXIS W/NEU-THERA 8.5  2D73TE85

## (undated) DEVICE — GLOVE PROTEXIS BLUE W/NEU-THERA 8.5  2D73EB85

## (undated) DEVICE — BLADE SAW SAGITTAL STRK 21X90X1.19MM HD SYS 6 6221-119-090

## (undated) DEVICE — GOWN LG DISP 9515

## (undated) DEVICE — Device

## (undated) DEVICE — BNDG COBAN 6"X5YDS STERILE

## (undated) DEVICE — SOL WATER IRRIG 1000ML BOTTLE 07139-09

## (undated) DEVICE — GLOVE PROTEXIS W/NEU-THERA 7.5  2D73TE75

## (undated) DEVICE — STOCKING SLEEVE COMPRESSION CALF LG

## (undated) DEVICE — CAST PADDING 6" STERILE 9046S

## (undated) DEVICE — SU PDO 3-0 STRATAFIX 24CM FS/FS REV CUT SXPD2B419

## (undated) DEVICE — BONE CLEANING TIP INTERPULSE  0210-010-000

## (undated) DEVICE — SOL NACL 0.9% IRRIG 1000ML BOTTLE 07138-09

## (undated) DEVICE — GLOVE PROTEXIS W/NEU-THERA 8.0  2D73TE80

## (undated) RX ORDER — LIDOCAINE HYDROCHLORIDE AND EPINEPHRINE 15; 5 MG/ML; UG/ML
INJECTION, SOLUTION EPIDURAL
Status: DISPENSED
Start: 2018-07-23

## (undated) RX ORDER — GABAPENTIN 300 MG/1
CAPSULE ORAL
Status: DISPENSED
Start: 2018-07-23

## (undated) RX ORDER — KETOROLAC TROMETHAMINE 30 MG/ML
INJECTION, SOLUTION INTRAMUSCULAR; INTRAVENOUS
Status: DISPENSED
Start: 2018-07-23

## (undated) RX ORDER — ONDANSETRON 2 MG/ML
INJECTION INTRAMUSCULAR; INTRAVENOUS
Status: DISPENSED
Start: 2018-07-23

## (undated) RX ORDER — DEXAMETHASONE SODIUM PHOSPHATE 10 MG/ML
INJECTION, SOLUTION INTRAMUSCULAR; INTRAVENOUS
Status: DISPENSED
Start: 2018-07-23

## (undated) RX ORDER — ROPIVACAINE HYDROCHLORIDE 7.5 MG/ML
INJECTION, SOLUTION EPIDURAL; PERINEURAL
Status: DISPENSED
Start: 2018-07-23

## (undated) RX ORDER — FENTANYL CITRATE 50 UG/ML
INJECTION, SOLUTION INTRAMUSCULAR; INTRAVENOUS
Status: DISPENSED
Start: 2018-07-23

## (undated) RX ORDER — SCOLOPAMINE TRANSDERMAL SYSTEM 1 MG/1
PATCH, EXTENDED RELEASE TRANSDERMAL
Status: DISPENSED
Start: 2018-07-23

## (undated) RX ORDER — PROPOFOL 10 MG/ML
INJECTION, EMULSION INTRAVENOUS
Status: DISPENSED
Start: 2018-07-23